# Patient Record
Sex: MALE | Race: OTHER | Employment: OTHER | URBAN - METROPOLITAN AREA
[De-identification: names, ages, dates, MRNs, and addresses within clinical notes are randomized per-mention and may not be internally consistent; named-entity substitution may affect disease eponyms.]

---

## 2023-01-12 ENCOUNTER — HOSPITAL ENCOUNTER (INPATIENT)
Age: 73
LOS: 9 days | Discharge: HOME HEALTH | DRG: 057 | End: 2023-01-21
Attending: PHYSICAL MEDICINE & REHABILITATION | Admitting: PHYSICAL MEDICINE & REHABILITATION
Payer: COMMERCIAL

## 2023-01-12 PROBLEM — I63.531 ACUTE ISCHEMIC RIGHT PCA STROKE (HCC): Status: ACTIVE | Noted: 2023-01-12

## 2023-01-12 LAB
GLUCOSE BLD-MCNC: 200 MG/DL (ref 70–99)
PERFORMED ON: ABNORMAL

## 2023-01-12 PROCEDURE — 1280000000 HC REHAB R&B

## 2023-01-12 PROCEDURE — 94150 VITAL CAPACITY TEST: CPT

## 2023-01-12 PROCEDURE — 6360000002 HC RX W HCPCS: Performed by: PHYSICAL MEDICINE & REHABILITATION

## 2023-01-12 PROCEDURE — 6370000000 HC RX 637 (ALT 250 FOR IP): Performed by: PHYSICAL MEDICINE & REHABILITATION

## 2023-01-12 RX ORDER — LANOLIN ALCOHOL/MO/W.PET/CERES
3 CREAM (GRAM) TOPICAL NIGHTLY PRN
Status: DISCONTINUED | OUTPATIENT
Start: 2023-01-12 | End: 2023-01-21 | Stop reason: HOSPADM

## 2023-01-12 RX ORDER — HEPARIN SODIUM 5000 [USP'U]/ML
5000 INJECTION, SOLUTION INTRAVENOUS; SUBCUTANEOUS EVERY 12 HOURS
Status: DISCONTINUED | OUTPATIENT
Start: 2023-01-12 | End: 2023-01-21 | Stop reason: HOSPADM

## 2023-01-12 RX ORDER — INSULIN LISPRO 100 [IU]/ML
0-4 INJECTION, SOLUTION INTRAVENOUS; SUBCUTANEOUS
Status: DISCONTINUED | OUTPATIENT
Start: 2023-01-12 | End: 2023-01-21 | Stop reason: HOSPADM

## 2023-01-12 RX ORDER — ASPIRIN 81 MG/1
81 TABLET ORAL DAILY
Status: DISCONTINUED | OUTPATIENT
Start: 2023-01-13 | End: 2023-01-21 | Stop reason: HOSPADM

## 2023-01-12 RX ORDER — QUETIAPINE FUMARATE 25 MG/1
50 TABLET, FILM COATED ORAL NIGHTLY PRN
Status: DISCONTINUED | OUTPATIENT
Start: 2023-01-12 | End: 2023-01-21 | Stop reason: HOSPADM

## 2023-01-12 RX ORDER — SENNA AND DOCUSATE SODIUM 50; 8.6 MG/1; MG/1
1 TABLET, FILM COATED ORAL 2 TIMES DAILY
Status: DISCONTINUED | OUTPATIENT
Start: 2023-01-12 | End: 2023-01-21 | Stop reason: HOSPADM

## 2023-01-12 RX ORDER — HYDRALAZINE HYDROCHLORIDE 10 MG/1
10 TABLET, FILM COATED ORAL EVERY 6 HOURS PRN
Status: DISCONTINUED | OUTPATIENT
Start: 2023-01-12 | End: 2023-01-21 | Stop reason: HOSPADM

## 2023-01-12 RX ORDER — DEXTROSE MONOHYDRATE 100 MG/ML
INJECTION, SOLUTION INTRAVENOUS CONTINUOUS PRN
Status: DISCONTINUED | OUTPATIENT
Start: 2023-01-12 | End: 2023-01-21 | Stop reason: HOSPADM

## 2023-01-12 RX ORDER — ERGOCALCIFEROL 1.25 MG/1
50000 CAPSULE ORAL WEEKLY
Status: DISCONTINUED | OUTPATIENT
Start: 2023-01-13 | End: 2023-01-21 | Stop reason: HOSPADM

## 2023-01-12 RX ORDER — ATORVASTATIN CALCIUM 80 MG/1
80 TABLET, FILM COATED ORAL NIGHTLY
Status: DISCONTINUED | OUTPATIENT
Start: 2023-01-12 | End: 2023-01-21 | Stop reason: HOSPADM

## 2023-01-12 RX ORDER — INSULIN LISPRO 100 [IU]/ML
0-4 INJECTION, SOLUTION INTRAVENOUS; SUBCUTANEOUS NIGHTLY
Status: DISCONTINUED | OUTPATIENT
Start: 2023-01-12 | End: 2023-01-21 | Stop reason: HOSPADM

## 2023-01-12 RX ORDER — ACETAMINOPHEN 325 MG/1
650 TABLET ORAL EVERY 6 HOURS PRN
Status: DISCONTINUED | OUTPATIENT
Start: 2023-01-12 | End: 2023-01-21 | Stop reason: HOSPADM

## 2023-01-12 RX ORDER — POLYETHYLENE GLYCOL 3350 17 G/17G
17 POWDER, FOR SOLUTION ORAL DAILY PRN
Status: DISCONTINUED | OUTPATIENT
Start: 2023-01-12 | End: 2023-01-21 | Stop reason: HOSPADM

## 2023-01-12 RX ORDER — ONDANSETRON 4 MG/1
4 TABLET, FILM COATED ORAL EVERY 8 HOURS PRN
Status: DISCONTINUED | OUTPATIENT
Start: 2023-01-12 | End: 2023-01-21 | Stop reason: HOSPADM

## 2023-01-12 RX ORDER — BISACODYL 10 MG
10 SUPPOSITORY, RECTAL RECTAL DAILY PRN
Status: DISCONTINUED | OUTPATIENT
Start: 2023-01-12 | End: 2023-01-21 | Stop reason: HOSPADM

## 2023-01-12 RX ADMIN — ACETAMINOPHEN 325MG 650 MG: 325 TABLET ORAL at 22:26

## 2023-01-12 RX ADMIN — ATORVASTATIN CALCIUM 80 MG: 80 TABLET, FILM COATED ORAL at 20:26

## 2023-01-12 RX ADMIN — SENNOSIDES AND DOCUSATE SODIUM 1 TABLET: 50; 8.6 TABLET ORAL at 20:26

## 2023-01-12 RX ADMIN — HEPARIN SODIUM 5000 UNITS: 5000 INJECTION INTRAVENOUS; SUBCUTANEOUS at 22:28

## 2023-01-12 RX ADMIN — QUETIAPINE FUMARATE 50 MG: 25 TABLET ORAL at 22:31

## 2023-01-12 ASSESSMENT — PAIN SCALES - WONG BAKER: WONGBAKER_NUMERICALRESPONSE: 0

## 2023-01-12 ASSESSMENT — PAIN - FUNCTIONAL ASSESSMENT: PAIN_FUNCTIONAL_ASSESSMENT: ACTIVITIES ARE NOT PREVENTED

## 2023-01-12 ASSESSMENT — PAIN DESCRIPTION - LOCATION: LOCATION: HEAD

## 2023-01-12 ASSESSMENT — PAIN SCALES - GENERAL: PAINLEVEL_OUTOF10: 7

## 2023-01-12 NOTE — H&P
Department of Physical Medicine & Rehabilitation  History & Physical      Patient Identification:  Gabriela Buenrostro  : 1950  Admit date: 2023   Attending provider: Jossy Coffey MD        Primary care provider: No primary care provider on file. Chief Complaint: left vision impairment, memory difficulty    History of Present Illness/Hospital Course:  Patient is a 80 yo M with pmh DM2 who initially presented to the [facility] on 2023 with headache, blurred vision, and difficulty ambulating. CTA head/neck revealed right PCA P2 segment occlusion. MRI brain showed acute ischemic infarcts in the right PCA distribution (right temporal and occipital lobes) with hemorrhagic transformation. There is concern for cardioembolic etiology. HUGO with PFO. Neurology recommending ASA, statin, and cardiac event monitor. Course complicated by hyperglycemia, SHELLIE, and incidental finding of meningioma. Now presents to ARU with impaired mobility, self-care, and cognition below his baseline. Currently, patient reports abnormal left peripheral vision. Also noting difficulty with memory. He has had intermittent headaches localized to right posterior head with radiation forward. Overall headaches gradually improving. He denies focal weakness, tingling/numbness, difficulty with speech or swallow. He is motivated to start inpatient rehab program.     Prior Level of Function:  Independent for mobility, ADLs, and IADLs  Working full time as  at school    Current Level of Function:  Min-max assist for mobility and ADLs    Pertinent Social History:  Support: lives in group home with 4 roommates  Home set-up: multi level with 6 steps to enter    Past Medical History:   Diagnosis Date    A-fib ([facility] [address]     CHF (congestive heart failure) ([facility] [address]     DM2 (diabetes mellitus, type 2) ([facility] [address]     Meningioma ([facility] [address]        No past surgical history on file. No family history on file.     Social History     Socioeconomic History Marital status: Single     Spouse name: None    Number of children: None    Years of education: None    Highest education level: None   Tobacco Use    Smoking status: Former     Types: Cigarettes     Passive exposure: Past    Smokeless tobacco: Never   Vaping Use    Vaping Use: Never used   Substance and Sexual Activity    Alcohol use: Not Currently     Comment: \"I used to. But not anymore. \"    Drug use: Not Currently     Comment: \"I used to. \"    Sexual activity: Not Currently       No Known Allergies      Current Facility-Administered Medications   Medication Dose Route Frequency Provider Last Rate Last Admin    aspirin EC tablet 81 mg  81 mg Oral Daily Messi Alegria MD   81 mg at 01/13/23 0739    atorvastatin (LIPITOR) tablet 80 mg  80 mg Oral Nightly Messi Alegria MD   80 mg at 01/12/23 2026    heparin (porcine) injection 5,000 Units  5,000 Units SubCUTAneous Q12H Messi Alegria MD   5,000 Units at 01/12/23 2228    vitamin D (ERGOCALCIFEROL) capsule 50,000 Units  50,000 Units Oral Weekly Messi Alegria MD   50,000 Units at 01/13/23 0739    QUEtiapine (SEROQUEL) tablet 50 mg  50 mg Oral Nightly PRN Messi Alegria MD   50 mg at 01/12/23 2231    melatonin tablet 3 mg  3 mg Oral Nightly PRN Messi Alegria MD        [facility]ansetron [facility]) tablet 4 mg  4 mg Oral Q8H PRN Messi Alegria MD        insulin lispro (HUMALOG) injection vial 0-4 Units  0-4 Units SubCUTAneous TID [facility] Messi Alegria MD   1 Units at 01/13/23 0740    insulin lispro (HUMALOG) injection vial 0-4 Units  0-4 Units SubCUTAneous Nightly Messi Alegria MD        metFORMIN (GLUCOPHAGE) tablet 1,000 mg  1,000 mg Oral Daily with breakfast Messi Alegria MD   1,000 mg at 01/13/23 0740    hydrALAZINE (APRESOLINE) tablet 10 mg  10 mg Oral Q6H PRN Messi Alegria MD        bisacodyl (DULCOLAX) suppository 10 mg  10 mg Rectal Daily PRN Messi Alegria MD        acetaminophen (TYLENOL) tablet 650 mg  650 mg Oral Q6H PRN Preeti Parrish MD   650 mg at 01/13/23 0739    polyethylene glycol (GLYCOLAX) packet 17 g  17 g Oral Daily PRN Preeti Parrish MD   17 g at 01/13/23 6754    magnesium hydroxide (MILK OF MAGNESIA) 400 MG/5ML suspension 30 mL  30 mL Oral Daily PRN Preeti Parrish MD        sennosides-docusate sodium (SENOKOT-S) 8.6-50 MG tablet 1 tablet  1 tablet Oral BID Preeti Parrish MD   1 tablet at 01/13/23 0741    glucose chewable tablet 16 g  4 tablet Oral PRN Preeti Parrish MD        dextrose bolus 10% 125 mL  125 mL IntraVENous PRN Preeti Parrish MD        Or    dextrose bolus 10% 250 mL  250 mL IntraVENous PRN Preeti Parrish MD        glucagon (rDNA) injection 1 mg  1 mg SubCUTAneous PRPAULO Parrish MD        dextrose 10 % infusion   IntraVENous Continuous JENNIFER Parrish MD             REVIEW OF SYSTEMS:   CONSTITUTIONAL: negative for fevers, chills, diaphoresis, appetite change, night sweats, unexpected weight change, +fatigue. EYES: negative for blurred vision, eye discharge, icterus. +visual disturbance  HEENT: negative for hearing loss, tinnitus, ear drainage, sinus pressure, nasal congestion, epistaxis and snoring. RESPIRATORY: Negative for hemoptysis, cough, sputum production. CARDIOVASCULAR: negative for chest pain, palpitations, exertional chest pressure/discomfort, syncope, edema   GASTROINTESTINAL: negative for nausea, vomiting, diarrhea, blood in stool, abdominal pain, constipation. GENITOURINARY: negative for frequency, dysuria, urinary incontinence, decreased urine volume, and hematuria. HEMATOLOGIC/LYMPHATIC: negative for easy bruising, bleeding and lymphadenopathy. ALLERGIC/IMMUNOLOGIC: negative for recurrent infections, angioedema, anaphylaxis and drug reactions. ENDOCRINE: negative for weight changes and diabetic symptoms including polyuria, polydipsia and polyphagia.    MUSCULOSKELETAL: negative for pain, joint swelling, decreased range of motion. NEUROLOGICAL: refer to HPI  PSYCHIATRIC/BEHAVIORAL: negative for hallucinations, behavioral problems, agitation, confusion. All pertinent positives are noted in the HPI. Physical Examination:  Vitals: Patient Vitals for the past 24 hrs:   BP Temp Temp src Pulse Resp SpO2 Height Weight   01/13/23 0841 -- -- -- -- -- 97 % -- --   01/13/23 0735 120/71 98 °F (36.7 °C) Oral 55 18 99 % -- --   01/13/23 0539 101/61 98.2 °F (36.8 °C) Oral (!) 47 16 96 % -- 167 lb 15.9 oz (76.2 kg)   01/12/23 1845 122/76 97.9 °F (36.6 °C) Oral 57 16 95 % 6' 8\" (2.032 m) 167 lb 15.9 oz (76.2 kg)       Const: Alert. WDWN. No distress  Eyes: Conjunctiva noninjected, no icterus noted; pupils equal, round, and reactive to light. HENT: Atraumatic, normocephalic; Oral mucosa moist  Neck: Trachea midline, neck supple. No thyromegaly noted. CV: Regular rate and rhythm, no murmur rub or gallop noted  Resp: Lungs clear to auscultation bilaterally, no rales wheezes or rhonchi, no retractions. Respirations unlabored. GI: Soft, nontender, nondistended. Normal bowel sounds. No palpable masses. Skin: Normal temperature and turgor. No rashes or breakdown noted on exposed areas. Ext: No significant edema appreciated. No varicosities. MSK: No joint tenderness, erythema, warmth noted. AROM intact. Neuro:   -Mental status: Alert. Oriented to person, place, time, situation. Impaired recall, mildly delayed processing.   -Language: Speech fluent, repetition and naming intact  -Cranial nerves: VFF, PERRL, EOMI, Facial sensation intact, Face symmetric, Hearing intact, Palate elevation symmetric, Shoulder shrug intact. Tongue midline.   -Sensation intact to light touch. -Motor examination reveals strength 5/5 RUE/RLE, subtle weakness 5-/5 LUE/LLE compared to right.   -No abnormalities with finger/nose noted. -Reflexes diminished, symmetric.  Negative Shaunna  Psych: Stable mood, normal judgement, normal affect     Lab Results Component Value Date    WBC 7.3 01/13/2023    HGB 12.8 (L) 01/13/2023    HCT 39.8 (L) 01/13/2023    MCV 88.7 01/13/2023     01/13/2023     No results found for: INR, PROTIME  Lab Results   Component Value Date    CREATININE 1.3 01/13/2023    BUN 14 01/13/2023     01/13/2023    K 4.2 01/13/2023     01/13/2023    CO2 23 01/13/2023     No results found for: ALT, AST, GGT, ALKPHOS, BILITOT    Available laboratory, medical testing, and imaging data from [facility] has been reviewed. Sodium 01/12/2023  140  Potassium 01/12/2023  3.9  Chloride 01/12/2023  103  BUN 01/12/2023  14  Creatinine 01/12/2023  1.44  WBC 01/12/2023  7.06  Hemoglobin 01/12/2023  12.3  Hematocrit 01/12/2023  37.5  Platelet Count 83/21/0550  256    HUGO 1/9/23 Study Conclusions  - Left ventricle: The cavity size is normal. Systolic function was  normal. The calculated ejection fraction was in the range of 52%  to 58%. - Mitral valve: There is no evidence of a vegetation.  - Left atrium: There is no evidence of a thrombus in the atrial  cavity or appendage.  - Right atrium: There is no evidence of a thrombus in the atrial  cavity or appendage.  - Atrial septum: There was a patent foramen ovale. Agitated saline  contrast study shows a moderate bidirectional, but predominantly  left-to-right, atrial level shunt. CTA Neck   1. Arterial stenosis     CTA Head:   1. Occluded right posterior cerebral artery involving the distal P2 segment   2. Acute infarction involving the right occipitotemporal lobe involving right PCA territory    MRI brain   1. Large acute infarction in the right PCA territory involving right occipital lobe, inferior medial right temporal lobe and left thalamus with evidence of hemorrhagic transformation. 2. Probable 1.4 x 0.6 cm right frontal convexity meningioma. Body mass index is 18.45 kg/m².     POST ADMISSION PHYSICIAN EVALUATION  The patient has agreed to being admitted to our comprehensive inpatient rehabilitation facility and can tolerate the intensity of service consisting of at least:  --180 minutes of therapy a day, 5 out of 7 days a week. OR  --15 hours of intensive therapy within a 7 consecutive day period. The patient/family has a good understanding of our discharge process and will benefit from an interdisciplinary inpatient rehabilitation program. The patient has potential to make improvement and is in need of at least two of the following multidisciplinary therapies including but not limited to physical, occupational, respiratory, and speech, nutritional services, wound care, and prosthetics and orthotics. Given the patients complex condition and risk of further medical complications, rehabilitation services cannot be safely provided at a lower level of care such as a skilled nursing facility. All of the goals listed below were reviewed with the patient and he/she is in agreement. I have compared the patients medical and functional status at the time of the preadmission screening and the same on this date, and there are no significant changes. By signing this document, I acknowledge that I have personally performed a full physical examination on this patient within 24 hours of admission to this inpatient rehabilitation facility and have determined the patient to be able to tolerate the above course of treatment at an intensive level for a reasonable period of time. I will be completing a detailed individualized  Plan of Care for this patient by day four of the patients stay based upon the Preadmission Screen, this Post-Admission Evaluation, and the therapy evaluations.      Barriers: left hemiparesis, left VF deficit, decreased balance, cognition, medical comorbidities  Services Required: PT, OT, SLP  Goals: mod for mobility and ADLs  Prognosis: Good  Anticipated Dispo: home to group home  ELOS: 7-10 days    Rehabilitation Diagnosis:   Stroke, 1.1, Left Body (R Brain)      Assessment and Plan:    Impairments:left hemiparesis, left VF deficit, decreased balance, cognition    Acute ischemic right PCA stroke with hemorrhagic transformation  -Localization: P2 occlusion affecting R temporal and occipital lobes, thalamus  -Etiology: concern for embolic source  -Secondary ppx: ASA, statin  -Telemetry while inpatient, then cardiac event monitor  -PT/OT/SLP    DM2 with hyperglycemia  -Hgb A1C 10  -metformin, ISS    dCHF  -Daily wt    Sinus bradycardia  -Baseline per patient  -Telemetry    SHELLIE vs CKD  -Cr 1.3-1.5 at [facility]  -Avoid nephrotoxins, renally dose meds  -Monitor renal function    Right frontal convexity Meningioma  -Incidental finding  -Plan for f/u with Dr. Jose Epps as outpatient    Bladder   -High risk retention   -Monitor PVRs, SC prn >300cc    Bowel   -High risk constipation   -senna+colace BID, PRN miralax, MoM, and bisacodyl supp. Safety   -fall precautions    Pain control  -acetaminophen prn    DVT ppx  -heparin    FULL CODE      Tracy Gil MD 1/13/2023, 10:26 AM

## 2023-01-13 PROBLEM — E44.0 MODERATE MALNUTRITION (HCC): Status: ACTIVE | Noted: 2023-01-13

## 2023-01-13 LAB
ANION GAP SERPL CALCULATED.3IONS-SCNC: 13 MMOL/L (ref 3–16)
BASOPHILS ABSOLUTE: 0 K/UL (ref 0–0.2)
BASOPHILS RELATIVE PERCENT: 0.4 %
BUN BLDV-MCNC: 14 MG/DL (ref 7–20)
CALCIUM SERPL-MCNC: 9.1 MG/DL (ref 8.3–10.6)
CHLORIDE BLD-SCNC: 100 MMOL/L (ref 99–110)
CO2: 23 MMOL/L (ref 21–32)
CREAT SERPL-MCNC: 1.3 MG/DL (ref 0.8–1.3)
EOSINOPHILS ABSOLUTE: 0.2 K/UL (ref 0–0.6)
EOSINOPHILS RELATIVE PERCENT: 2.5 %
GFR SERPL CREATININE-BSD FRML MDRD: 59 ML/MIN/{1.73_M2}
GLUCOSE BLD-MCNC: 182 MG/DL (ref 70–99)
GLUCOSE BLD-MCNC: 183 MG/DL (ref 70–99)
GLUCOSE BLD-MCNC: 222 MG/DL (ref 70–99)
GLUCOSE BLD-MCNC: 238 MG/DL (ref 70–99)
GLUCOSE BLD-MCNC: 263 MG/DL (ref 70–99)
HCT VFR BLD CALC: 39.8 % (ref 40.5–52.5)
HEMOGLOBIN: 12.8 G/DL (ref 13.5–17.5)
LYMPHOCYTES ABSOLUTE: 2.1 K/UL (ref 1–5.1)
LYMPHOCYTES RELATIVE PERCENT: 28.5 %
MCH RBC QN AUTO: 28.6 PG (ref 26–34)
MCHC RBC AUTO-ENTMCNC: 32.3 G/DL (ref 31–36)
MCV RBC AUTO: 88.7 FL (ref 80–100)
MONOCYTES ABSOLUTE: 0.6 K/UL (ref 0–1.3)
MONOCYTES RELATIVE PERCENT: 7.5 %
NEUTROPHILS ABSOLUTE: 4.5 K/UL (ref 1.7–7.7)
NEUTROPHILS RELATIVE PERCENT: 61.1 %
PDW BLD-RTO: 14 % (ref 12.4–15.4)
PERFORMED ON: ABNORMAL
PLATELET # BLD: 265 K/UL (ref 135–450)
PMV BLD AUTO: 7.4 FL (ref 5–10.5)
POTASSIUM REFLEX MAGNESIUM: 4.2 MMOL/L (ref 3.5–5.1)
PREALBUMIN: 25.1 MG/DL (ref 20–40)
RBC # BLD: 4.48 M/UL (ref 4.2–5.9)
SODIUM BLD-SCNC: 136 MMOL/L (ref 136–145)
WBC # BLD: 7.3 K/UL (ref 4–11)

## 2023-01-13 PROCEDURE — 1280000000 HC REHAB R&B

## 2023-01-13 PROCEDURE — 36415 COLL VENOUS BLD VENIPUNCTURE: CPT

## 2023-01-13 PROCEDURE — 97112 NEUROMUSCULAR REEDUCATION: CPT | Performed by: PHYSICAL THERAPIST

## 2023-01-13 PROCEDURE — 6370000000 HC RX 637 (ALT 250 FOR IP): Performed by: PHYSICAL MEDICINE & REHABILITATION

## 2023-01-13 PROCEDURE — 97116 GAIT TRAINING THERAPY: CPT | Performed by: PHYSICAL THERAPIST

## 2023-01-13 PROCEDURE — 6360000002 HC RX W HCPCS: Performed by: PHYSICAL MEDICINE & REHABILITATION

## 2023-01-13 PROCEDURE — 92523 SPEECH SOUND LANG COMPREHEN: CPT

## 2023-01-13 PROCEDURE — 97530 THERAPEUTIC ACTIVITIES: CPT

## 2023-01-13 PROCEDURE — 94760 N-INVAS EAR/PLS OXIMETRY 1: CPT

## 2023-01-13 PROCEDURE — 84134 ASSAY OF PREALBUMIN: CPT

## 2023-01-13 PROCEDURE — 97162 PT EVAL MOD COMPLEX 30 MIN: CPT | Performed by: PHYSICAL THERAPIST

## 2023-01-13 PROCEDURE — 97530 THERAPEUTIC ACTIVITIES: CPT | Performed by: PHYSICAL THERAPIST

## 2023-01-13 PROCEDURE — 97535 SELF CARE MNGMENT TRAINING: CPT

## 2023-01-13 PROCEDURE — 97166 OT EVAL MOD COMPLEX 45 MIN: CPT

## 2023-01-13 PROCEDURE — 80048 BASIC METABOLIC PNL TOTAL CA: CPT

## 2023-01-13 PROCEDURE — 85025 COMPLETE CBC W/AUTO DIFF WBC: CPT

## 2023-01-13 RX ORDER — ASPIRIN 81 MG/1
81 TABLET ORAL DAILY
Status: ON HOLD | COMMUNITY
Start: 2023-01-11 | End: 2023-01-20 | Stop reason: SDUPTHER

## 2023-01-13 RX ORDER — ATORVASTATIN CALCIUM 80 MG/1
80 TABLET, FILM COATED ORAL NIGHTLY
Status: ON HOLD | COMMUNITY
Start: 2023-01-10 | End: 2023-01-20 | Stop reason: SDUPTHER

## 2023-01-13 RX ORDER — TERBINAFINE HYDROCHLORIDE 250 MG/1
250 TABLET ORAL 2 TIMES DAILY
COMMUNITY

## 2023-01-13 RX ORDER — QUETIAPINE FUMARATE 50 MG/1
50 TABLET, FILM COATED ORAL PRN
Status: ON HOLD | COMMUNITY
Start: 2022-12-13 | End: 2023-01-20 | Stop reason: SDUPTHER

## 2023-01-13 RX ORDER — ERGOCALCIFEROL 1.25 MG/1
50000 CAPSULE ORAL WEEKLY
COMMUNITY

## 2023-01-13 RX ADMIN — ATORVASTATIN CALCIUM 80 MG: 80 TABLET, FILM COATED ORAL at 19:56

## 2023-01-13 RX ADMIN — ASPIRIN 81 MG: 81 TABLET, COATED ORAL at 07:39

## 2023-01-13 RX ADMIN — HEPARIN SODIUM 5000 UNITS: 5000 INJECTION INTRAVENOUS; SUBCUTANEOUS at 22:30

## 2023-01-13 RX ADMIN — POLYETHYLENE GLYCOL 3350 17 G: 17 POWDER, FOR SOLUTION ORAL at 06:08

## 2023-01-13 RX ADMIN — ACETAMINOPHEN 325MG 650 MG: 325 TABLET ORAL at 07:39

## 2023-01-13 RX ADMIN — ERGOCALCIFEROL 50000 UNITS: 1.25 CAPSULE ORAL at 07:39

## 2023-01-13 RX ADMIN — METFORMIN HYDROCHLORIDE 1000 MG: 500 TABLET ORAL at 07:40

## 2023-01-13 RX ADMIN — INSULIN LISPRO 1 UNITS: 100 INJECTION, SOLUTION INTRAVENOUS; SUBCUTANEOUS at 07:40

## 2023-01-13 RX ADMIN — QUETIAPINE FUMARATE 50 MG: 25 TABLET ORAL at 22:28

## 2023-01-13 RX ADMIN — HEPARIN SODIUM 5000 UNITS: 5000 INJECTION INTRAVENOUS; SUBCUTANEOUS at 11:30

## 2023-01-13 RX ADMIN — SENNOSIDES AND DOCUSATE SODIUM 1 TABLET: 50; 8.6 TABLET ORAL at 07:41

## 2023-01-13 ASSESSMENT — 9 HOLE PEG TEST
TEST_RESULT: IMPAIRED
TESTTIME_SECONDS: 30
TEST_RESULT: IMPAIRED
TESTTIME_SECONDS: 37

## 2023-01-13 ASSESSMENT — PAIN - FUNCTIONAL ASSESSMENT: PAIN_FUNCTIONAL_ASSESSMENT: ACTIVITIES ARE NOT PREVENTED

## 2023-01-13 ASSESSMENT — PAIN DESCRIPTION - ORIENTATION: ORIENTATION: LEFT;RIGHT

## 2023-01-13 ASSESSMENT — PAIN SCALES - GENERAL: PAINLEVEL_OUTOF10: 3

## 2023-01-13 ASSESSMENT — PAIN DESCRIPTION - LOCATION: LOCATION: HEAD

## 2023-01-13 ASSESSMENT — PAIN DESCRIPTION - DESCRIPTORS: DESCRIPTORS: ACHING

## 2023-01-13 NOTE — PLAN OF CARE
Problem: Discharge Planning  Goal: Discharge to home or other facility with appropriate resources  1/13/2023 0926 by Jazmine Harris RN  Outcome: Progressing    Problem: Safety - Adult  Goal: Free from fall injury  1/13/2023 0926 by Jazmine Harris RN  Outcome: Progressing    Problem: Pain  Goal: Verbalizes/displays adequate comfort level or baseline comfort level  1/13/2023 0926 by Jazmine Harris RN  Outcome: Progressing    Problem: Respiratory - Adult  Goal: Achieves optimal ventilation and oxygenation  Outcome: Progressing     Problem: Cardiovascular - Adult  Goal: Maintains optimal cardiac output and hemodynamic stability  Recent Flowsheet Documentation    Problem: Skin/Tissue Integrity - Adult  Goal: Skin integrity remains intact  Outcome: Progressing

## 2023-01-13 NOTE — PROGRESS NOTES
Physical Therapy  Facility/Department: [facility]HAB  Rehabilitation Physical Therapy Initial Assessment    NAME: Chaitanya Costa  : 1950 (81 y.o.)  MRN: [id number]  CODE STATUS: Full Code    Date of Service: 23      Past Medical History:   Diagnosis Date    A-fib [facility])     CHF (congestive heart failure) ([facility]     DM2 (diabetes mellitus, type 2) ([facility]     Meningioma ([facility]      History reviewed. No pertinent surgical history. Chart Reviewed: Yes  Patient assessed for rehabilitation services?: Yes  Additional Pertinent Hx: Patient is a 80 yo M with pmh DM2 who initially presented to the [facility] on 2023 with headache, blurred vision, and difficulty ambulating. CTA head/neck revealed right PCA P2 segment occlusion. MRI brain showed acute ischemic infarcts in the right PCA distribution (right temporal and occipital lobes) with hemorrhagic transformation, incidental finding of meningioma. Family / Caregiver Present: No  Referring Practitioner: Yonas Lee MD  Referral Date : 23  Diagnosis: R PCA stroke    Restrictions:  Restrictions/Precautions: Fall Risk  Position Activity Restriction  Other position/activity restrictions: reg diet     SUBJECTIVE  Subjective: Pt reports some dizziness during session.     Prior Level of Function:  Social/Functional History  Lives With: Friend(s) (shares apt w/ 3 other men)  Type of Home: [facility], bedrm/bathrm on 2nd fl)  Home Access: Stairs to enter with rails  Entrance Stairs - Number of Steps: 4 steps to enter building, has 10 steps to 2nd fl where bathrm is located, another 10 steps to bedrm on 3rd fl  Entrance Stairs - Rails: Both  Bathroom Shower/Tub: Walk-in shower  Bathroom Toilet: Standard  Bathroom Accessibility: Not accessible  Has the patient had two or more falls in the past year or any fall with injury in the past year?: No  ADL Assistance: [address] [place] [address] Avenue: [address] [place] [address] Responsibilities: Yes  Meal Prep Responsibility: Primary  Laundry Responsibility: Primary  Cleaning Responsibility: Primary  Shopping Responsibility: Primary  Ambulation Assistance: Independent  Transfer Assistance: Independent  Active : No  Mode of Transportation: Friends, Bus  Occupation: Full time employment  Type of Occupation:  at [employer], his boss will pick him up otherwise used [address]: watches movies  Additional Comments: sleeps in regular bed, has 1 brother & 2 sisters in town but 2 of the siblings have dementia    OBJECTIVE  Vision  Vision: Impaired  Vision Exceptions: Wears glasses for reading (glasses not here)  Tracking: Able to track stimulus in all quads w/o difficulty  Convergence: Breaks at 7 from nose    Hearing  Hearing: Within functional limits    Cognition  Overall Cognitive Status: Exceptions  Memory: Decreased short term memory  Insights: Decreased awareness of deficits  Cognition Comment: pt is cooperative, mild word finding & mild slow processing    ROM   WFL BLEs    Strength  Strength RLE  Strength RLE: WFL  Strength LLE  Strength LLE: WFL    Quality of Movement  Tone RLE  RLE Tone: Normotonic  Tone LLE  LLE Tone: Normotonic  Motor Control  Gross Motor?: WNL  Coordination  Rapid Alternating Movements: Normal  Finger to Nose: Normal  Heel to Shin: Normal    Sensation  Overall Sensation Status: WNL    Functional Mobility  Bed mobility  Bridging: Supervision  Rolling to Left: Supervision  Rolling to Right: Supervision  Supine to Sit: Supervision  Sit to Supine: Supervision  Scooting: Supervision  Bed Mobility Comments: Flat bed  Transfers  Sit to Stand: Contact guard assistance  Stand to Sit: Contact guard assistance  Bed to Chair: Contact guard assistance  Stand Pivot Transfers: Contact guard assistance  Car Transfer: Contact guard assistance  Comment: Cues for hand placement and to turn all the way around to sit.     Environmental Mobility  Ambulation  Surface: Level tile; Carpet  Device: Sheridan Patricio; No Device  Assistance: Contact guard assistance  Quality of Gait: R LE turned out L straight  Gait Deviations: Decreased head and trunk rotation;Staggers  Distance: 50', 150', 200'. 200' x2 w/o AD  Stairs/Curb  Stairs?: Yes  Stairs  # Steps : 4  Stairs Height: 6\"  Rails: Bilateral  Curbs: 6\"  Device: No Device  Assistance: Contact guard assistance  Comment: Reciprocal up, 1 at a time initially then reciprocally    PM session:  Balance  Posture: Good  Sitting - Static: Good  Sitting - Dynamic: Good  Standing - Static: Good;-  Standing - Dynamic: Fair;+  Comments: Tinetti Balance Score = 23/28 Interpretation: Scores between 19 and 23 are consistent with moderate fall risk. Standing balance on AirEx foam with feet hips width apart then in sharp Rhomberg with eyes open then closed. He had more difficulty with eyes closed. PT Exercises  Standing Open/Closed Kinetic Chain Exercises: Mini Squats, marches, heel/toe raises, ABD/ADD, hip ext and knee flexion x15 reps with cues for form  Ambulation  Surface: Level tile; Carpet  Device: No Device  Assistance: Contact guard assistance  Quality of Gait: R LE turned out L straight  Gait Deviations: Decreased head and trunk rotation;Staggers  Distance: 200' x2 w/o AD  ASSESSMENT     Activity Tolerance  Activity Tolerance: Patient tolerated evaluation without incident    Assessment  Assessment: Patient is a 82 yo M with pmh DM2 who initially presented to the [facility] on 1/5/2023 with headache, blurred vision, and difficulty ambulating. CTA head/neck revealed right PCA P2 segment occlusion. MRI brain showed acute ischemic infarcts in the right PCA distribution (right temporal and occipital lobes) with hemorrhagic transformation, incidental finding of meningioma. The pt lives at [address] [place] [address],[address] Floor with 3 roommates in 2nd floor Apt with 6 MONICA with rails.  He works full time as a  at [employer] and he doesn't drive so his boss picks him up. He otherwise uses the [employer] for transportation. He has good strength and ROM but his balance is impaired, he scored 23/28 on the Tinetti gait and balance score. He needs CGA for safety with transfers, bed mobility, gait and stairs. He did report some dizziness during sessions today. Pt would benefit from skilled PT on our ARU to maximize functional mobility, gait and independence so he can return home and go back to work. Treatment Diagnosis: Decreased functional mobility  Therapy Prognosis: Good  Decision Making: Medium Complexity  History: Patient is a 82 yo M with pmh DM2 who initially presented to the [facility] on 1/5/2023 with headache, blurred vision, and difficulty ambulating. CTA head/neck revealed right PCA P2 segment occlusion. MRI brain showed acute ischemic infarcts in the right PCA distribution (right temporal and occipital lobes) with hemorrhagic transformation, incidental finding of meningioma. The pt lives at [address] [place] [address],[address] with 3 roommates in 2nd floor Apt with 6 MONICA with rails. He works full time as a  at [employer] and he doesn't drive so his boss picks him up. He otherwise uses the [employer] for transportation. Exam: He has good strength and ROM but his balance is impaired, he scored 23/28 on the Tinetti gait and balance score. He needs CGA for safety with transfers, bed mobility, gait and stairs. He did report some dizziness during sessions today. Pt would benefit from skilled PT on our ARU to maximize functional mobility, gait and independence so he can return home and go back to work.   Clinical Presentation: Evolving  Barriers to Learning: L neglect, visual field cut beyond 30*  Treatment Initiated : 1/13/23  Discharge Recommendations: Continue to assess pending progress  PT D/C Equipment  Other: Will continue to assess  PT Equipment Recommendations  Other: Will continue to assess    CLINICAL IMPRESSION   Patient is a 81 yo M with pmh DM2 who initially presented to the [facility] on 1/5/2023 with headache, blurred vision, and difficulty ambulating. CTA head/neck revealed right PCA P2 segment occlusion. MRI brain showed acute ischemic infarcts in the right PCA distribution (right temporal and occipital lobes) with hemorrhagic transformation, incidental finding of meningioma. The pt lives at [address] [place] [address],[address] Floor with 3 roommates in 2nd floor Apt with 6 MONICA with rails. He works full time as a  at [employer] and he doesn't drive so his boss picks him up. He otherwise uses the [employer] for transportation. He has good strength and ROM but his balance is impaired, he scored 23/28 on the Tinetti gait and balance score. He needs CGA for safety with transfers, bed mobility, gait and stairs. He did report some dizziness during sessions today. Pt would benefit from skilled PT on our ARU to maximize functional mobility, gait and independence so he can return home and go back to work. GOALS  Patient Goals   Patient Goals : I want to go back to work  Short Term Goals  Time Frame for Short Term Goals: 1 week  Short Term Goal 1: I bed mobility  Short Term Goal 2: Mod I for all functional transfers  Short Term Goal 3: Amb 200' with Mod I and LRAD  Short Term Goal 4: Up/down curb with mod I  Short Term Goal 5: Up/down 12 steps with mod I    PLAN OF CARE  Frequency: 1-2 treatment sessions per day, 5-7 days per week  Physcial Therapy Plan  General Plan:  minutes of therapy at least 5 out of 7 days a week  Days Per Week: 5 Days  Hours Per Day: 1.5 hours  Therapy Duration: 4-7 Days  Current Treatment Recommendations: Strengthening;Balance training;Functional mobility training;Transfer training; Endurance training;Gait training;Stair training;Neuromuscular re-education; Return to work related activity;Home exercise program;Safety education & training;Patient/Caregiver education & training;Equipment evaluation, education, & procurement; Therapeutic activities  Safety Devices  Type of Devices: All fall risk precautions in place;Gait belt;Patient at risk for falls; Left in chair (Pt back to room with transport)  Restraints  Restraints Initially in Place: No    EDUCATION  Education  Education Given To: Patient  Education Provided: Role of Therapy;Plan of Care;Transfer Training;Mobility Training;Equipment; Safety  Education Method: Verbal;Demonstration  Barriers to Learning: None  Education Outcome: Verbalized understanding    ELOS: 5-7 days         Therapy Time   Individual Concurrent Group Co-treatment   Time In 0945         Time Out 1703         Minutes 45           Timed Code Treatment Minutes: 39 An Leonides Waggoner 54 Time     Individual Co-treatment   Time In 7773     Time Out 4101 Edilberto Caldera, PT, 01/13/23 at 5:10 PM

## 2023-01-13 NOTE — FLOWSHEET NOTE
Patient admitted to room  3273 last night 1/12/23 with Dx of R CVA   Arrived via stretcher with transporters. Patient was oriented to the Call Light, Phone, TV, Thermostat, Bed Controls, Bathroom and Emergency Cord. Patient verbalized and demonstrated understanding of all. Patient was also given an over view of Unit Routines for Acute Rehab, including what to wear for therapy. The patient's role in goal setting was reviewed along with an explanation of the Interdisciplinary Team meeting, the 's role in coordinating services and the Discharge Planning/Continuum of Care process. Patient Rights and Responsibilities were reviewed. Meal times were explained, including how to order food. The white board (used for communication) was pointed out emphasizing  the 3 hours/day Therapy Schedule (posted most evenings), the number (and process) for reporting grievances, and the Doctor's, Nurse's, and PCA's names. It was recommended that any family that will be care givers or any care givers the patient has, take part in therapy. There are no set visiting hours, and it was suggested that non-caregiver friends and family visitors come after therapy (at 4 PM or later) to allow patient to rest in between sessions.

## 2023-01-13 NOTE — PROGRESS NOTES
Occupational Therapy  Facility/Department: [facility]  Rehabilitation Occupational Therapy Evaluation       Date: 23  Patient Name: Douglas Parmar       Room: Y3C-9149/4391-21  MRN: [id number]  Account: [de-identified]   : 1950  (81 y.o.) Gender: male     Referring Practitioner: Dr Bill Finn  Diagnosis: CVA  Additional Pertinent Hx: pt is a 80 y/o male admitted from [facility] due to acute embolic stroke. Now presents w/ dense L homonymous hemianopia and weakness in L hemibody w/ mild ataxia L UE. MRI: large infarct in R PCA territory involving R occipital lobe, inferior medial R temporal lobe & L thalamus w/ evidence of hemorrhagic transformation. PMhx: DM, HTN, smoker, EF 53%    Restrictions  Restrictions/Precautions: Fall Risk  Other position/activity restrictions: reg diet  Equipment Used: Bed    Subjective             Vitals  Temp: 98 °F (36.7 °C)  Heart Rate: 55  Resp: 18  BP: 120/71  Weight: 167 lb 15.9 oz (76.2 kg)  BMI (Calculated): 18.5  Oxygen Therapy  SpO2: 97 %  Pulse Oximetry Type: Intermittent  Pulse Oximeter Device Mode: Intermittent  Pulse Oximeter Device Location: Finger  O2 Device: None (Room air)  Level of Consciousness: Alert (0)    Objective--completed during PM session  Vision - Basic Assessment  Prior Vision: Wears glasses only for reading (glasses are not here)  Visual Field Cut: Left  Oculo Motor Control:  WNL  Vision Comments: eye chart: L eye: 20/30, R eye: 20/50, vision disk: R--85*, L--30* (visual field loss L side)  Cognition  Overall Cognitive Status: Exceptions  Memory: Decreased short term memory  Insights: Decreased awareness of deficits  Cognition Comment: pt is cooperative, mild word finding & mild slow processing  Orientation  Overall Orientation Status: Within Normal Limits  Orientation Level: Oriented X4   Perception  Overall Perceptual Status: WFL (finger to nose test WNLs, will cont to address L visual field)  Sensation  Overall Sensation Status: WNL   ROM  LUE AROM (degrees)  LUE AROM : WNL  Left Hand AROM (degrees)  Left Hand AROM: WNL  RUE AROM (degrees)  RUE AROM : WNL  Right Hand AROM (degrees)  Right Hand AROM: WNL  LUE Strength  L Hand General: 4+/5  RUE Strength  R Hand General: 5/5  Fine Motor Skills  Coordination  Movements Are Fluid And Coordinated: No  Coordination and Movement Description: Fine motor impairments;Decreased speed;Decreased accuracy; Left UE   Comment: mild ataxia L UE, he is R hand dominant   Hand Assessment--completed during PM session  Hand Dominance  Hand Dominance: Right  LUE Edema - Circumference (cm)  LUE Edema Present?: No  Right Hand Strength -  (lbs)  Handle Setting 2: 101,100,101--average: 101lb average (45 lbs above average for age group)  RUE Edema - Circumference (cm)  RUE Edema Present?: No  Functional Mobility  Balance  Sitting Balance: Independent  Standing Balance: Contact guard assistance  Standing Balance  Time: 3-4 minutes  Activity: during t/f, shower  Comment: used GB for support, mild posterior L side sway but no LOB; overall close SB/CGA  Transfers  Sit to stand: Contact guard assistance  Stand to sit: Contact guard assistance  Transfer Comments: close SBA/CGA; cues not to pull up on RW but to push up from bed  Toilet Transfers  Equipment Used: Grab bars  Toilet Transfer: Contact guard assistance  Toilet Transfers Comments: close SB/CBA using GB, mild posterior lean & to L  Shower Transfers  Shower - Transfer From: Walker  Shower - Transfer Type: To  Shower - Transfer To:  Standing  Shower - Technique: Ambulating  Shower Transfers: Contact Guard  Shower Transfers Comments: close SB/CGA to use GBs to enter/exit shower stall, did not use shower chair; cues to keep 1 hand on GB AATs during shower  Wheelchair Bed Transfers  Equipment Used: Bed  Level of Asssistance: Contact guard assistance  Wheelchair Transfers Comments: SB/CGA for safe hand placement & use of RW (unsteady w/o AD due to posterior lean & to L)  Social/Functional History  Lives With: Friend(s) (shares apt w/ 3 other men)  Type of Home: Apartment [employer], bedrm/bathrm on 2nd fl)  Home Access: Stairs to enter with rails  Entrance Stairs - Number of Steps: 4 steps to enter building, has 10 steps to 2nd fl where bathrm is located, another 10 steps to bedrm on 3rd fl  Entrance Stairs - Rails: Both  Bathroom Shower/Tub: Walk-in shower  Bathroom Toilet: Standard  Bathroom Accessibility: Not accessible  Has the patient had two or more falls in the past year or any fall with injury in the past year?: No  ADL Assistance: [address]: Independent  Homemaking Responsibilities: Yes  Meal Prep Responsibility: Primary  Laundry Responsibility: Primary  Cleaning Responsibility: Primary  Shopping Responsibility: Primary  Ambulation Assistance: Independent  Transfer Assistance: Independent  Active : No  Mode of Transportation: Friends;Bus  Occupation: Full time employment  Type of Occupation:  at [employer], his boss will pick him up otherwise used [address]: watches movies  Additional Comments: sleeps in regular bed, has 1 brother & 2 sisters in town but 2 of the siblings have dementia  ADL  Feeding: Setup  Grooming: Stand by assistance  Grooming Skilled Clinical Factors: stood at sink to shave  UE Bathing: Stand by assistance;Setup;Verbal cueing  UE Bathing Skilled Clinical Factors: stood during shower, cues to keep 1 hand on GBs AATs  LE Bathing: Contact guard assistance  LE Bathing Skilled Clinical Factors: while in shower, he leans against wall, bumped head on L side as he tried to bend over to dry off feet; OT recommended he sit to dry off feet to reduce fall risk  UE Dressing: Setup  LE Dressing: Contact guard assistance  LE Dressing Skilled Clinical Factors: close SB/CGA during LB dressing, bumps against wall on L side; recommended he sit to place feet into underwear & pants, tries to lean against wall to tie drawstring on pants  Toileting: Contact guard assistance;Stand by assistance  Toileting Skilled Clinical Factors: close SB/CGA due to mild posterior leaning, cues to use Gbs  Additional Comments: shower chair available however he chose to stand for duration of shower, safety concerns when trying to bend over to dry off shins & feet; highly recommended he sit to reduce fall risk. Pt is very tall 6'8''    Goals  Patient Goals   Patient goals : \"get back to normal & go back to work\"  Short Term Goals  Time Frame for Short Term Goals: by 5 days pt will complete  Short Term Goal 1: Grooming IND'ly while standing at sink  Short Term Goal 2: UB & LB dressing IND'ly  Short Term Goal 3: toileting IND'ly  Short Term Goal 4: household transfers IND'ly  Short Term Goal 5: standing balance x 20 minutes during IADLs, pt is --will need to cook, clean & do laundry  Long Term Goals  Time Frame for Long Term Goals : by 5 days pt will complete  Long Term Goal 1: increase L  strength x 2 lbs to be IND w/ opening containers, lids  Long Term Goal 2: address vision further using vision disk, eye chart, scaning and tracking w/ 100% accuracy    Assessment  Performance deficits / Impairments: Decreased functional mobility ; Decreased safe awareness;Decreased balance;Decreased coordination;Decreased ADL status; Decreased vision/visual deficit; Decreased high-level IADLs;Decreased strength;Decreased fine motor control  Assessment: pt is a 82 y/o male who arrived from [facility] after a stroke; he presented w/ HA, visual disturbance and L mingo body weakness & ataxia. PTA< he was completely IND w/ all aspects of ADLs, IADLs, was living at [employer] working F/T as . Pt takes bus or has boss pick him up. Currently he is needing up to CGA to use RW for household distances & during ADLs due to mild L side weakness, posterior sway and ataxia.  He is functioning below previous baseline and would benefit from OT services on rehab x 5 days to increase IND w/ balance, safety ADLs/IADLs and return back to work activities  Treatment Diagnosis: impaired ADLs  Prognosis: Good  Decision Making: Medium Complexity  Treatment Initiated : 1/13/23 Fri  Discharge Recommendations: Continue to assess pending progress  Occupational Therapy Plan  Times Per Week: 5-6  Specific Instructions for Next Treatment: will be a short 5 day stay  Current Treatment Recommendations: Strengthening;Balance training;Functional mobility training;Gait training;Equipment evaluation, education, & procurement;Self-Care / ADL; Patient/Caregiver education & training;Return to work related activity;Home management training       Therapy Time   Individual Concurrent Group PM-treatment   Time In 0800      1305   Time Out 0915      1345   Minutes 75      40   Timed Code Treatment Minutes: [address], [place] #[id number]

## 2023-01-13 NOTE — FLOWSHEET NOTE
Ethnicity  \"Are you of , /a, or Faroese origin? \"  Check all that apply:  [x] A. No, not of , /a, or [place] Origin  [] B.  Yes, [place], [place] American, Chicano/a  [] C.  Yes, [address] [place] [address]  [] D.  Yes, [place]  [] E.  Yes, another , , or Faroese origin  [] X. Patient unable to respond    Race  \"What is your race? \"  Check all that apply:  [] A. White  [x] B. Black or   [] C. American [facility] or [place] Native  [] D.  [facility]  [] E. [place]  [] F. Tanzanian  [] G. [place]  [] Aldo Starling  [] I. [place]  [] J.  Other   [] K.   [] L. Mongolian or [place]ani  [] M. Macanese  [] N. Other [place]  [] X. Patient unable to respond    High Risk Drug Classes:  Use and Indication    Is taking: Check if the pt is taking any medications by pharmacological classification, not how it is used, in the following classes  Indication noted: If column 1 is checked, check if there is an indication noted for all meds in the drug class Is taking  (check all that apply) Indication noted (check all that apply)   Antipsychotic [] []   Anticoagulant [x] []   Antibiotic [] []   Opioid [] []   Antiplatelet [x] []   Hypoglycemic (including insulin) [] []   None of the above []     Special Treatments, Procedures, and Programs    Check all of the following treatments, procedures, and programs that apply on admission. On admission (check all that apply)   Cancer Treatments   A1. Chemotherapy []           A2. IV []           A3. Oral []           A10. Other []   B1. Radiation []   Respiratory Therapies   C1. Oxygen Therapy []           C2. Continuous []           C3. Intermittent []           C4. High-concentration []   D1. Suctioning []           D2. Scheduled []           D3. As needed []   E1. Tracheostomy Care []   F1.  Invasive Mechanical Ventilator (ventilator or respirator) []   G1. Non-invasive Mechanical Ventilator []           G2. BiPAP [] G3. CPAP []   Other   H1. IV Medications []           H2. Vasoactive medications []           H3. Antibiotics []           H4. Anticoagulation [x]           H10. Other []   I1. Transfusions []   J1. Dialysis []           J2. Hemodialysis []           J3. Peritoneal dialysis []   O1. IV access []           O2. Peripheral []           O3. Midline []           O4.  Central (PICC, tunneled, port) []      None of the above (select if no Cancer, Respiratory, or Other boxes are checked) []

## 2023-01-13 NOTE — PROGRESS NOTES
Patient was admitted to [address] [place] [address] on 1/12/2023 with CVA. Patient is alert and oriented x4. Respirations easy,Lungs clear ,on room air. Denies having chest pain/discomfort,denies having SOB. On Regular,carb control diet. Medications taken whole with thin liquids,tolerating well. Receives Heparin for DVT prophylaxis . Patient has been Continent of bowel and bladder. Skin: abrasions. Transfers using Gait belt and walker  x 1assist. Tolerates well. Safety precautions remains in place,call light in reach,bed alarm activated,will continue to monitor.

## 2023-01-13 NOTE — FLOWSHEET NOTE
Patient has voided twice since he was admitted to this unit. He walks to the bathroom x 1 assist with a walker. He stated he's been going to the  to urinate at [facility] before d/c. Refused bladder scan as he said; \"I'm not retaining urine. I've gone three times here. \"

## 2023-01-13 NOTE — PLAN OF CARE
Problem: Discharge Planning  Goal: Discharge to home or other facility with appropriate resources  1/12/2023 2221 by Meri Goodell, RN  Outcome: Progressing  1/12/2023 1945 by Meri Goodell, RN  Outcome: Progressing     Problem: Safety - Adult  Goal: Free from fall injury  Outcome: Progressing     Problem: Pain  Goal: Verbalizes/displays adequate comfort level or baseline comfort level  Outcome: Progressing

## 2023-01-13 NOTE — FLOWSHEET NOTE
Patient was admitted to ARU for PT/OT on 1/12/23; d/c'd from [facility]  He is alert and oriented. Stated he lives in [place] [address] and takes elevator to his apt on second floor. He said he has roommates and just lives there temporarily coz he left his old apt when landlord raised the rent cost.    He originally came into [facility] ED with headache, blurred vision and gait instability. 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 He was diagnosed with acute ischemic stroke (R CVA) with the usual deficit of left side weakness    He had a Neuro consult for his meningioma. To F/u with neuro outpatient. He denies he needs glasses unless he needs to read. Stated he is hungry. He is diabetic. And takes metformin bid. Blood sugar check result is 200. He is on regular diet. Given 2 turkey sandwiches, 3 cups of juice, pudding and Jello. He is a min. Asst. Says he walks with a walker since he was in [facility] has a telemonitor on showing sinus lamont at 50. He had an ECG  and VASCI-venous dupler LE complete bilateral at [place].  He has a cardiology appt on 2.7.23. He takes asa and heparin for DVT prophylaxis. Denies pain at this time. He is on prn melatonin for insomnia    He said his cousin Rob Paul will visit him and bring him fresh clothes. Oriented to room including call lights. Reminded to call if he needs to use the BR to have a BM. LBM 1/10/23.

## 2023-01-13 NOTE — CARE COORDINATION
Chart Reviewed. Met with patient to introduce  role, initiate discussion regarding DC planning, Complete ACP and to inform of weekly Team Conferences to review his progress. Referral made to spiritual care team for creation of adv directives. SOCIAL WORK ASSESSMENT      GOAL:   to return home and to work. HOME SITUATION:  Pt lives within [facility] with three other gentlemen in an apartment with 4 steps entry and 10 steps up to a bathroom and 10 more steps up to his bedroom. He works full time as a  in a school. He is totally independent with all personal care needs. He does not drive; has used the bus or boss transports him to/from work. Pcp:   [facility] for MD.  They pick him up for the appt. PHarmacy:  [facility] [address] [address]        PRIOR LEVEL OF FUNCTIONING:       PERSONAL CARE:    totally independent                                                                       DRIVES: no                                                                     FINANCES:                                                                   MEALS:   independent                             GROCERY SHOPS:      DME CURRENTLY AT HOME: none      CURRENT HOME CARE/SERVICES:none  Informed him of possible post acute services such as home care or out patient to continue his progress. PREFERRED HOME CARE:  TBD      ADVANCED DIRECTIVES:  Referral sent to spiritual care team for creation      TEAM CONFERENCE DAY:  Tuesdays. Informed him of weekly Team Conferences where Team will review progress, barriers, dme needs and dc date. This worker will update him weekly for DC planning needs. LSW informed patient of preferred  time on date of discharge which is between 10 - 12 noon. LSW informed patient of recommendation for PCP visit within 7 days post discharge.     TRANSPORTAITON:     he denies having missed any appts or needs of daily living due to lack of transportation.   [place], [place]     Case Management   [phone]    1/13/2023  1:15 PM

## 2023-01-13 NOTE — PROGRESS NOTES
Speech Language Pathology  Facility/Department: [facility]  Initial Speech/Language/Cognitive Assessment    NAME: Chava Robbins  : 1950   MRN: [id number]  ADMISSION DATE: 2023  ADMITTING DIAGNOSIS: has Acute ischemic right PCA stroke (HCC) and Moderate malnutrition ([facility] on their problem list.   has a past medical history of A-fib ([facility] CHF (congestive heart failure) ([facility] DM2 (diabetes mellitus, type 2) ([facility] and [facility] ([facility] Resides at [address]: 2023    Date of Eval: 2023   Evaluating Therapist: BRIAN Naik    RECENT RESULTS  MRI: 2023  IMPRESSION:   1. Large acute infarction in the right PCA territory involving right occipital lobe, inferior medial right temporal lobe and left thalamus with evidence of hemorrhagic transformation. 2. Probable 1.4 x 0.6 cm right frontal convexity meningioma. Signed By: Juliano Mora MD, [address] [place] [address]  Primary Complaint:   MD order due to recent stroke        SLP Assessment Impressions:  1. Cognitive Diagnosis: Pt demonstrated functional temporal orientation; attention; working memory and STM ;and concrete VPS/PS and reasoning on testing. Pt with deficits on testing performance for complex reasoning . Further assessment of executive function unless otherwise notified  2. Speech Diagnosis: Audible and intelligible connected speech. 3. Communication Diagnosis: [place] language skills appear intact. Pt with breakdown with complex processing (ie extra time or assist). This may be baseline. Pt was functional in verbal expression of needs/wants; CFN; concept explanations/event explanations.         Recommendations:  Recommendations  Requires SLP Intervention: Yes (f/u ongoing testing)  Patient Education Response: Verbalizes understanding;Needs reinforcement  Duration of Treatment: 1-2 times unless otherwise notified      Plan:   Speech Therapy Prognosis  Prognosis: Good  Individuals consulted  Consulted and agree with results and recommendations: Patient  Safety Devices  Safety Devices in place: Yes    Goals:  Pt goal is to get back home  Goal 1: Pt will bart ongoing assessment of cognitive-linguistic skills for executive funciton unless otherwise notified   Patient/family involved in developing goals and treatment plan: yes    Subjective:   Previous level of function and limitations: History per chart review and pt self report  Social/Functional History  Lives With: Friend(s) (shares apt w/ 3 other men)  Type of Home: Apartment [employer], bedrm/bathrm on 2nd fl)  Home Access: Stairs to enter with rails  Entrance Stairs - Number of Steps: 4 steps to enter building, has 10 steps to 2nd fl where bathrm is located, another 10 steps to bedrm on 3rd fl  Entrance Stairs - Rails: Both  Bathroom Shower/Tub: Walk-in shower  Bathroom Toilet: Standard  Bathroom Accessibility: Not accessible  Has the patient had two or more falls in the past year or any fall with injury in the past year?: No  ADL Assistance: [address]: Independent  Homemaking Responsibilities: Yes  Meal Prep Responsibility: Primary  Laundry Responsibility: Primary  Cleaning Responsibility: Primary  Shopping Responsibility: Primary  Ambulation Assistance: Independent  Transfer Assistance: Independent  Active : No  Mode of Transportation: Friends;Bus  Education: HS; some college for Recreation Education  Occupation: Full time employment  Type of Occupation:  at [employer], his boss will pick him up otherwise used [address]: watches movies  Additional Comments: sleeps in regular bed, has 1 brother & 2 sisters in town but 2 of the siblings have dementia             Objective:  Assessment included Oral Motor Speech/Voice Mechanism Exam; Cognitive -Linguistic Exam   Pain: denied  Vision  Vision: Impaired  Vision Exceptions: Wears glasses for reading (glasses not here)  Hearing  Hearing: Within functional limits    Oral Motor   Labial:  (fairly symmetrical volitional rom for rounding/retraction/protrusion)  Lingual:  (very slight deviation on protrusion/elevation)  Velum:  (symmetrical during phonation of /a/)    Motor Speech  Intelligibility: No impairment (audible and intelligible connected speech)    Auditory Comprehension  Comprehension: Within Functional Limits  Basic Questions: WFL  Complex Questions: WFL  One Step Commands: WFL  Two Step Commands: WFL  Multistep Commands: WFL  Complex/Abstract Commands: WFL (2 step sequential and spatial commands)  L/R Discrimination: WFL  Conversation: WFL    Reading Comprehension  Reading Status: Exceptions to [facility]  Words Impairment Severity: WFL  Phrases Impairment Severity: WFL  Sentence Impairment Severity: WFL  Paragraph Impairment Severity: Minimal (extra time and intermittent assist with mild complex 4-7 line paragraph information. However if topic is not familiar increase assist. Unclear baseline)  Effective Techniques: Large print    Expression  Primary Mode of Expression: Verbal  Verbal Expression  Verbal Expression: Within functional limits (WFL for CFN; responsive naming; concrete and mild complex concept explanation)    Written Expression  Dominant Hand: Right  Written Expression:  (DNT)    Pragmatics/Social Functioning  Pragmatics: Within functional limits (WFL during testing.  However overall reduced eye contact)    Cognition:   Orientation  Overall Orientation Status: Within Functional Limits (temporal orientation WFL on testing)  Attention  Attention: Exceptions to [facility]  Alternating Attention: WFL (WFL on testing; needed extra time)  Divided Attention:  (TBA)  Selective Attention: [facility]  Sustained Attention: [facility]  Memory  Memory: Within Functional Limits  Short-term Memory: WFL (grossly WFL on testing.)  Working Memory: WFL (grossly WFL on testing)  Problem Solving  Problem Solving: Exceptions to [facility]  Simple Functional Tasks: [facility]  Verbal Reasoning Skills: Mild (breakdown with abstraction)  Executive Function Skills:  (TBA;unless otherwise notified)  Numeric Reasoning  Numeric Reasoning: Within Functional Limits (WFL on testing for concrete calc and time measurements)  Abstract Reasoning  Abstract Reasoning: Exceptions to [facility]  Convergent Thinking: Mild (breakdown with complex/abstraction)  Divergent Thinking: Mild (breakdown with complex/abstraction)  Safety/Judgment  Safety/Judgment: Within Functional Limits (WFL on testing; ongoing assess with executive function unless otherwise notified)      Prognosis:  Speech Therapy Prognosis  Prognosis: Good  Individuals consulted  Consulted and agree with results and recommendations: Patient    Education:  Patient Education Response: Verbalizes understanding;Needs reinforcement  Safety Devices in place: Yes       Therapy Time:   Individual   Time In 1515   Time Out 1550   Minutes 35           Electronically signed by   Med Gao. FarhanMS,CCC,SLP [id number]  Speech and Language Pathologist    on 1/13/2023 at 4:01 PM

## 2023-01-13 NOTE — PLAN OF CARE
ARU PATIENT TREATMENT PLAN  [address] Giovani Rogel   [phone]    Rosalie Munoz    : 1950  Acct #: [de-identified]  MRN: [id number]   PHYSICIAN:  Tyrone Leal MD  Primary Problem    Patient Active Problem List   Diagnosis    Acute ischemic right PCA stroke ([address]    Moderate malnutrition [facility])       Rehabilitation Diagnosis:     Acute ischemic right PCA stroke [facility]) [I63.531]       ADMIT DATE:2023    Patient Goals: \"get back to normal & go back to work\"    Admitting Impairments: left hemiparesis, left VF deficit, decreased balance, cognition     Acute ischemic right PCA stroke with hemorrhagic transformation  -Localization: P2 occlusion affecting R temporal and occipital lobes, thalamus  -Etiology: concern for embolic source  -Secondary ppx: ASA, statin  -Telemetry while inpatient, then cardiac event monitor  -PT/OT/SLP     DM2 with hyperglycemia  -Hgb A1C 10  -metformin, ISS     dCHF  -Daily wt     SHELLIE vs CKD  -Cr 1.3-1.5 at [facility]  -Avoid nephrotoxins, renally dose meds  -Monitor renal function     Right frontal convexity Meningioma  -Incidental finding  -Plan for f/u with Dr. Herbert [facility] as outpatient     Bladder   -High risk retention   -Monitor PVRs, SC prn >300cc     Bowel   -High risk constipation   -senna+colace BID, PRN miralax, MoM, and bisacodyl supp.      Barriers: left hemiparesis, left VF deficit, decreased balance, cognition, medical comorbidities  Participation: good     CARE PLAN     NURSING:  Rosalie Munoz while on this unit will:     [] Be continent of bowel and bladder     [x] Have an adequate number of bowel movements  [x] Urinate with no urinary retention >300ml in bladder  [] Complete bladder protocol with gramajo removal  [x] Maintain O2 SATs at 92%  [x] Have pain managed while on ARU       [] Be pain free by discharge   [x] Have no skin breakdown while on ARU  [] Have improved skin integrity via wound measurements  [] Have no signs/symptoms of infection at the wound site  [x] Be free from injury during hospitalization   [x] Complete education with patient/family with understanding demonstrated for:Stroke  [x] Adjustment   [x] Other:   Nursing interventions may include bowel/bladder training, education for medical assistive devices, medication education, O2 saturation management, energy conservation, stress management techniques, fall prevention, alarms protocol, seating and positioning, skin/wound care, pressure relief instruction,dressing changes,  infection protection, DVT prophylaxis, and/or assistance with in room safety with transfers to bed, toilet, wheelchair, shower as well as bathroom activities and hygiene. Patient/caregiver education for:   [x] Disease/sustained injury/management      [x] Medication Use   [] Surgical intervention   [x] Safety   [x] Body mechanics and or joint protection   [x] Health maintenance         PHYSICAL THERAPY:  Goals:                  Short Term Goals  Time Frame for Short Term Goals: 1 week  Short Term Goal 1: I bed mobility  Short Term Goal 2: Mod I for all functional transfers  Short Term Goal 3: Amb 200' with Mod I and LRAD  Short Term Goal 4: Up/down curb with mod I  Short Term Goal 5: Up/down 12 steps with mod I               These goals were reviewed with this patient at the time of assessment and Otoniel Albrecht is in agreement. Plan of Care: Pt to be seen 90   mins per day for 5-6 day/week 5 days.                    Current Treatment Recommendations: Strengthening, Balance training, Functional mobility training, Transfer training, Endurance training, Gait training, Stair training, Neuromuscular re-education, Return to work related activity, Home exercise program, Safety education & training, Patient/Caregiver education & training, Equipment evaluation, education, & procurement, Therapeutic activities      OCCUPATIONAL THERAPY:  Goals:             Short Term Goals  Time Frame for Short Term Goals: by 5 days pt will complete  Short Term Goal 1: Grooming IND'ly while standing at sink  Short Term Goal 2: UB & LB dressing IND'ly  Short Term Goal 3: toileting IND'ly  Short Term Goal 4: household transfers IND'ly  Short Term Goal 5: standing balance x 20 minutes during IADLs, pt is --will need to cook, clean & do laundry :  Long Term Goals  Time Frame for Long Term Goals : by 5 days pt will complete  Long Term Goal 1: increase L  strength x 2 lbs to be IND w/ opening containers, lids  Long Term Goal 2: address vision further using vision disk, eye chart, scaning and tracking w/ 100% accuracy : These goals were reviewed with this patient at the time of assessment and Leslie Eisenberg is in agreement    Plan of Care:  Pt to be seen 90   mins per day for 5-6 day/week x 5 days           SPEECH THERAPY: Goals will be left blank if speech is not following this patient. Goals:                                                         Short Term Goals   Goals: Pt goal is to get back home  Goal 1: [facility] assessment of cognitive-linguistic skills for executive funciton unless otherwise notified              Time Frame for Short Term Goals: Pt goal is to get back home  These goals were reviewed with this patient at the time of assessment and Leslie Eisenberg is in agreement    Plan of Care: Pt to be seen 30   mins per day for 1-2 times unless otherwise notified. CASE MANAGEMENT:  Goals:   Assist patient/family with discharge planning, patient/family counseling,   and coordination with insurance during ARU stay.       Admission Period/Goal QIM CODES   QIM  Admit/Goal Score    Eating     Oral Hygiene     Toileting Hygiene     Shower/Bathe Self     Upper Body Dressing     Lower Body Dressing     Putting on/Taking off Footwear     Roll Left and Right     Sit to Lying     Lying to Sitting on Side of Bed     Sit to Stand     Chair/Bed to Chair Transfer     Toilet Transfer     Car Transfer     Walk 10 feet     Walk 50 feet with 2 Turns     Walk 150 feet with 2 turns     Walk 10 feet on Uneven Surfaces     1 Step     4 Steps     12 Steps     Picking up Object     Wheel 50 feet with 2 Turns   Type? Wheel 150 feet with 2 Turns   Type? Queenie Mariscal will be seen a minimum of 3 hours of therapy per day, a minimum of 5 out of 7 days per week. [] In this rare instance due to the nature of this patient's medical involvement, this patient will be seen 15 hours per week (900 minutes within a 7 day period). Treatments may include therapeutic exercises, gait training, neuromuscular re-ed, transfer training, community reintegration, bed mobility, w/c mobility and training, self care, home mgmt, cognitive training, energy conservation,dysphagia tx, speech/language/communication therapy, group therapy, and patient/family education. In addition, dietician/nutritionist may monitor calorie count as well as intake and collaboratively work with SLP on dietary upgrades. Neuropsychology/Psychology may evaluate and provide necessary support.     Medical issues being managed closely and that require 24 hour availability of a physician:   [x] Swallowing Precautions  [x] Bowel/Bladder Fx  [] Weight bearing precautions   [] Wound Care    [x] Pain Mgmt   [x] Infection Protection   [x] DVT Prophylaxis   [x] Fall Precautions  [x] Fluid/Electrolyte/Nutrition Balance   [] Voice Protection   [] Respiratory  [] Other:    Medical Prognosis: [x] Good  [] Fair    [] Guarded   Total expected IRF days 7-10 days  Anticipated discharge destination:    [] Home Independently   [x] Home Modified Independent  [] Home with supervision    []SNF     [] Other                                           Physician anticipated functional outcomes:  mod for mobility and ADLs   IPOC brief synthesis: Patient is a 82 yo M with pmh DM2 who initially [facility] the [facility] with headache, blurred vision, and difficulty ambulating. CTA head/neck revealed right PCA P2 segment occlusion. MRI brain showed acute ischemic infarcts in the right PCA distribution (right temporal and occipital lobes) with hemorrhagic transformation. There is concern for cardioembolic etiology. HUGO with PFO. Neurology recommending ASA, statin, and cardiac event monitor. Course complicated by hyperglycemia, SHELLIE, and incidental finding of meningioma. Now presents to ARU with impaired mobility, self-care, and cognition below his baseline. He requires comprehensive inpatient rehab program in order to return to community setting. I have reviewed this initial plan of care and agree with its contents:    Title   Name    Date    Time    Physician: Abran Mcintyre. Tsering Hernández MD 1/13/2023, 5:09 PM      Case Mgmt:  Patricio Hwang, [place]     Case Management   [phone]    1/13/2023  4:17 PM      OT: Minus Form, OTR/L #[id number]     PT:  Brianne Jackson, PT #[id number]    ST:  Estelle Lala. Flum,MS,CCC,SLP [id number] Speech and Language Pathologist signed 1/13/2023 at 1618 pm      ARU Lyndsay Beltran [address]    Other:

## 2023-01-13 NOTE — FLOWSHEET NOTE
4 Eyes Skin Assessment     NAME:  Jeremy Burnette  YOB: 1950  MEDICAL RECORD NUMBER:  [id number]    The patient is being assessed for  Admission    I agree that One RN have performed a thorough Head to Toe Skin Assessment on the patient. ALL assessment sites listed below have been assessed. Areas assessed by both nurses:    Head, Face, Ears, Shoulders, Back, Chest, Arms, Elbows, Hands, Sacrum. Buttock, Coccyx, Ischium, and Legs. Feet and Heels        Does the Patient have a Wound?  No noted wound(s)       Reginaldo Prevention initiated by RN: NA   Wound Care Orders initiated by RN: NA    Pressure Injury (Stage 3,4, Unstageable, DTI, NWPT, and Complex wounds) if present place referral order by RN under : NA    New and Established Ostomies, if present place, referral order under : NA      Nurse 1 eSignature: Electronically signed by Don Ferreira RN on 1/12/23 at 11:25 PM EST    **SHARE this note so that the co-signing nurse is able to place an eSignature**    Nurse 2 eSignature: Electronically signed by Jose Angel Saldaña RN on 1/13/23 at 3:01 AM EST

## 2023-01-13 NOTE — ACP (ADVANCE CARE PLANNING)
Advance Care Planning     Advance Care Planning Activator (Inpatient)  Conversation Note      Date of ACP Conversation: 1/13/2023     Conversation Conducted with: Patient with Decision Making Capacity    ACP Activator: [id number] Decision Maker:     Dyana Vee  cousin   [phone]  Darrel Otto, Sister  [phone]    Current Designated Health Care Decision Maker:     Click here to complete Healthcare Decision Makers including section of the Healthcare Decision Maker Relationship (ie \"Primary\")      Care Preferences    Ventilation: \"If you were in your present state of health and suddenly became very ill and were unable to breathe on your own, what would your preference be about the use of a ventilator (breathing machine) if it were available to you? \"      Would the patient desire the use of ventilator (breathing machine)?: yes    \"If your health worsens and it becomes clear that your chance of recovery is unlikely, what would your preference be about the use of a ventilator (breathing machine) if it were available to you? \"     Would the patient desire the use of ventilator (breathing machine)?: No      Resuscitation  \"CPR works best to restart the heart when there is a sudden event, like a heart attack, in someone who is otherwise healthy. Unfortunately, CPR does not typically restart the heart for people who have serious health conditions or who are very sick. \"    \"In the event your heart stopped as a result of an underlying serious health condition, would you want attempts to be made to restart your heart (answer \"yes\" for attempt to resuscitate) or would you prefer a natural death (answer \"no\" for do not attempt to resuscitate)? \" yes       [] Yes   [] No   Educated Patient / Rodriguez Winston regarding differences between Advance Directives and portable DNR orders.     Length of ACP Conversation in minutes:  2 minutes    Conversation Outcomes:  [x] ACP discussion completed  [] Existing advance directive reviewed with patient; no changes to patient's previously recorded wishes  [] New Advance Directive completed  [] Portable Do Not Rescitate prepared for Provider review and signature  [] POLST/POST/MOLST/MOST prepared for Provider review and signature      Follow-up plan:    [x] Schedule follow-up conversation to continue planning  [] Referred individual to Provider for additional questions/concerns   [] Advised patient/agent/surrogate to review completed ACP document and update if needed with changes in condition, patient preferences or care setting    [] This note routed to one or more involved healthcare providers    Referral made to spiritual Care Team for creation of adv directives.     [place], [place]     Case Management   [phone]    1/13/2023  1:08 PM

## 2023-01-13 NOTE — CONSULTS
Comprehensive Nutrition Assessment    Type and Reason for Visit:  Initial, Consult    Nutrition Recommendations/Plan:   Add carbohydrate and sodium modification to diet given hx CHF and DM  Add Glucerna daily to start     Malnutrition Assessment:  Malnutrition Status: Moderate malnutrition (01/13/23 1307)    Context:  Acute Illness     Findings of the 6 clinical characteristics of malnutrition:  Energy Intake:  No significant decrease in energy intake  Weight Loss:  No significant weight loss     Body Fat Loss:  Mild body fat loss Orbital, Buccal region   Muscle Mass Loss:  Mild muscle mass loss Temples (temporalis)  Fluid Accumulation:  No significant fluid accumulation     Strength:  Not Performed    Nutrition Assessment:    Consult per ARU protocol. PMH includes CHF, DM, Meningioma. Pt adm for debility r/t right pca stroke. Diet adv to regular. Pt reported and records confirm good po intake at %. With current BS running in the 200s and hx of HF, pt agreed to adding mild sodium and carb modifications. Pt does have some mild muscle and fat wasting. Will offer Glucerna daily to start. Nutrition Related Findings:    Labs reviewed. Noted BM on 1/10. Noted no edema. Wound Type: None       Current Nutrition Intake & Therapies:    Average Meal Intake: %     ADULT DIET; Regular; 5 carb choices (75 gm/meal)    Anthropometric Measures:  Height: 6' 8\" (203.2 cm)  Ideal Body Weight (IBW): 226 lbs (103 kg)    Admission Body Weight: 168 lb (76.2 kg)  Current Body Weight: 168 lb (76.2 kg),   IBW.  Weight Source: Bed Scale  Current BMI (kg/m2): 18.5                          BMI Categories: Underweight (BMI less than 22) age over 72    Estimated Daily Nutrient Needs:        Energy (kcal/day): 0507-7235 (30-35 x ABW 76 kg)     Protein (g/day):  (1.2-1.5 x ABW 76 kg)     Fluid (ml/day): < 64 oz per day per CHF protocol    Nutrition Diagnosis:   Moderate malnutrition related to inadequate protein-energy intake as evidenced by Criteria as identified in malnutrition assessment    Nutrition Interventions:   Food and/or Nutrient Delivery: Start Oral Nutrition Supplement, Modify Current Diet  Nutrition Education/Counseling: No recommendation at this time  Coordination of Nutrition Care: Continue to monitor while inpatient       Goals:     Goals: PO intake 50% or greater       Nutrition Monitoring and Evaluation:   Behavioral-Environmental Outcomes: None Identified  Food/Nutrient Intake Outcomes: Food and Nutrient Intake, Supplement Intake  Physical Signs/Symptoms Outcomes: Biochemical Data, Constipation, Diarrhea, Fluid Status or Edema, Weight, Skin    Discharge Planning:     Too soon to determine     Aneta Goncalves, [address] [place] [address],   Contact: [phone]

## 2023-01-14 LAB
GLUCOSE BLD-MCNC: 163 MG/DL (ref 70–99)
GLUCOSE BLD-MCNC: 173 MG/DL (ref 70–99)
GLUCOSE BLD-MCNC: 183 MG/DL (ref 70–99)
GLUCOSE BLD-MCNC: 226 MG/DL (ref 70–99)
PERFORMED ON: ABNORMAL

## 2023-01-14 PROCEDURE — 6360000002 HC RX W HCPCS: Performed by: PHYSICAL MEDICINE & REHABILITATION

## 2023-01-14 PROCEDURE — 94760 N-INVAS EAR/PLS OXIMETRY 1: CPT

## 2023-01-14 PROCEDURE — 97112 NEUROMUSCULAR REEDUCATION: CPT

## 2023-01-14 PROCEDURE — 1280000000 HC REHAB R&B

## 2023-01-14 PROCEDURE — 97116 GAIT TRAINING THERAPY: CPT

## 2023-01-14 PROCEDURE — 97535 SELF CARE MNGMENT TRAINING: CPT

## 2023-01-14 PROCEDURE — 97530 THERAPEUTIC ACTIVITIES: CPT

## 2023-01-14 PROCEDURE — 97110 THERAPEUTIC EXERCISES: CPT

## 2023-01-14 PROCEDURE — 6370000000 HC RX 637 (ALT 250 FOR IP): Performed by: PHYSICAL MEDICINE & REHABILITATION

## 2023-01-14 RX ADMIN — ATORVASTATIN CALCIUM 80 MG: 80 TABLET, FILM COATED ORAL at 20:17

## 2023-01-14 RX ADMIN — HEPARIN SODIUM 5000 UNITS: 5000 INJECTION INTRAVENOUS; SUBCUTANEOUS at 12:35

## 2023-01-14 RX ADMIN — ACETAMINOPHEN 325MG 650 MG: 325 TABLET ORAL at 20:17

## 2023-01-14 RX ADMIN — INSULIN LISPRO 1 UNITS: 100 INJECTION, SOLUTION INTRAVENOUS; SUBCUTANEOUS at 07:50

## 2023-01-14 RX ADMIN — METFORMIN HYDROCHLORIDE 1000 MG: 500 TABLET ORAL at 07:49

## 2023-01-14 RX ADMIN — HEPARIN SODIUM 5000 UNITS: 5000 INJECTION INTRAVENOUS; SUBCUTANEOUS at 23:48

## 2023-01-14 RX ADMIN — ASPIRIN 81 MG: 81 TABLET, COATED ORAL at 07:49

## 2023-01-14 ASSESSMENT — PAIN DESCRIPTION - ORIENTATION: ORIENTATION: POSTERIOR;OTHER (COMMENT)

## 2023-01-14 ASSESSMENT — PAIN - FUNCTIONAL ASSESSMENT: PAIN_FUNCTIONAL_ASSESSMENT: PREVENTS OR INTERFERES SOME ACTIVE ACTIVITIES AND ADLS

## 2023-01-14 ASSESSMENT — PAIN DESCRIPTION - DESCRIPTORS: DESCRIPTORS: ACHING

## 2023-01-14 ASSESSMENT — PAIN DESCRIPTION - FREQUENCY: FREQUENCY: INTERMITTENT

## 2023-01-14 ASSESSMENT — PAIN DESCRIPTION - LOCATION: LOCATION: HEAD

## 2023-01-14 ASSESSMENT — PAIN DESCRIPTION - ONSET: ONSET: GRADUAL

## 2023-01-14 ASSESSMENT — PAIN SCALES - GENERAL
PAINLEVEL_OUTOF10: 5
PAINLEVEL_OUTOF10: 0

## 2023-01-14 ASSESSMENT — PAIN DESCRIPTION - PAIN TYPE: TYPE: ACUTE PAIN

## 2023-01-14 NOTE — PROGRESS NOTES
Occupational Therapy  Pt seen for additional courtesy tx. He is able to get out of bed IND'ly, and donned non skid socks & shoes by crossing legs & bending over. He was given cues to pause upon standing since he is very tall; mild dizziness reported. He was able to ambulate up & down hallway using RW, mostly w/ SB/CGA--he is forgetful of safe hand placement and tends to plop down into recliner. He ambulated on carpeted area and sat on couch--close SB/CGA to get up from lower surfaces. Pt is able to get back into bed IND'ly. He required assistance to locate certain channel on TV, has L visual field loss and required assistance to change channel on remote. Recommend continued OT services to address balance during IADLs and visual scanning.  Tx time: 15 minutes, cont w/ POC   Konrad Benedict, OTR/L #[id number]   Therapy Time     Individual Co-treatment   Time In 5944     Time Out 1500     Minutes 15

## 2023-01-14 NOTE — PROGRESS NOTES
81 y.o. patient admitted to rehab with R CVA. A/Ox4. Transfers with walker 1. Mobility restrictions: none. Pt sometimes impulsive. On select 5 carb diet. Poor appetite. Medications taken whole with thins. Pt on tele. On ASA, Heparin for DVT prophylaxis. Skin: WNL with old scars. Dryness and thick, flaky toenails. Oxygen: RA. LDA: none. Has been continent of bowel and continent of bladder. LBM 1/13. Chair/bed alarms in use and call light in reach. Will monitor for safety.

## 2023-01-14 NOTE — PROGRESS NOTES
Occupational Therapy  Facility/Department: [facility]  Rehabilitation Occupational Therapy Daily Treatment Note    Date: 23  Patient Name: Esmer Kowalski       Room: P5D-0594/0730-45  MRN: [id number]  Account: [de-identified]   : 1950  (81 y.o.) Gender: male                    Past Medical History:  has a past medical history of A-fib ([facility] [address] CHF (congestive heart failure) ([facility] DM2 (diabetes mellitus, type 2) ([facility] and Meningioma ([facility] Past Surgical History:   has no past surgical history on file. Restrictions  Restrictions/Precautions: Fall Risk  Other position/activity restrictions: reg diet    Subjective  Subjective: Patient supine in bed upon arrival to room. Agreeable to therapy  Restrictions/Precautions: Fall Risk             Objective     Cognition  Overall Cognitive Status: Exceptions  Memory: Decreased short term memory  Insights: Decreased awareness of deficits  Cognition Comment: pt is cooperative, mild word finding & mild slow processing  Orientation  Overall Orientation Status: Within Normal Limits         ADL  Feeding  Assistance Level: Independent  Grooming/Oral Hygiene  Assistance Level: Stand by assist  Skilled Clinical Factors: stood to wash hands with SBA  Putting On/Taking Off Footwear  Assistance Level: Independent  Skilled Clinical Factors: crossed LE's to don shoes  Toileting  Assistance Level: Stand by assist  Toilet Transfers  Equipment: Grab bars  Assistance Level: Stand by assist;Contact guard assist    Instrumental ADL's  Instrumental ADLs: Yes  Meal Prep  Meal Prep Level:  Other (No device)  Meal Prep Level of Assistance: Stand by assistance  Meal Preparation: Able to gather all needed items to make toast. Stood for ~6:00 to complete task without device with SBA, no LOB noted     Functional Mobility  Device: Rolling walker  Activity: To/From therapy gym  Assistance Level: Stand by assist  Skilled Clinical Factors: Functional mobility with RW with CGA/SBA, no overt LOB noted. Functional mobility without RW with CGA/SBA, no overt LOB noted. Sit to Supine  Assistance Level: Stand by assist  Skilled Clinical Factors: hospital bed, no rail, HOB flat  Supine to Sit  Assistance Level: Stand by assist  Skilled Clinical Factors: hospital bed, no rail, HOB flat  Sit to Stand  Assistance Level: Stand by assist;Contact guard assist  Stand to Sit  Assistance Level: Stand by assist;Contact guard assist  Bed To/From Chair  Assistance Level: Stand by assist;Contact guard assist   OT Exercises  Exercise Treatment: 3# free weights with B UE's 15 reps  Motor Control/Coordination: black digi flex with B hands 25 reps x2     Assessment  Assessment  Assessment: Patient tolerated session well. Completed supine<>sit in hospital bed wih HOB flat. SBA/CGA for sit<>stand to/from bed, chair with arms and recliner chair. Functional mobility with and without RW with SBA/CGA, no overt LOB noted. Stood for ~6 minutes to make toast with SBA. Patient is making good progress towards goals. Cont with POC. Activity Tolerance: Patient tolerated treatment well  Safety Devices  Type of devices: Bed alarm in place;Call light within reach;Nurse notified;Gait belt    Patient Education  Education  Education Given To: Patient  Education Provided: Role of Therapy;Plan of Care;Safety; Mobility Training;Transfer Training;Visual Perceptual Function    Plan  Occupational Therapy Plan  Times Per Week: 5-6  Times Per Day: Twice a day    Goals  Patient Goals   Patient goals : \"get back to normal & go back to work\"  Short Term Goals  Time Frame for Short Term Goals: by 5 days pt will complete  Short Term Goal 1: Grooming IND'ly while standing at sink  Short Term Goal 2: UB & LB dressing IND'ly  Short Term Goal 3: toileting IND'ly  Short Term Goal 4: household transfers IND'ly  Short Term Goal 5: standing balance x 20 minutes during IADLs, pt is --will need to cook, clean & do laundry  Long Term Goals  Time Frame for Long Term Goals : by 5 days pt will complete  Long Term Goal 1: increase L  strength x 2 lbs to be IND w/ opening containers, lids  Long Term Goal 2: address vision further using vision disk, eye chart, scaning and tracking w/ 100% accuracy          Therapy Time   Individual Concurrent Group Co-treatment   Time In 1110         Time Out 1225         Minutes 75                 Electronically signed by Clary Sanchez, [id number] on 1/14/2023 at 12:28 PM

## 2023-01-14 NOTE — PROGRESS NOTES
Pt exited bed on own to use bathroom this morning around 1100. Educated the pt on using his call light, the risk of falls and the fall contract. Pt stated \"nobody went over any of this. \" Pt stated that he would use his call light in the future. Will monitor pt and consider ALFONZO cam if pt continues to get up without calling.

## 2023-01-14 NOTE — PLAN OF CARE
Problem: Nutrition Deficit:  Goal: Optimize nutritional status  Outcome: Progressing     Problem: Hematologic - Adult  Goal: Maintains hematologic stability  Outcome: Progressing     Problem: Metabolic/Fluid and Electrolytes - Adult  Goal: Electrolytes maintained within normal limits  Outcome: Progressing  Goal: Hemodynamic stability and optimal renal function maintained  Outcome: Progressing  Goal: Glucose maintained within prescribed range  Outcome: Progressing  Flowsheets (Taken 1/13/2023 2142)  Glucose maintained within prescribed range: Monitor blood glucose as ordered     Problem: Infection - Adult  Goal: Absence of infection at discharge  Outcome: Progressing  Goal: Absence of infection during hospitalization  Outcome: Progressing  Goal: Absence of fever/infection during anticipated neutropenic period  Outcome: Progressing     Problem: Genitourinary - Adult  Goal: Absence of urinary retention  Outcome: Progressing  Flowsheets (Taken 1/13/2023 2142)  Absence of urinary retention: Assess patients ability to void and empty bladder     Problem: Gastrointestinal - Adult  Goal: Minimal or absence of nausea and vomiting  Outcome: Progressing  Flowsheets (Taken 1/13/2023 2142)  Minimal or absence of nausea and vomiting: Provide nonpharmacologic comfort measures as appropriate  Goal: Maintains or returns to baseline bowel function  Outcome: Progressing  Flowsheets (Taken 1/13/2023 2142)  Maintains or returns to baseline bowel function: Assess bowel function  Goal: Maintains adequate nutritional intake  Outcome: Progressing  Flowsheets (Taken 1/13/2023 2142)  Maintains adequate nutritional intake: Monitor percentage of each meal consumed     Problem: Musculoskeletal - Adult  Goal: Return mobility to safest level of function  Outcome: Progressing  Flowsheets (Taken 1/13/2023 2142)  Return Mobility to Safest Level of Function: Assess patient stability and activity tolerance for standing, transferring and ambulating with or without assistive devices  Goal: Maintain proper alignment of affected body part  Outcome: Progressing  Goal: Return ADL status to a safe level of function  Outcome: Progressing  Flowsheets (Taken 1/13/2023 2142)  Return ADL Status to a Safe Level of Function: Assist and instruct patient to increase activity and self care as tolerated     Problem: Skin/Tissue Integrity - Adult  Goal: Skin integrity remains intact  Outcome: Progressing  Flowsheets (Taken 1/13/2023 2142)  Skin Integrity Remains Intact: Monitor for areas of redness and/or skin breakdown  Goal: Incisions, wounds, or drain sites healing without S/S of infection  Outcome: Progressing  Goal: Oral mucous membranes remain intact  Outcome: Progressing  Flowsheets (Taken 1/13/2023 2142)  Oral Mucous Membranes Remain Intact: Assess oral mucosa and hygiene practices     Problem: Cardiovascular - Adult  Goal: Maintains optimal cardiac output and hemodynamic stability  Outcome: Progressing  Flowsheets (Taken 1/13/2023 2142)  Maintains optimal cardiac output and hemodynamic stability: Monitor blood pressure and heart rate  Goal: Absence of cardiac dysrhythmias or at baseline  Outcome: Progressing  Flowsheets (Taken 1/13/2023 2142)  Absence of cardiac dysrhythmias or at baseline: Monitor cardiac rate and rhythm     Problem: Respiratory - Adult  Goal: Achieves optimal ventilation and oxygenation  Outcome: Progressing  Flowsheets (Taken 1/13/2023 2142)  Achieves optimal ventilation and oxygenation: Assess for changes in respiratory status     Problem: Neurosensory - Adult  Goal: Achieves stable or improved neurological status  Outcome: Progressing  Flowsheets (Taken 1/13/2023 2142)  Achieves stable or improved neurological status: Assess for and report changes in neurological status  Goal: Absence of seizures  Outcome: Progressing  Flowsheets (Taken 1/13/2023 2142)  Absence of seizures: If seizure occurs, turn head to side and suction secretions as needed  Goal: Remains free of injury related to seizures activity  Outcome: Progressing  Flowsheets (Taken 1/13/2023 2142)  Remains free of injury related to seizure activity: If seizure occurs, turn patient to side and suction secretions as needed  Goal: Achieves maximal functionality and self care  Outcome: Progressing  Flowsheets (Taken 1/13/2023 2142)  Achieves maximal functionality and self care: Encourage and assist patient to increase activity and self care with guidance from physical therapy/occupational therapy     Problem: Pain  Goal: Verbalizes/displays adequate comfort level or baseline comfort level  Outcome: Progressing     Problem: Safety - Adult  Goal: Free from fall injury  Outcome: Progressing     Problem: Discharge Planning  Goal: Discharge to home or other facility with appropriate resources  Outcome: Progressing  Flowsheets (Taken 1/13/2023 2142)  Discharge to home or other facility with appropriate resources: Identify barriers to discharge with patient and caregiver

## 2023-01-14 NOTE — PROGRESS NOTES
Physical Therapy  Facility/Department: [facility]  Rehabilitation Physical Therapy Treatment Note    NAME: Jesse Whitaker  : 1950 (81 y.o.)  MRN: [id number]  CODE STATUS: Full Code    Date of Service: 23       Restrictions:  Restrictions/Precautions: Fall Risk  Position Activity Restriction  Other position/activity restrictions: reg diet     Pertinent medical information:  Additional Pertinent Hx: Patient is a 82 yo M with pmh DM2 who initially presented to the [address] [place] [address] Road on 2023 with headache, blurred vision, and difficulty ambulating. CTA head/neck revealed right PCA P2 segment occlusion. MRI brain showed acute ischemic infarcts in the right PCA distribution (right temporal and occipital lobes) with hemorrhagic transformation, incidental finding of meningioma. SUBJECTIVE  Subjective  Subjective: PT started patient bedside and patient was supine in bed. Lunch was besides the bed but he reports he was not hungry and did not want to eat it. Pain: no complaints of pain at this time.     Social/Functional History  Lives With: Friend(s) (shares apt w/ 3 other men)  Type of Home: Apartment [employer], bedrm/bathrm on 2nd fl)  Home Access: Stairs to enter with rails  Entrance Stairs - Number of Steps: 4 steps to enter building, has 10 steps to 2nd fl where bathrm is located, another 10 steps to bedrm on 3rd fl  Entrance Stairs - Rails: Both  Bathroom Shower/Tub: Walk-in shower  Bathroom Toilet: Standard  Bathroom Accessibility: Not accessible  Has the patient had two or more falls in the past year or any fall with injury in the past year?: No  ADL Assistance: Independent  Homemaking Assistance: Independent  Homemaking Responsibilities: Yes  Meal Prep Responsibility: Primary  Laundry Responsibility: Primary  Cleaning Responsibility: Primary  Shopping Responsibility: Primary  Ambulation Assistance: Independent  Transfer Assistance: Independent  Active : No  Mode of Transportation: Friends, Bus  Education: [employer]; some [employer]  Occupation: Full time employment  Type of Occupation:  at [employer], his boss will pick him up otherwise used [address]: watches movies  Additional Comments: sleeps in regular bed, has 1 brother & 2 sisters in town but 2 of the siblings have dementia      OBJECTIVE    Functional Mobility  Bed Mobility  Overall Assistance Level: Independent  Sit to Supine  Assistance Level: Independent  Supine to Sit  Assistance Level: Independent  Scooting  Assistance Level: Independent    Balance  Sitting Balance: Independent  Standing Balance: Stand by assistance (with and without UE support)    Transfers  Surface: From bed;From chair with arms; To chair with arms  Device: Walker  Sit to Stand  Assistance Level: Stand by assist  Stand to Sit  Assistance Level: Stand by assist  Bed To/From Chair  Technique: Stand step  Assistance Level: Stand by assist  Skilled Clinical Factors: with and without wheeled walker. Environmental Mobility  Ambulation  Surface: Level surface; Carpet  Device: Rolling walker  Distance: 350' with multiple turns  Activity: Within Unit  Activity Comments: patient demonstrating difficulty with left visual field and caught the walker on 2 objects on his left. Assistance Level: Stand by assist  Gait Deviations: Slow karol  Skilled Clinical Factors: Patient also ambulated short distances in therapy gym and back to patient room without a device with SBA. PT Exercises  Exercise Treatment:   Patient stood and performed bilateral LE ther ex while without UE support, 10 reps with SBA-CGA: heel raises, toe raises, marching, alternating hamstring curls, seated rest break, alternating hip abduction, alternating hip flexion with knee extension, alternating hip extension with knee extension, seated rest break squats.   Patient had difficulty alternating exercises and keeping knee straight, also difficulty isolating one motion and tends to compensate into muscle groups. Squats x10 reps with CGA. patient with several LOB, but was able to recover with CGA. Patient performed 30' side stepping in each direction with SBA. Increased difficulty with maintaining balance when traveling to the right. Resistive Exercises:   Patient stood and performed resistive band ther ex with medium resistance band and knot blocked in door. He completed 10 reps shoulder extension with elbow extension, bicep curls, triceps presses, horizontal abduction, lat pull downs, standing row. PT adjusted height of anchor in door when needed. patient able to complete all before taking a seated rest break. He reported fatigue in bilateral UE with these exercises. Dynamic Standing Balance Exercises:   Patient stood and tossed bean bags at target 2 x8 without LOB with supervision. He had tendency to land bags on left side of board. Stood and tossed ball back and forth between hands, with instruction to ensure the ball goes above his head. He was supervision for balance, but had difficulty maintaining control of ball. He stooped to the ground to retrieve ball once and had dizziness, but this resolved with standing. No LOB occurred but patient did need a short seated rest break. patient stood and bounced ball back and forth between hands with supervision. Improved ball control when bouncing. He could recover balance well when he lost control and had to reach out for the ball. Patient stood at wall and brought arms above had and bounced and caught ball off the wall for 2 minutes. Bilateral UE fatigued and needed a rest break, but no LOB occurred and only lost control of the ball 3 times. Seated rest break given. Patient ambulated on line with CGA 4 x20'. He had great difficulty placing one foot in front of the other and had multiple LOB that he recovered from with CGA.   He was able to maintain balance the last 10' when given single HHA and fluidity greatly improved. Exercise Equipment:   patient performed 10 minutes on NuStep with resistance at 4, seat at 16, bilateral UE at 16, and average SPM at 49      ASSESSMENT/PROGRESS TOWARDS GOALS       Assessment  Assessment: Mr. \"Murphy\" Charlette English is tolerating high level balance activities well and ranges from CGA-SBA. He does have some minor LOB, but he can recover with CGA. He does have difficulty attending to left side of environment at times and struck two objects on his left when ambulating with wheeled walker. He demonstrated good endurance but does fatigue with high level balance activities. Patient is below baseline and will benefit from continued skilled therapy for strengthening, balance training, and neuro re-education. Activity Tolerance: Patient tolerated treatment well  Discharge Recommendations: Continue to assess pending progress; Patient would benefit from continued therapy after discharge    Goals  Patient Goals   Patient Goals : I want to go back to work  Short Term Goals  Time Frame for Short Term Goals: 1 week  Short Term Goal 1: I bed mobility  Short Term Goal 2: Mod I for all functional transfers  Short Term Goal 3: Amb 200' with Mod I and LRAD  Short Term Goal 4: Up/down curb with mod I  Short Term Goal 5: Up/down 12 steps with mod I    PLAN OF CARE/SAFETY  Physcial Therapy Plan  General Plan:  minutes of therapy at least 5 out of 7 days a week  Days Per Week: 5 Days  Hours Per Day: 1.5 hours  Therapy Duration: 4-7 Days  Current Treatment Recommendations: Strengthening;Balance training;Functional mobility training;Transfer training; Endurance training;Gait training;Stair training;Neuromuscular re-education; Return to work related activity;Home exercise program;Safety education & training;Patient/Caregiver education & training;Equipment evaluation, education, & procurement; Therapeutic activities  Safety Devices  Type of Devices:  All fall risk precautions in place;Gait belt;Left in bed;Bed alarm in place;Call light within reach    EDUCATION  Education  Education Given To: Patient  Education Provided: Role of Therapy;Plan of Care;Transfer Training;Mobility Training;Equipment; Safety  Education Provided Comments: importance of attending to left side of environment when moving  Education Method: Verbal;Demonstration  Barriers to Learning: None  Education Outcome: Verbalized understanding        Therapy Time   Individual Concurrent Group Co-treatment   Time In 1315         Time Out 1445         Minutes [address], [id number], 01/14/23 at 2:52 PM

## 2023-01-14 NOTE — FLOWSHEET NOTE
Patient was admitted to ARU on 1/12/2023 with acute ischemic right PCA stroke. Patient is A/O x4. No complaint of any pain during this shift. Requested for his prn seroquel to help him sleep. Also refused his routine senna plus at hs as he had two large episodes of BM during dayshift. Lungs clear  On room air. Denies having chest pain/discomfort,denies having SOB. On Regular,carb control diet. Medications taken whole with thin liquids,tolerating well. Receives Heparin for DVT prophylaxis . Patient has been Continent of bowel and bladder. Skin: abrasions. Transfers using Gait belt and walker  x 1assist. Tolerates well. He is on telemetry while inpatient then cardiac event monitor. This morning EKG shows 1st AV block. His baseline is sinus lamont. Left sticky note with Ayaka Sieving. Safety precautions remains in place,call light in reach,bed alarm activated,will continue to monitor as patient does have decreased safety awareness and would just get up to use the BR without waiting for assistance.

## 2023-01-14 NOTE — PLAN OF CARE
Problem: Discharge Planning  Goal: Discharge to home or other facility with appropriate resources  1/14/2023 1022 by Rozina Jackson RN  Outcome: Progressing  Flowsheets (Taken 1/14/2023 0750)  Discharge to home or other facility with appropriate resources: Identify barriers to discharge with patient and caregiver  1/13/2023 2329 by Cathryne Curling, RN  Outcome: Progressing  [address] (Taken 1/13/2023 2142)  Discharge to home or other facility with appropriate resources: Identify barriers to discharge with patient and caregiver     Problem: Safety - Adult  Goal: Free from fall injury  1/14/2023 1022 by Rozina Jackson RN  Outcome: Progressing  Flowsheets  Taken 1/14/2023 1020 by Rozina Jackson RN  Free From Fall Injury: Instruct family/caregiver on patient safety  Taken 1/13/2023 2355 by Cathryne Curling, RN  Free From Fall Injury: Instruct family/caregiver on patient safety  1/13/2023 2329 by Cathryne Curling, RN  Outcome: Progressing     Problem: Pain  Goal: Verbalizes/displays adequate comfort level or baseline comfort level  1/14/2023 1022 by Rozina Jackson RN  Outcome: Progressing  Flowsheets  Taken 1/14/2023 0503 by Cathryne Curling, RN  Verbalizes/displays adequate comfort level or baseline comfort level: Encourage patient to monitor pain and request assistance  Taken 1/14/2023 0021 by Cathryne Curling, RN  Verbalizes/displays adequate comfort level or baseline comfort level: Encourage patient to monitor pain and request assistance  1/13/2023 2329 by Cathryne Curling, RN  Outcome: Progressing     Problem: Neurosensory - Adult  Goal: Achieves stable or improved neurological status  1/14/2023 1022 by Rozina Jackson RN  Outcome: Progressing  Flowsheets (Taken 1/14/2023 0750)  Achieves stable or improved neurological status: Assess for and report changes in neurological status  1/13/2023 2329 by Cathryne Curling, RN  Outcome: Progressing  Flowsheets (Taken 1/13/2023 2142)  Achieves stable or improved neurological status: Assess for and report changes in neurological status  Goal: Absence of seizures  1/14/2023 1022 by Zeina Blanco RN  Outcome: Progressing  Flowsheets (Taken 1/14/2023 0750)  Absence of seizures: Monitor for seizure activity.   If seizure occurs, document type and location of movements and any associated apnea  1/13/2023 2329 by Courtney Paredes RN  Outcome: Progressing  [address] (Taken 1/13/2023 2142)  Absence of seizures: If seizure occurs, turn head to side and suction secretions as needed  Goal: Remains free of injury related to seizures activity  1/14/2023 1022 by Zeina Blanco RN  Outcome: Progressing  1/13/2023 2329 by Courtney Paredes RN  Outcome: Progressing  Flowsheets (Taken 1/13/2023 2142)  Remains free of injury related to seizure activity: If seizure occurs, turn patient to side and suction secretions as needed  Goal: Achieves maximal functionality and self care  1/14/2023 1022 by Zeina Blanco RN  Outcome: Progressing  1/13/2023 2329 by Courtney Paredes RN  Outcome: Progressing  [address] (Taken 1/13/2023 2142)  Achieves maximal functionality and self care: Encourage and assist patient to increase activity and self care with guidance from physical therapy/occupational therapy     Problem: Respiratory - Adult  Goal: Achieves optimal ventilation and oxygenation  1/14/2023 1022 by Zeina Blanco RN  Outcome: Progressing  Flowsheets (Taken 1/14/2023 0750)  Achieves optimal ventilation and oxygenation: Assess for changes in respiratory status  1/13/2023 2329 by Courtney Paredes RN  Outcome: Progressing  Flowsheets (Taken 1/13/2023 2142)  Achieves optimal ventilation and oxygenation: Assess for changes in respiratory status     Problem: Cardiovascular - Adult  Goal: Maintains optimal cardiac output and hemodynamic stability  1/14/2023 1022 by Zeina Blanco RN  Outcome: Progressing  Flowsheets (Taken 1/14/2023 0750)  Maintains optimal cardiac output and hemodynamic stability: Monitor blood pressure and heart rate  1/13/2023 2329 by Karis Hale RN  Outcome: Progressing  Flowsheets (Taken 1/13/2023 2142)  Maintains optimal cardiac output and hemodynamic stability: Monitor blood pressure and heart rate  Goal: Absence of cardiac dysrhythmias or at baseline  1/14/2023 1022 by Demarcus Pereira RN  Outcome: Progressing  Flowsheets (Taken 1/14/2023 0750)  Absence of cardiac dysrhythmias or at baseline: Monitor cardiac rate and rhythm  1/13/2023 2329 by Karis Hale RN  Outcome: Progressing  Flowsheets (Taken 1/13/2023 2142)  Absence of cardiac dysrhythmias or at baseline: Monitor cardiac rate and rhythm     Problem: Skin/Tissue Integrity - Adult  Goal: Skin integrity remains intact  1/14/2023 1022 by Demarcus Pereira RN  Outcome: Progressing  Flowsheets  Taken 1/14/2023 1020  Skin Integrity Remains Intact: Monitor for areas of redness and/or skin breakdown  Taken 1/14/2023 0750  Skin Integrity Remains Intact: Monitor for areas of redness and/or skin breakdown  1/13/2023 2329 by Karis Hale RN  Outcome: Progressing  Flowsheets (Taken 1/13/2023 2142)  Skin Integrity Remains Intact: Monitor for areas of redness and/or skin breakdown  Goal: Incisions, wounds, or drain sites healing without S/S of infection  1/14/2023 1022 by Demarcus Pereira RN  Outcome: Progressing  Flowsheets (Taken 1/14/2023 1020)  Incisions, Wounds, or Drain Sites Healing Without Sign and Symptoms of Infection: ADMISSION and DAILY: Assess and document risk factors for pressure ulcer development  1/13/2023 2329 by Karis Hale RN  Outcome: Progressing  Goal: Oral mucous membranes remain intact  1/14/2023 1022 by Demarcus Pereira RN  Outcome: Progressing  Flowsheets  Taken 1/14/2023 1020  Oral Mucous Membranes Remain Intact: Assess oral mucosa and hygiene practices  Taken 1/14/2023 0750  Oral Mucous Membranes Remain Intact: Assess oral mucosa and hygiene practices  1/13/2023 2329 by Karis Hale RN  Outcome: Progressing  Flowsheets (Taken 1/13/2023 2142)  Oral Mucous Membranes Remain Intact: Assess oral mucosa and hygiene practices     Problem: Musculoskeletal - Adult  Goal: Return mobility to safest level of function  1/14/2023 1022 by Srinivasa Henley RN  Outcome: Progressing  Flowsheets (Taken 1/14/2023 0750)  Return Mobility to Safest Level of Function: Assess patient stability and activity tolerance for standing, transferring and ambulating with or without assistive devices  1/13/2023 2329 by Annmarie Garcia RN  Outcome: Progressing  Flowsheets (Taken 1/13/2023 2142)  Return Mobility to Safest Level of Function: Assess patient stability and activity tolerance for standing, transferring and ambulating with or without assistive devices  Goal: Maintain proper alignment of affected body part  1/14/2023 1022 by Srinivasa Henley RN  Outcome: Progressing  1/13/2023 2329 by Annmarie Garcia RN  Outcome: Progressing  Goal: Return ADL status to a safe level of function  1/14/2023 1022 by Srinivasa Henley RN  Outcome: Progressing  Flowsheets (Taken 1/14/2023 0750)  Return ADL Status to a Safe Level of Function:   Administer medication as ordered   Assess activities of daily living deficits and provide assistive devices as needed  1/13/2023 2329 by Annmarie Garcia RN  Outcome: Progressing  Flowsheets (Taken 1/13/2023 2142)  Return ADL Status to a Safe Level of Function: Assist and instruct patient to increase activity and self care as tolerated     Problem: Gastrointestinal - Adult  Goal: Minimal or absence of nausea and vomiting  1/14/2023 1022 by Srinivasa Henley RN  Outcome: Progressing  Flowsheets (Taken 1/14/2023 0750)  Minimal or absence of nausea and vomiting: Provide nonpharmacologic comfort measures as appropriate  1/13/2023 2329 by Annmarie Garcia RN  Outcome: Progressing  Flowsheets (Taken 1/13/2023 2142)  Minimal or absence of nausea and vomiting: Provide nonpharmacologic comfort measures as appropriate  Goal: Maintains or returns to baseline bowel function  1/14/2023 1022 by Lesli Owusu RN  Outcome: Progressing  [address] (Taken 1/14/2023 0750)  Maintains or returns to baseline bowel function: Assess bowel function  1/13/2023 2329 by Pati Dangelo RN  Outcome: Progressing  Flowsheets (Taken 1/13/2023 2142)  Maintains or returns to baseline bowel function: Assess bowel function  Goal: Maintains adequate nutritional intake  1/14/2023 1022 by Lesli Owusu RN  Outcome: Progressing  Flowsheets (Taken 1/14/2023 0750)  Maintains adequate nutritional intake: Monitor percentage of each meal consumed  1/13/2023 2329 by Pati Dangelo RN  Outcome: Progressing  Flowsheets (Taken 1/13/2023 2142)  Maintains adequate nutritional intake: Monitor percentage of each meal consumed  Goal: Establish and maintain optimal ostomy function  Outcome: Progressing     Problem: Genitourinary - Adult  Goal: Absence of urinary retention  1/14/2023 1022 by Lesli Owusu RN  Outcome: Progressing  Flowsheets (Taken 1/14/2023 0750)  Absence of urinary retention: Assess patients ability to void and empty bladder  1/13/2023 2329 by Pati Dangelo RN  Outcome: Progressing  Flowsheets (Taken 1/13/2023 2142)  Absence of urinary retention: Assess patients ability to void and empty bladder  Goal: Urinary catheter remains patent  Outcome: Progressing     Problem: Infection - Adult  Goal: Absence of infection at discharge  1/14/2023 1022 by Lesli Owusu RN  Outcome: Progressing  Flowsheets  Taken 1/14/2023 0750 by Lesli Owusu RN  Absence of infection at discharge: Assess and monitor for signs and symptoms of infection  Taken 1/13/2023 2355 by Pati Dangelo RN  Absence of infection at discharge: Assess and monitor for signs and symptoms of infection  1/13/2023 2329 by Pati Dangelo RN  Outcome: Progressing  Goal: Absence of infection during hospitalization  1/14/2023 1022 by Lesli Owusu RN  Outcome: Progressing  Flowsheets (Taken 1/14/2023 0750)  Absence of infection during hospitalization: Assess and monitor for signs and symptoms of infection  1/13/2023 2329 by Li Erickson RN  Outcome: Progressing  Goal: Absence of fever/infection during anticipated neutropenic period  1/14/2023 1022 by Rochelle Johnson RN  Outcome: Progressing  1/13/2023 2329 by Li Erickson RN  Outcome: Progressing     Problem: Metabolic/Fluid and Electrolytes - Adult  Goal: Electrolytes maintained within normal limits  1/14/2023 1022 by Rochelle Johnson RN  Outcome: Progressing  Flowsheets (Taken 1/14/2023 0750)  Electrolytes maintained within normal limits: Monitor labs and assess patient for signs and symptoms of electrolyte imbalances  1/13/2023 2329 by Li Erickson RN  Outcome: Progressing  Goal: Hemodynamic stability and optimal renal function maintained  1/14/2023 1022 by Rochelle Johnson RN  Outcome: Progressing  Flowsheets (Taken 1/14/2023 0750)  Hemodynamic stability and optimal renal function maintained: Monitor intake, output and patient weight  1/13/2023 2329 by Li Erickson RN  Outcome: Progressing  Goal: Glucose maintained within prescribed range  1/14/2023 1022 by Rochelle Johnson RN  Outcome: Progressing  Flowsheets (Taken 1/14/2023 0750)  Glucose maintained within prescribed range: Monitor blood glucose as ordered  1/13/2023 2329 by Li Erickson RN  Outcome: Progressing  Flowsheets (Taken 1/13/2023 2142)  Glucose maintained within prescribed range: Monitor blood glucose as ordered     Problem: Hematologic - Adult  Goal: Maintains hematologic stability  1/14/2023 1022 by Rochelle Johnson RN  Outcome: Progressing  Flowsheets (Taken 1/14/2023 0750)  Maintains hematologic stability: Assess for signs and symptoms of bleeding or hemorrhage  1/13/2023 2329 by Li Erickson RN  Outcome: Progressing     Problem: Nutrition Deficit:  Goal: Optimize nutritional status  1/14/2023 1022 by Rochelle Johnson RN  Outcome: Progressing  1/13/2023 2329 by Li Erickson RN  Outcome: Progressing Problem: ABCDS Injury Assessment  Goal: Absence of physical injury  Outcome: Progressing

## 2023-01-15 LAB
GLUCOSE BLD-MCNC: 139 MG/DL (ref 70–99)
GLUCOSE BLD-MCNC: 152 MG/DL (ref 70–99)
GLUCOSE BLD-MCNC: 161 MG/DL (ref 70–99)
GLUCOSE BLD-MCNC: 168 MG/DL (ref 70–99)
GLUCOSE BLD-MCNC: 198 MG/DL (ref 70–99)
PERFORMED ON: ABNORMAL

## 2023-01-15 PROCEDURE — 6360000002 HC RX W HCPCS: Performed by: PHYSICAL MEDICINE & REHABILITATION

## 2023-01-15 PROCEDURE — 6370000000 HC RX 637 (ALT 250 FOR IP): Performed by: PHYSICAL MEDICINE & REHABILITATION

## 2023-01-15 PROCEDURE — 94760 N-INVAS EAR/PLS OXIMETRY 1: CPT

## 2023-01-15 PROCEDURE — 1280000000 HC REHAB R&B

## 2023-01-15 RX ADMIN — ATORVASTATIN CALCIUM 80 MG: 80 TABLET, FILM COATED ORAL at 20:59

## 2023-01-15 RX ADMIN — ASPIRIN 81 MG: 81 TABLET, COATED ORAL at 08:39

## 2023-01-15 RX ADMIN — HEPARIN SODIUM 5000 UNITS: 5000 INJECTION INTRAVENOUS; SUBCUTANEOUS at 22:45

## 2023-01-15 RX ADMIN — METFORMIN HYDROCHLORIDE 1000 MG: 500 TABLET ORAL at 08:39

## 2023-01-15 RX ADMIN — ACETAMINOPHEN 325MG 650 MG: 325 TABLET ORAL at 17:20

## 2023-01-15 RX ADMIN — SENNOSIDES AND DOCUSATE SODIUM 1 TABLET: 50; 8.6 TABLET ORAL at 20:59

## 2023-01-15 RX ADMIN — HEPARIN SODIUM 5000 UNITS: 5000 INJECTION INTRAVENOUS; SUBCUTANEOUS at 11:37

## 2023-01-15 ASSESSMENT — PAIN DESCRIPTION - ORIENTATION: ORIENTATION: OTHER (COMMENT)

## 2023-01-15 ASSESSMENT — PAIN DESCRIPTION - LOCATION: LOCATION: HEAD

## 2023-01-15 ASSESSMENT — PAIN SCALES - GENERAL
PAINLEVEL_OUTOF10: 3
PAINLEVEL_OUTOF10: 0

## 2023-01-15 ASSESSMENT — PAIN - FUNCTIONAL ASSESSMENT: PAIN_FUNCTIONAL_ASSESSMENT: ACTIVITIES ARE NOT PREVENTED

## 2023-01-15 ASSESSMENT — PAIN DESCRIPTION - ONSET: ONSET: GRADUAL

## 2023-01-15 ASSESSMENT — PAIN DESCRIPTION - PAIN TYPE: TYPE: ACUTE PAIN

## 2023-01-15 ASSESSMENT — PAIN DESCRIPTION - DESCRIPTORS: DESCRIPTORS: ACHING

## 2023-01-15 ASSESSMENT — PAIN DESCRIPTION - FREQUENCY: FREQUENCY: INTERMITTENT

## 2023-01-15 NOTE — PLAN OF CARE
Problem: Discharge Planning  Goal: Discharge to home or other facility with appropriate resources  1/15/2023 1154 by Roger Gonzalez RN  Outcome: Progressing  Flowsheets (Taken 1/15/2023 0845)  Discharge to home or other facility with appropriate resources: Identify barriers to discharge with patient and caregiver  1/15/2023 0525 by Lele Grace RN  Outcome: Progressing  1/15/2023 0523 by Lele Grace RN  Outcome: Progressing  Flowsheets (Taken 1/14/2023 2020)  Discharge to home or other facility with appropriate resources: Identify barriers to discharge with patient and caregiver     Problem: Safety - Adult  Goal: Free from fall injury  1/15/2023 1154 by Roger Gonzalez RN  Outcome: Progressing  Flowsheets (Taken 1/15/2023 1152)  Free From Fall Injury: Instruct family/caregiver on patient safety  1/15/2023 0525 by Lele Grace RN  Outcome: Progressing  1/15/2023 0523 by Lele Grace RN  Outcome: Progressing  Note: Patient free from falls this shift. Fall precautions in place at all times. Bed in lowest position with two side rails up and wheels locked. Call light within reach. Patient able and agreeable to contact for safety appropriately.       Problem: Pain  Goal: Verbalizes/displays adequate comfort level or baseline comfort level  1/15/2023 1154 by Roger Gonzalez RN  Outcome: Progressing  1/15/2023 0525 by Lele Grace RN  Outcome: Progressing  1/15/2023 0523 by Lele Grace RN  Outcome: Progressing     Problem: Neurosensory - Adult  Goal: Achieves stable or improved neurological status  1/15/2023 1154 by Roger Gonzalez RN  Outcome: Progressing  Flowsheets (Taken 1/15/2023 0845)  Achieves stable or improved neurological status: Assess for and report changes in neurological status  1/15/2023 0525 by Lele Grace RN  Outcome: Progressing  1/15/2023 0523 by Lele Grace RN  Outcome: Progressing  Flowsheets (Taken 1/14/2023 2020)  Achieves stable or improved neurological status: Assess for and report changes in neurological status  Goal: Absence of seizures  1/15/2023 1154 by Gloria Rios RN  Outcome: Progressing  Flowsheets (Taken 1/15/2023 0845)  Absence of seizures: Monitor for seizure activity.   If seizure occurs, document type and location of movements and any associated apnea  1/15/2023 0525 by Brian Begum RN  Outcome: Progressing  1/15/2023 0523 by Brian Begum RN  Outcome: Progressing  Goal: Remains free of injury related to seizures activity  1/15/2023 1154 by Gloria Rios RN  Outcome: Progressing  Flowsheets (Taken 1/15/2023 0845)  Remains free of injury related to seizure activity: If seizure occurs, turn patient to side and suction secretions as needed  1/15/2023 0525 by Brian Begum RN  Outcome: Progressing  1/15/2023 0523 by Brian Begum RN  Outcome: Progressing  Goal: Achieves maximal functionality and self care  1/15/2023 1154 by Gloria Rios RN  Outcome: Progressing  Flowsheets (Taken 1/15/2023 0845)  Achieves maximal functionality and self care: Encourage and assist patient to increase activity and self care with guidance from physical therapy/occupational therapy  1/15/2023 0525 by Brian Begum RN  Outcome: Progressing  1/15/2023 0523 by Brian Begum RN  Outcome: Progressing  Flowsheets (Taken 1/14/2023 2020)  Achieves maximal functionality and self care: Encourage and assist patient to increase activity and self care with guidance from physical therapy/occupational therapy     Problem: Respiratory - Adult  Goal: Achieves optimal ventilation and oxygenation  1/15/2023 1154 by Gloria Rios RN  Outcome: Progressing  Flowsheets (Taken 1/15/2023 0845)  Achieves optimal ventilation and oxygenation: Assess for changes in respiratory status  1/15/2023 0525 by Brian Begum RN  Outcome: Progressing  1/15/2023 0523 by Brian Begum RN  Outcome: Progressing  Flowsheets (Taken 1/14/2023 2020)  Achieves optimal ventilation and oxygenation: Assess for changes in respiratory status     Problem: Cardiovascular - Adult  Goal: Maintains optimal cardiac output and hemodynamic stability  1/15/2023 1154 by Arden Torres RN  Outcome: Progressing  Flowsheets (Taken 1/15/2023 0845)  Maintains optimal cardiac output and hemodynamic stability: Monitor blood pressure and heart rate  1/15/2023 0525 by Reji Marcelo RN  Outcome: Progressing  1/15/2023 0523 by Reji Marcelo RN  Outcome: Progressing  Flowsheets (Taken 1/14/2023 2020)  Maintains optimal cardiac output and hemodynamic stability: Monitor blood pressure and heart rate  Goal: Absence of cardiac dysrhythmias or at baseline  1/15/2023 1154 by Arden Torres RN  Outcome: Progressing  Flowsheets (Taken 1/15/2023 0845)  Absence of cardiac dysrhythmias or at baseline: Monitor cardiac rate and rhythm  1/15/2023 0525 by Reji Marcelo RN  Outcome: Progressing  1/15/2023 0523 by Reji Marcelo RN  Outcome: Progressing  Flowsheets (Taken 1/14/2023 2020)  Absence of cardiac dysrhythmias or at baseline: Monitor cardiac rate and rhythm     Problem: Skin/Tissue Integrity - Adult  Goal: Skin integrity remains intact  1/15/2023 1154 by Arden Torres RN  Outcome: Progressing  Flowsheets  Taken 1/15/2023 1152  Skin Integrity Remains Intact: Monitor for areas of redness and/or skin breakdown  Taken 1/15/2023 0845  Skin Integrity Remains Intact: Monitor for areas of redness and/or skin breakdown  1/15/2023 0525 by Reji Marcelo RN  Outcome: Progressing  1/15/2023 0523 by Reji Marcelo RN  Outcome: Progressing  Flowsheets (Taken 1/14/2023 2020)  Skin Integrity Remains Intact: Monitor for areas of redness and/or skin breakdown  Goal: Incisions, wounds, or drain sites healing without S/S of infection  1/15/2023 1154 by Arden Torres RN  Outcome: Progressing  Flowsheets  Taken 1/15/2023 1152  Incisions, Wounds, or Drain Sites Healing Without Sign and Symptoms of Infection: ADMISSION and DAILY: Assess and document risk factors for pressure ulcer development  Taken 1/15/2023 0845  Incisions, Wounds, or Drain Sites Healing Without Sign and Symptoms of Infection: ADMISSION and DAILY: Assess and document risk factors for pressure ulcer development  1/15/2023 0525 by Brian Begum RN  Outcome: Progressing  1/15/2023 0523 by Brian Begum RN  Outcome: Progressing  Flowsheets (Taken 1/14/2023 2020)  Incisions, Wounds, or Drain Sites Healing Without Sign and Symptoms of Infection: TWICE DAILY: Assess and document skin integrity  Goal: Oral mucous membranes remain intact  1/15/2023 1154 by Gloria Rios RN  Outcome: Progressing  Flowsheets  Taken 1/15/2023 1152  Oral Mucous Membranes Remain Intact: Assess oral mucosa and hygiene practices  Taken 1/15/2023 0845  Oral Mucous Membranes Remain Intact: Assess oral mucosa and hygiene practices  1/15/2023 0525 by Brian Begum RN  Outcome: Progressing  1/15/2023 0523 by Brian Begum RN  Outcome: Progressing  Flowsheets (Taken 1/14/2023 2020)  Oral Mucous Membranes Remain Intact: Assess oral mucosa and hygiene practices     Problem: Musculoskeletal - Adult  Goal: Return mobility to safest level of function  1/15/2023 1154 by Gloria Rios RN  Outcome: Progressing  Flowsheets (Taken 1/15/2023 0845)  Return Mobility to Safest Level of Function:   Assess patient stability and activity tolerance for standing, transferring and ambulating with or without assistive devices   Assist with transfers and ambulation using safe patient handling equipment as needed  1/15/2023 0525 by Brian Begum RN  Outcome: Progressing  1/15/2023 0523 by Brian Begum RN  Outcome: Progressing  Flowsheets (Taken 1/14/2023 2020)  Return Mobility to Safest Level of Function: Assess patient stability and activity tolerance for standing, transferring and ambulating with or without assistive devices  Goal: Maintain proper alignment of affected body part  1/15/2023 1154 by Keith De Anda RN  Outcome: Progressing  1/15/2023 0525 by Kings Rodriguez RN  Outcome: Progressing  1/15/2023 0523 by Kings Rodriguez RN  Outcome: Progressing  Goal: Return ADL status to a safe level of function  1/15/2023 1154 by Keith De Anda RN  Outcome: Progressing  Flowsheets (Taken 1/15/2023 0845)  Return ADL Status to a Safe Level of Function: Administer medication as ordered  1/15/2023 0525 by Kings Rodriguez RN  Outcome: Progressing  1/15/2023 0523 by Kings Rodriguez RN  Outcome: Progressing  [address] (Taken 1/14/2023 2020)  Return ADL Status to a Safe Level of Function: Administer medication as ordered     Problem: Gastrointestinal - Adult  Goal: Minimal or absence of nausea and vomiting  1/15/2023 1154 by Keith De Anda RN  Outcome: Progressing  Flowsheets (Taken 1/15/2023 0845)  Minimal or absence of nausea and vomiting: Provide nonpharmacologic comfort measures as appropriate  1/15/2023 0525 by Kings Rodriguez RN  Outcome: Progressing  1/15/2023 0523 by Kings Rodriguez RN  Outcome: Progressing  Flowsheets (Taken 1/14/2023 2020)  Minimal or absence of nausea and vomiting: Administer ordered antiemetic medications as needed  Goal: Maintains or returns to baseline bowel function  1/15/2023 1154 by Keith De Anda RN  Outcome: Progressing  Flowsheets (Taken 1/15/2023 0845)  Maintains or returns to baseline bowel function:   Assess bowel function   Encourage oral fluids to ensure adequate hydration   Administer ordered medications as needed  1/15/2023 0525 by Kings Rodriguez RN  Outcome: Progressing  1/15/2023 0523 by Kings Rodriguez RN  Outcome: Progressing  Flowsheets (Taken 1/14/2023 2020)  Maintains or returns to baseline bowel function: Assess bowel function  Goal: Maintains adequate nutritional intake  1/15/2023 1154 by Pierre Smith RN  Outcome: Progressing  Flowsheets (Taken 1/15/2023 0845)  Maintains adequate nutritional intake:   Monitor percentage of each meal consumed   Obtain nutritional consult as needed  1/15/2023 0525 by Marge Brock RN  Outcome: Progressing  1/15/2023 0523 by Marge Brock RN  Outcome: Progressing  Flowsheets (Taken 1/14/2023 2020)  Maintains adequate nutritional intake: Monitor percentage of each meal consumed  Goal: Establish and maintain optimal ostomy function  1/15/2023 1154 by Pierre Smith RN  Outcome: Progressing  1/15/2023 0525 by Marge Brock RN  Outcome: Progressing  1/15/2023 0523 by Marge Brock RN  Outcome: Progressing     Problem: Genitourinary - Adult  Goal: Absence of urinary retention  1/15/2023 1154 by Pierre Smith RN  Outcome: Progressing  Flowsheets (Taken 1/15/2023 0845)  Absence of urinary retention: Assess patients ability to void and empty bladder  1/15/2023 0525 by Marge Brock RN  Outcome: Progressing  1/15/2023 0523 by Marge Brock RN  Outcome: Progressing  Goal: Urinary catheter remains patent  1/15/2023 1154 by Pierre Smith RN  Outcome: Progressing  1/15/2023 0525 by Marge Brock RN  Outcome: Progressing  1/15/2023 0523 by Marge Brock RN  Outcome: Progressing     Problem: Infection - Adult  Goal: Absence of infection at discharge  1/15/2023 1154 by Pierre Smith RN  Outcome: Progressing  Flowsheets (Taken 1/15/2023 0845)  Absence of infection at discharge: Assess and monitor for signs and symptoms of infection  1/15/2023 0525 by Marge Brock RN  Outcome: Progressing  1/15/2023 0523 by Marge Brock RN  Outcome: Progressing  Flowsheets (Taken 1/14/2023 2020)  Absence of infection at discharge: Assess and monitor for signs and symptoms of infection  Goal: Absence of infection during hospitalization  1/15/2023 1154 by Leesa Thompson MAKSIM Avila  Outcome: Progressing  Flowsheets (Taken 1/15/2023 0845)  Absence of infection during hospitalization: Assess and monitor for signs and symptoms of infection  1/15/2023 0525 by Brian Begum RN  Outcome: Progressing  1/15/2023 0523 by Brian Begum RN  Outcome: Progressing  Flowsheets (Taken 1/14/2023 2020)  Absence of infection during hospitalization: Assess and monitor for signs and symptoms of infection  Goal: Absence of fever/infection during anticipated neutropenic period  1/15/2023 1154 by Gloria Rios RN  Outcome: Progressing  1/15/2023 0525 by Brian Begum RN  Outcome: Progressing  1/15/2023 0523 by Brian Begum RN  Outcome: Progressing     Problem: Metabolic/Fluid and Electrolytes - Adult  Goal: Electrolytes maintained within normal limits  1/15/2023 1154 by Gloria Rios RN  Outcome: Progressing  Flowsheets (Taken 1/15/2023 0845)  Electrolytes maintained within normal limits: Monitor labs and assess patient for signs and symptoms of electrolyte imbalances  1/15/2023 0525 by Brian Begum RN  Outcome: Progressing  1/15/2023 0523 by Brian Begum RN  Outcome: Progressing  Flowsheets (Taken 1/14/2023 2020)  Electrolytes maintained within normal limits: Monitor labs and assess patient for signs and symptoms of electrolyte imbalances  Goal: Hemodynamic stability and optimal renal function maintained  1/15/2023 1154 by Gloria Rios RN  Outcome: Progressing  Flowsheets (Taken 1/15/2023 0845)  Hemodynamic stability and optimal renal function maintained: Monitor labs and assess for signs and symptoms of volume excess or deficit  1/15/2023 0525 by Brian Begum RN  Outcome: Progressing  1/15/2023 0523 by Brian Begum RN  Outcome: Progressing  Flowsheets (Taken 1/14/2023 2020)  Hemodynamic stability and optimal renal function maintained: Monitor labs and assess for signs and symptoms of volume excess or deficit  Goal: Glucose maintained within prescribed range  1/15/2023 1154 by Zahra Solis RN  Outcome: Progressing  Flowsheets (Taken 1/15/2023 0845)  Glucose maintained within prescribed range: Monitor blood glucose as ordered  1/15/2023 0525 by Jarred Portillo RN  Outcome: Progressing  1/15/2023 0523 by Jarred Portillo RN  Outcome: Progressing  [address] (Taken 1/14/2023 2020)  Glucose maintained within prescribed range: Monitor blood glucose as ordered     Problem: Hematologic - Adult  Goal: Maintains hematologic stability  1/15/2023 1154 by Zahra Solis RN  Outcome: Progressing  Flowsheets (Taken 1/15/2023 0845)  Maintains hematologic stability: Assess for signs and symptoms of bleeding or hemorrhage  1/15/2023 0525 by Jarred Portillo RN  Outcome: Progressing  1/15/2023 0523 by Jarred Portillo RN  Outcome: Progressing  Flowsheets (Taken 1/14/2023 2020)  Maintains hematologic stability: Assess for signs and symptoms of bleeding or hemorrhage     Problem: Nutrition Deficit:  Goal: Optimize nutritional status  1/15/2023 1154 by Zahra Solis RN  Outcome: Progressing  1/15/2023 0525 by Jarred Portillo RN  Outcome: Progressing  1/15/2023 0523 by Jarred Portillo RN  Outcome: Progressing     Problem: ABCDS Injury Assessment  Goal: Absence of physical injury  1/15/2023 1154 by Zahra Solis RN  Outcome: Progressing  Flowsheets (Taken 1/15/2023 1152)  Absence of Physical Injury: Implement safety measures based on patient assessment  1/15/2023 0525 by Jarred Portillo RN  Outcome: Progressing  1/15/2023 0523 by Jarred Portillo RN  Outcome: Progressing     Problem: Skin/Tissue Integrity  Goal: Absence of new skin breakdown  Description: 1. Monitor for areas of redness and/or skin breakdown  2. Assess vascular access sites hourly  3. Every 4-6 hours minimum:  Change oxygen saturation probe site  4.   Every 4-6 hours:  If on nasal continuous positive airway pressure, respiratory therapy assess nares and determine need for appliance change or resting period.   Outcome: Progressing

## 2023-01-15 NOTE — PLAN OF CARE
Problem: Safety - Adult  Goal: Free from fall injury  Outcome: Progressing  Note: Patient free from falls this shift. Fall precautions in place at all times. Bed in lowest position with two side rails up and wheels locked. Call light within reach. Patient able and agreeable to contact for safety appropriately.

## 2023-01-15 NOTE — PROGRESS NOTES
81 y.o. patient admitted to rehab with R CVA. A/Ox4. Transfers with walker 1. Mobility restrictions: none. Pt sometimes impulsive. On select 5 carb diet. Poor appetite. Medications taken whole with thins. Pt on tele. On ASA, Heparin for DVT prophylaxis. Skin: WNL with old scars. Dryness and thick, flaky toenails. Oxygen: RA. LDA: none. Has been continent of bowel and continent of bladder. LBM 1/14 per pt. Chair/bed alarms in use and call light in reach. Will monitor for safety.

## 2023-01-16 LAB
ANION GAP SERPL CALCULATED.3IONS-SCNC: 14 MMOL/L (ref 3–16)
BASOPHILS ABSOLUTE: 0 K/UL (ref 0–0.2)
BASOPHILS RELATIVE PERCENT: 0.2 %
BUN BLDV-MCNC: 17 MG/DL (ref 7–20)
CALCIUM SERPL-MCNC: 9.4 MG/DL (ref 8.3–10.6)
CHLORIDE BLD-SCNC: 98 MMOL/L (ref 99–110)
CO2: 25 MMOL/L (ref 21–32)
CREAT SERPL-MCNC: 1.3 MG/DL (ref 0.8–1.3)
EOSINOPHILS ABSOLUTE: 0.2 K/UL (ref 0–0.6)
EOSINOPHILS RELATIVE PERCENT: 1.8 %
GFR SERPL CREATININE-BSD FRML MDRD: 59 ML/MIN/{1.73_M2}
GLUCOSE BLD-MCNC: 142 MG/DL (ref 70–99)
GLUCOSE BLD-MCNC: 143 MG/DL (ref 70–99)
GLUCOSE BLD-MCNC: 155 MG/DL (ref 70–99)
GLUCOSE BLD-MCNC: 179 MG/DL (ref 70–99)
GLUCOSE BLD-MCNC: 181 MG/DL (ref 70–99)
HCT VFR BLD CALC: 41.1 % (ref 40.5–52.5)
HEMOGLOBIN: 13.6 G/DL (ref 13.5–17.5)
LYMPHOCYTES ABSOLUTE: 2 K/UL (ref 1–5.1)
LYMPHOCYTES RELATIVE PERCENT: 24.6 %
MCH RBC QN AUTO: 29 PG (ref 26–34)
MCHC RBC AUTO-ENTMCNC: 33 G/DL (ref 31–36)
MCV RBC AUTO: 87.8 FL (ref 80–100)
MONOCYTES ABSOLUTE: 0.6 K/UL (ref 0–1.3)
MONOCYTES RELATIVE PERCENT: 7.3 %
NEUTROPHILS ABSOLUTE: 5.5 K/UL (ref 1.7–7.7)
NEUTROPHILS RELATIVE PERCENT: 66.1 %
PDW BLD-RTO: 13.6 % (ref 12.4–15.4)
PERFORMED ON: ABNORMAL
PLATELET # BLD: 272 K/UL (ref 135–450)
PMV BLD AUTO: 7.1 FL (ref 5–10.5)
POTASSIUM REFLEX MAGNESIUM: 4.3 MMOL/L (ref 3.5–5.1)
RBC # BLD: 4.68 M/UL (ref 4.2–5.9)
SODIUM BLD-SCNC: 137 MMOL/L (ref 136–145)
WBC # BLD: 8.3 K/UL (ref 4–11)

## 2023-01-16 PROCEDURE — 6370000000 HC RX 637 (ALT 250 FOR IP): Performed by: PHYSICAL MEDICINE & REHABILITATION

## 2023-01-16 PROCEDURE — 1280000000 HC REHAB R&B

## 2023-01-16 PROCEDURE — 97116 GAIT TRAINING THERAPY: CPT | Performed by: PHYSICAL THERAPIST

## 2023-01-16 PROCEDURE — 97112 NEUROMUSCULAR REEDUCATION: CPT

## 2023-01-16 PROCEDURE — 80048 BASIC METABOLIC PNL TOTAL CA: CPT

## 2023-01-16 PROCEDURE — 36415 COLL VENOUS BLD VENIPUNCTURE: CPT

## 2023-01-16 PROCEDURE — 97129 THER IVNTJ 1ST 15 MIN: CPT

## 2023-01-16 PROCEDURE — 97530 THERAPEUTIC ACTIVITIES: CPT

## 2023-01-16 PROCEDURE — 97530 THERAPEUTIC ACTIVITIES: CPT | Performed by: PHYSICAL THERAPIST

## 2023-01-16 PROCEDURE — 85025 COMPLETE CBC W/AUTO DIFF WBC: CPT

## 2023-01-16 PROCEDURE — 6360000002 HC RX W HCPCS: Performed by: PHYSICAL MEDICINE & REHABILITATION

## 2023-01-16 PROCEDURE — 97535 SELF CARE MNGMENT TRAINING: CPT

## 2023-01-16 PROCEDURE — 97110 THERAPEUTIC EXERCISES: CPT | Performed by: PHYSICAL THERAPIST

## 2023-01-16 PROCEDURE — 97130 THER IVNTJ EA ADDL 15 MIN: CPT

## 2023-01-16 PROCEDURE — 97110 THERAPEUTIC EXERCISES: CPT

## 2023-01-16 RX ADMIN — METFORMIN HYDROCHLORIDE 1000 MG: 500 TABLET ORAL at 08:34

## 2023-01-16 RX ADMIN — QUETIAPINE FUMARATE 50 MG: 25 TABLET ORAL at 21:11

## 2023-01-16 RX ADMIN — HEPARIN SODIUM 5000 UNITS: 5000 INJECTION INTRAVENOUS; SUBCUTANEOUS at 12:03

## 2023-01-16 RX ADMIN — ATORVASTATIN CALCIUM 80 MG: 80 TABLET, FILM COATED ORAL at 21:11

## 2023-01-16 RX ADMIN — HEPARIN SODIUM 5000 UNITS: 5000 INJECTION INTRAVENOUS; SUBCUTANEOUS at 21:11

## 2023-01-16 RX ADMIN — ASPIRIN 81 MG: 81 TABLET, COATED ORAL at 08:34

## 2023-01-16 ASSESSMENT — PAIN SCALES - GENERAL
PAINLEVEL_OUTOF10: 0
PAINLEVEL_OUTOF10: 0

## 2023-01-16 NOTE — PROGRESS NOTES
Physical Therapy  Facility/Department: [facility]  Rehabilitation Physical Therapy Treatment Note    NAME: Kalyan Florence  : 1950 (81 y.o.)  MRN: [id number]  CODE STATUS: Full Code    Date of Service: 23       Restrictions:  Restrictions/Precautions: Fall Risk  Position Activity Restriction  Other position/activity restrictions: reg diet     SUBJECTIVE  Subjective  Subjective: Pt reports not much dizziness this AM.  Pain: no complaints of pain at this time. OBJECTIVE  Cognition  Overall Cognitive Status: Exceptions  Memory: Decreased short term memory  Insights: Decreased awareness of deficits  Cognition Comment: pt is cooperative, mild word finding & mild slow processing  Orientation  Overall Orientation Status: Within Normal Limits    Functional Mobility  Bed Mobility  Overall Assistance Level: Independent  Bridging  Assistance Level: Independent  Roll Left  Assistance Level: Independent  Roll Right  Assistance Level: Independent  Sit to Supine  Assistance Level: Independent  Supine to Sit  Assistance Level: Independent  Scooting  Assistance Level: Independent  Balance  Sitting Balance: Independent  Standing Balance: Stand by assistance (with and without UE support)  Transfers  Surface: From bed;From chair with arms; To chair with arms  Device: Walker  Sit to Stand  Assistance Level: Stand by assist  Stand to Sit  Assistance Level: Stand by assist  Bed To/From Chair  Technique: Stand pivot  Assistance Level: Stand by assist  Skilled Clinical Factors: with and without wheeled walker. Car Transfer  Assistance Level: Stand by assist      Environmental Mobility  Ambulation  Surface: Level surface; Carpet  Device: Rolling walker  Distance: 500+' with multiple turns  Activity: Within Unit  Activity Comments: patient demonstrating difficulty with left visual field  Additional Factors: Verbal cues; Increased time to complete  Assistance Level: Stand by assist  Gait Deviations: Slow karol; Path deviations  Skilled Clinical Factors: Patient also ambulated short distances in therapy gym   Stairs  Stair Height: 6''  Device: Bilateral handrails  Number of Stairs: 12  Additional Factors: Verbal cues; Increased time to complete  Assistance Level: Stand by assist  Curb  Curb Height: 6''  Device: One handrail  Number of Curbs: 1  Additional Factors: Verbal cues; Increased time to complete             PT Exercises  Dynamic Standing Balance Exercises: Placed 10 post it notes on the walls in an 150' section of the Rehab murry. They were place in random order and at various heights. The pt was cued to scan R/L and up/down to find each one in order and the key is to remember where the numbers are located. The pt located all 10 in 20 minutes without a break. He had difficulty finding #5 since it was low and 6. He needed cues for 8, 9 and 10 as well. He was easily distracted but had no LOB but did have slight post movement upon stopping. Worked on dynamic standing balance activities with dbouncing ball back and forth between hands, bouncing ball on the ground and catching it and tossing ball back and forth. Less LOB when nudged today. PM session:  Environmental Mobility  Ambulation  Surface: Level surface; Carpet  Device: crutch used as a cane since none of our canes were tall enough  Distance: 400' x2 with 2 turns  Activity: Within Unit  Activity Comments: patient demonstrating difficulty with left visual field  Additional Factors: Verbal cues; Increased time to complete  Assistance Level: Stand by assist  Gait Deviations: Slow karol; Path deviations    Exercises:  The pt was able to tolerate 10 minutes of BLE strengthening, conditioning and reciprocal movement on the NuStep with Seat and UEs at 15, workload 5    ASSESSMENT/PROGRESS TOWARDS GOALS    Assessment  Assessment: MrAundrea \"Murphy\" Suzy Chisholm is tolerating high level balance activities well and ranges from CGA-SBA.  He does have some minor LOB, but he can recover with CGA. He does have diffiuclty attending to left side of environment at times and took 20 minutes to find 10 random post it notes on the wall in order ambulating with wheeled walker. He demonstrated good endurance but does fatigue with high level balance activities. Patient is below baseline and will benefit from continued skilled therapy for strengthening, balance training, and neuro re-education. Activity Tolerance: Patient tolerated treatment well  Discharge Recommendations: Continue to assess pending progress; Patient would benefit from continued therapy after discharge  PT Equipment Recommendations  Other: Will continue to assess    Goals  Patient Goals   Patient Goals : I want to go back to work  Short Term Goals  Time Frame for Short Term Goals: 1 week  Short Term Goal 1: I bed mobility-met  Short Term Goal 2: Mod I for all functional transfers  Short Term Goal 3: Amb 200' with Mod I and LRAD  Short Term Goal 4: Up/down curb with mod I  Short Term Goal 5: Up/down 12 steps with mod I    PLAN OF CARE/SAFETY  Physcial Therapy Plan  General Plan:  minutes of therapy at least 5 out of 7 days a week  Days Per Week: 5 Days  Hours Per Day: 1.5 hours  Therapy Duration: 4-7 Days  Current Treatment Recommendations: Strengthening;Balance training;Functional mobility training;Transfer training; Endurance training;Gait training;Stair training;Neuromuscular re-education; Return to work related activity;Home exercise program;Safety education & training;Patient/Caregiver education & training;Equipment evaluation, education, & procurement; Therapeutic activities  Safety Devices  Type of Devices: All fall risk precautions in place;Gait belt;Left in chair;Patient at risk for falls    EDUCATION  Education  Education Given To: Patient  Education Provided: Plan of Care;Transfer Training;Mobility Training;Equipment; Safety  Education Provided Comments: importance of attending to left side of environment when moving, LOS, pt asking if glasses would help his visual field cut- educated that is was a brain problem not an eye problem and that glasses wouldn't help, neither would meds. His brain needs time to heal if it will improve.   Education Method: Verbal;Demonstration  Barriers to Learning: None  Education Outcome: Verbalized understanding    Therapy Time   Individual Concurrent Group Co-treatment   Time In 0900         Time Out 0945         Minutes 45           Timed Code Treatment Minutes: 45 Minutes     Second Session Therapy Time     Individual Co-treatment   Time In 2817     Time Out [facility], PT, 01/16/23 at 10:23 AM

## 2023-01-16 NOTE — PROGRESS NOTES
Patient admitted to rehab with acute ischemic stroke. A/Ox4. Transfers with no device. Mobility restrictions: WBAT. On regular 5 carb  diet, tolerating well. Medications taken whole. On heparin for DVT prophylaxis. Skin: intact. Oxygen: room air. LDA: none. Has been continent of bowel and of bladder. LBM 1/14. Chair/bed alarms in use and call light in reach. Will monitor for safety.  Electronically signed by Yessenia Ellis RN, [address] [place] [address] on 1/16/23 at 10:31 AM EST

## 2023-01-16 NOTE — PROGRESS NOTES
Department of Physical Medicine & Rehabilitation  Progress Note    Patient Identification:  Andrew Felty  [id number]  : 1950  Admit date: 2023    Chief Complaint: Acute ischemic right PCA stroke ([address]    Subjective:   No acute events overnight. Patient seen this am resting in bed. Reports fatigue from poor sleep overnight. Seroquel somewhat helpful. Left peripheral vision deficit overall stable. Labs reviewed. ROS: No f/c, n/v, cp     Objective:  Patient Vitals for the past 24 hrs:   BP Temp Temp src Pulse Resp SpO2 Weight   23 0742 108/68 98.3 °F (36.8 °C) Oral 65 16 99 % --   23 0422 124/78 98.3 °F (36.8 °C) Oral 61 16 97 % 164 lb 0.4 oz (74.4 kg)   23 0100 112/69 98.3 °F (36.8 °C) Oral 55 16 95 % --   01/15/23 1913 -- -- -- 56 -- -- --   01/15/23 1540 102/61 98.5 °F (36.9 °C) Oral 59 16 98 % --     Const: Alert. No distress, pleasant. HEENT: Normocephalic, atraumatic. Normal sclera/conjunctiva. MMM. CV: Regular rhythm, rate ~60  Resp: No respiratory distress. Lungs CTAB. Abd: Soft, nontender, nondistended, NABS+   Ext: No edema. Neuro: Alert, oriented, appropriately interactive. Left VF cut. Psych: Cooperative, appropriate mood and affect    Laboratory data: Available via EMR.    Last 24 hour lab  Recent Results (from the past 24 hour(s))   POCT Glucose    Collection Time: 01/15/23  4:31 PM   Result Value Ref Range    POC Glucose 168 (H) 70 - 99 mg/dl    Performed on ACCU-CHEK    POCT Glucose    Collection Time: 01/15/23  4:56 PM   Result Value Ref Range    POC Glucose 152 (H) 70 - 99 mg/dl    Performed on ACCU-CHEK    POCT Glucose    Collection Time: 01/15/23  8:54 PM   Result Value Ref Range    POC Glucose 139 (H) 70 - 99 mg/dl    Performed on ACCU-CHEK    CBC with Auto Differential    Collection Time: 23  7:27 AM   Result Value Ref Range    WBC 8.3 4.0 - 11.0 K/uL    RBC 4.68 4.20 - 5.90 M/uL    Hemoglobin 13.6 13.5 - 17.5 g/dL    Hematocrit 41.1 40.5 - 52.5 %    MCV 87.8 80.0 - 100.0 fL    MCH 29.0 26.0 - 34.0 pg    MCHC 33.0 31.0 - 36.0 g/dL    RDW 13.6 12.4 - 15.4 %    Platelets 443 550 - 278 K/uL    MPV 7.1 5.0 - 10.5 fL    Neutrophils % 66.1 %    Lymphocytes % 24.6 %    Monocytes % 7.3 %    Eosinophils % 1.8 %    Basophils % 0.2 %    Neutrophils Absolute 5.5 1.7 - 7.7 K/uL    Lymphocytes Absolute 2.0 1.0 - 5.1 K/uL    Monocytes Absolute 0.6 0.0 - 1.3 K/uL    Eosinophils Absolute 0.2 0.0 - 0.6 K/uL    Basophils Absolute 0.0 0.0 - 0.2 K/uL   Basic Metabolic Panel w/ Reflex to MG    Collection Time: 01/16/23  7:27 AM   Result Value Ref Range    Sodium 137 136 - 145 mmol/L    Potassium reflex Magnesium 4.3 3.5 - 5.1 mmol/L    Chloride 98 (L) 99 - 110 mmol/L    CO2 25 21 - 32 mmol/L    Anion Gap 14 3 - 16    Glucose 143 (H) 70 - 99 mg/dL    BUN 17 7 - 20 mg/dL    Creatinine 1.3 0.8 - 1.3 mg/dL    Est, Glom Filt Rate 59 (A) >60    Calcium 9.4 8.3 - 10.6 mg/dL   POCT Glucose    Collection Time: 01/16/23  7:52 AM   Result Value Ref Range    POC Glucose 142 (H) 70 - 99 mg/dl    Performed on ACCU-CHEK        Therapy progress:  Physical therapy:  Bed Mobility:  Overall Assistance Level: Independent  Sit>supine:  Assistance Level: Independent  Supine>sit:  Assistance Level: Independent  Transfers:  Surface: From bed, From chair with arms, To chair with arms  Device: Walker  Sit>stand:  Assistance Level: Stand by assist  Stand>sit:  Assistance Level: Stand by assist  Bed<>chair  Technique: Stand pivot  Assistance Level: Stand by assist  Skilled Clinical Factors: with and without wheeled walker.   Stand Pivot:     Lateral transfer:     Car transfer:  Assistance Level: Stand by assist  Ambulation:  Surface: Level surface, Carpet  Device: Rolling walker  Distance: 500+' with multiple turns  Activity: Within Unit  Activity Comments: patient demonstrating difficulty with left visual field  Additional Factors: Verbal cues, Increased time to complete  Assistance Level: Stand by assist  Gait Deviations: Slow karol, Path deviations  Skilled Clinical Factors: Patient also ambulated short distances in therapy gym and back to patient room without a device with SBA. Stairs:  Stair Height: 6''  Device: Bilateral handrails  Number of Stairs: 12  Additional Factors: Verbal cues, Increased time to complete  Assistance Level: Stand by assist  Curb:  Curb Height: 6''  Device: One handrail  Number of Curbs: 1  Additional Factors: Verbal cues, Increased time to complete  Wheelchair:     Assessment:  Assessment: Mr. \"Murphy\" Shantell Phillips is tolerating high level balance activities well and ranges from CGA-SBA. He does have some minor LOB, but he can recover with CGA. He does have diffiuclty attending to left side of environment at times and took 20 minutes to find 10 random post it notes on the wall in order ambulating with wheeled walker. He demonstrated good endurance but does fatigue with high level balance activities. Patient is below baseline and will benefit from continued skilled therapy for strengthening, balance training, and neuro re-education. Activity Tolerance: Patient tolerated treatment well  Discharge Recommendations: Continue to assess pending progress, Patient would benefit from continued therapy after discharge      Occupational therapy:   Feeding  Assistance Level: Independent  Grooming/Oral Hygiene  Assistance Level: Stand by assist  Skilled Clinical Factors: stood to wash hands with SBA  UE Bathing     LE Bathing     UE Dressing     LE Dressing     Putting On/Taking Off Footwear  Assistance Level: Independent  Skilled Clinical Factors: crossed LE's to don shoes  Toileting  Assistance Level: Stand by assist  Assessment:  Assessment: Patient tolerated session well. Completed supine<>sit in hospital bed wih HOB flat. SBA/CGA for sit<>stand to/from bed, chair with arms and recliner chair. Functional mobility with and without RW with SBA/CGA, no overt LOB noted.  Stood for ~6 minutes to make toast with SBA. Patient is making good progress towards goals. Cont with POC. Activity Tolerance: Patient tolerated treatment well  Discharge Recommendations: Continue to assess pending progress    Speech therapy:    Speech Therapy Diagnosis  Cognitive Diagnosis: Pt demonstrated functional temporal orientation; attention; working memory and STM ;and concrete VPS/PS and reasoning on testing. Pt wtih deficits on testing performance for complex reasoning . Further assessment of executive funciton unless otherwise notified  Speech Diagnosis: Audible and intelligible connected speech. Communication Diagnosis: [place] language skills appear intact. Pt wtih breakdonw with complex processing (ie extra time or assist). This may be baseline. Pt was functional in verbal expression of needs/wants; CFN; concept explanations/event explanations. PT  Position Activity Restriction  Other position/activity restrictions: reg diet  Objective     Sit to Stand: Contact guard assistance  Stand to Sit: Contact guard assistance  Bed to Chair: Contact guard assistance  Device: Rolling Walker, No Device  Assistance: Contact guard assistance  Distance: 50', 150', 200'. 200' x2 w/o AD  OT  PT Equipment Recommendations  Other: Will continue to assess  Equipment Used: Grab bars  Toilet Transfers Comments: close SB/CBA using GB, mild posterior lean & to L  Assessment        SLP          Body mass index is 18.02 kg/m².     Rehabilitation Diagnosis:   Stroke, 1.1, Left Body (R Brain)        Assessment and Plan:     Impairments:left hemiparesis, left VF deficit, decreased balance, cognition     Acute ischemic right PCA stroke with hemorrhagic transformation  -Localization: P2 occlusion affecting R temporal and occipital lobes, thalamus  -Etiology: concern for embolic source  -Secondary ppx: ASA, statin  -Telemetry while inpatient, then cardiac event monitor  -PT/OT/SLP     DM2 with hyperglycemia  -Hgb A1C 10  -metformin, ISS -- monitor trend     dCHF  -Daily wt  -BP and HR unable to tolerate bb, acei     Sinus bradycardia  -Asymptomatic? Patient does report intermittent dizziness over the past 2-3 years. Will see if this coincides with bradycardia on monitor.    -Telemetry     SHELLIE vs CKD  -Cr 1.3-1.5 at [facility]  -Avoid nephrotoxins, renally dose meds  -Monitor renal function     Right frontal convexity Meningioma  -Incidental finding  -Plan for f/u with Dr. Jeanne Klein as outpatient     Bladder   -High risk retention   -Monitor PVRs, SC prn >300cc     Bowel   -High risk constipation   -senna+colace BID, PRN miralax, MoM, and bisacodyl supp. Safety   -fall precautions     Pain control  -acetaminophen prn     DVT ppx  -heparin    Rehab Progress: Making progress. Working on balance, functional mobility, compensatory strategies, cognition. Limited mostly by left VF cut. Anticipated Dispo: home to [facility]: TBD  DME: TBD  ELOS: 5-7 days      [address] Sh[address] Heard MD 1/16/2023, 11:51 AM

## 2023-01-16 NOTE — PLAN OF CARE
Problem: Discharge Planning  Goal: Discharge to home or other facility with appropriate resources  1/15/2023 2321 by Wolf Donohue RN  Outcome: Progressing  1/15/2023 2320 by Wolf Donohue RN  Outcome: Progressing  Flowsheets (Taken 1/15/2023 1913)  Discharge to home or other facility with appropriate resources: Identify barriers to discharge with patient and caregiver  1/15/2023 1154 by Connor Cardenas RN  Outcome: Progressing  Flowsheets (Taken 1/15/2023 0845)  Discharge to home or other facility with appropriate resources: Identify barriers to discharge with patient and caregiver     Problem: Safety - Adult  Goal: Free from fall injury  1/15/2023 2321 by Wolf Donohue RN  Outcome: Progressing  1/15/2023 2320 by Wolf Donohue RN  Outcome: Progressing  [address] (Taken 1/15/2023 2143)  Free From Fall Injury: Instruct family/caregiver on patient safety  1/15/2023 1154 by Connor Cardenas RN  Outcome: Progressing  Flowsheets (Taken 1/15/2023 1152)  Free From Fall Injury: Instruct family/caregiver on patient safety     Problem: Pain  Goal: Verbalizes/displays adequate comfort level or baseline comfort level  1/15/2023 2321 by Wolf Donohue RN  Outcome: Progressing  1/15/2023 2320 by Wolf Donohue RN  Outcome: Progressing  Flowsheets (Taken 1/15/2023 1900)  Verbalizes/displays adequate comfort level or baseline comfort level: Encourage patient to monitor pain and request assistance  1/15/2023 1154 by Connor Cardenas RN  Outcome: Progressing     Problem: Neurosensory - Adult  Goal: Achieves stable or improved neurological status  1/15/2023 2321 by Wolf Donohue RN  Outcome: Progressing  1/15/2023 2320 by Wolf Donohue RN  Outcome: Progressing  Flowsheets (Taken 1/15/2023 1913)  Achieves stable or improved neurological status: Assess for and report changes in neurological status  1/15/2023 1154 by Connor Cardenas RN  Outcome: Progressing  Flowsheets (Taken 1/15/2023 0845)  Achieves stable or improved neurological status: Assess for and report changes in neurological status  Goal: Absence of seizures  1/15/2023 2321 by Li Erickson RN  Outcome: Progressing  1/15/2023 2320 by Li Erickson RN  Outcome: Progressing  Flowsheets (Taken 1/15/2023 1913)  Absence of seizures: If seizure occurs, turn head to side and suction secretions as needed  1/15/2023 1154 by Rochelle Johnson RN  Outcome: Progressing  Flowsheets (Taken 1/15/2023 0845)  Absence of seizures: Monitor for seizure activity.   If seizure occurs, document type and location of movements and any associated apnea  Goal: Remains free of injury related to seizures activity  1/15/2023 2321 by Li Erickson RN  Outcome: Progressing  1/15/2023 2320 by Li Erickson RN  Outcome: Progressing  Flowsheets (Taken 1/15/2023 1913)  Remains free of injury related to seizure activity: If seizure occurs, turn patient to side and suction secretions as needed  1/15/2023 1154 by Rochelle Johnson RN  Outcome: Progressing  Flowsheets (Taken 1/15/2023 0845)  Remains free of injury related to seizure activity: If seizure occurs, turn patient to side and suction secretions as needed  Goal: Achieves maximal functionality and self care  1/15/2023 2321 by Li Erickson RN  Outcome: Progressing  1/15/2023 2320 by Li Erickson RN  Outcome: Progressing  Flowsheets (Taken 1/15/2023 1913)  Achieves maximal functionality and self care: Encourage and assist patient to increase activity and self care with guidance from physical therapy/occupational therapy  1/15/2023 1154 by Rochelle Johnson RN  Outcome: Progressing  Flowsheets (Taken 1/15/2023 0845)  Achieves maximal functionality and self care: Encourage and assist patient to increase activity and self care with guidance from physical therapy/occupational therapy     Problem: Respiratory - Adult  Goal: Achieves optimal ventilation and oxygenation  1/15/2023 2321 by Li Erickson RN  Outcome: Progressing  1/15/2023 2320 by Hugo West MAKSIM Julian  Outcome: Progressing  Flowsheets (Taken 1/15/2023 1913)  Achieves optimal ventilation and oxygenation: Assess for changes in respiratory status  1/15/2023 1154 by Gloria Rios RN  Outcome: Progressing  Flowsheets (Taken 1/15/2023 0845)  Achieves optimal ventilation and oxygenation: Assess for changes in respiratory status     Problem: Cardiovascular - Adult  Goal: Maintains optimal cardiac output and hemodynamic stability  1/15/2023 2321 by Jessica Marshall RN  Outcome: Progressing  1/15/2023 2320 by Jessica Marshall RN  Outcome: Progressing  Flowsheets (Taken 1/15/2023 1913)  Maintains optimal cardiac output and hemodynamic stability: Monitor blood pressure and heart rate  1/15/2023 1154 by Gloria Rios RN  Outcome: Progressing  Flowsheets (Taken 1/15/2023 0845)  Maintains optimal cardiac output and hemodynamic stability: Monitor blood pressure and heart rate  Goal: Absence of cardiac dysrhythmias or at baseline  1/15/2023 2321 by Jessica Marshall RN  Outcome: Progressing  1/15/2023 2320 by Jessica Marshall RN  Outcome: Progressing  Flowsheets (Taken 1/15/2023 1913)  Absence of cardiac dysrhythmias or at baseline: Monitor cardiac rate and rhythm  1/15/2023 1154 by Gloria Rios RN  Outcome: Progressing  Flowsheets (Taken 1/15/2023 0845)  Absence of cardiac dysrhythmias or at baseline: Monitor cardiac rate and rhythm     Problem: Skin/Tissue Integrity - Adult  Goal: Skin integrity remains intact  1/15/2023 2321 by Jessica Marshall RN  Outcome: Progressing  1/15/2023 2320 by Jessica Marshall RN  Outcome: Progressing  Flowsheets  Taken 1/15/2023 2159  Skin Integrity Remains Intact: Monitor for areas of redness and/or skin breakdown  Taken 1/15/2023 2143  Skin Integrity Remains Intact: Monitor for areas of redness and/or skin breakdown  Taken 1/15/2023 1913  Skin Integrity Remains Intact: Monitor for areas of redness and/or skin breakdown  1/15/2023 1154 by Gloria Rios RN  Outcome: Progressing  Flowsheets  Taken 1/15/2023 1152  Skin Integrity Remains Intact: Monitor for areas of redness and/or skin breakdown  Taken 1/15/2023 0845  Skin Integrity Remains Intact: Monitor for areas of redness and/or skin breakdown  Goal: Incisions, wounds, or drain sites healing without S/S of infection  1/15/2023 2321 by Annmarie Garcia RN  Outcome: Progressing  1/15/2023 2320 by Annmarie Garcia RN  Outcome: Progressing  Flowsheets  Taken 1/15/2023 2159  Incisions, Wounds, or Drain Sites Healing Without Sign and Symptoms of Infection: TWICE DAILY: Assess and document skin integrity  Taken 1/15/2023 2143  Incisions, Wounds, or Drain Sites Healing Without Sign and Symptoms of Infection: TWICE DAILY: Assess and document skin integrity  Taken 1/15/2023 1913  Incisions, Wounds, or Drain Sites Healing Without Sign and Symptoms of Infection: TWICE DAILY: Assess and document skin integrity  1/15/2023 1154 by Srinivasa Henley RN  Outcome: Progressing  Flowsheets  Taken 1/15/2023 1152  Incisions, Wounds, or Drain Sites Healing Without Sign and Symptoms of Infection: ADMISSION and DAILY: Assess and document risk factors for pressure ulcer development  Taken 1/15/2023 0845  Incisions, Wounds, or Drain Sites Healing Without Sign and Symptoms of Infection: ADMISSION and DAILY: Assess and document risk factors for pressure ulcer development  Goal: Oral mucous membranes remain intact  1/15/2023 2321 by Annmarie Garcia RN  Outcome: Progressing  1/15/2023 2320 by Annmarie aGrcia RN  Outcome: Progressing  Flowsheets  Taken 1/15/2023 2159  Oral Mucous Membranes Remain Intact: Assess oral mucosa and hygiene practices  Taken 1/15/2023 2143  Oral Mucous Membranes Remain Intact: Assess oral mucosa and hygiene practices  Taken 1/15/2023 1913  Oral Mucous Membranes Remain Intact: Assess oral mucosa and hygiene practices  1/15/2023 1154 by Srinivasa Henley RN  Outcome: Progressing  Flowsheets  Taken 1/15/2023 1152  Oral Mucous Membranes Remain Intact: Assess oral mucosa and hygiene practices  Taken 1/15/2023 0845  Oral Mucous Membranes Remain Intact: Assess oral mucosa and hygiene practices     Problem: Musculoskeletal - Adult  Goal: Return mobility to safest level of function  1/15/2023 2321 by Li Erickson RN  Outcome: Progressing  1/15/2023 2320 by Li Erickson RN  Outcome: Progressing  Flowsheets (Taken 1/15/2023 1913)  Return Mobility to Safest Level of Function: Assess patient stability and activity tolerance for standing, transferring and ambulating with or without assistive devices  1/15/2023 1154 by Rochelle Johnson RN  Outcome: Progressing  Flowsheets (Taken 1/15/2023 0845)  Return Mobility to Safest Level of Function:   Assess patient stability and activity tolerance for standing, transferring and ambulating with or without assistive devices   Assist with transfers and ambulation using safe patient handling equipment as needed  Goal: Maintain proper alignment of affected body part  1/15/2023 2321 by Li Erickson RN  Outcome: Progressing  1/15/2023 2320 by Li Erickson RN  Outcome: Progressing  1/15/2023 1154 by Rochelle Johnson RN  Outcome: Progressing  Goal: Return ADL status to a safe level of function  1/15/2023 2321 by Li Erickson RN  Outcome: Progressing  1/15/2023 2320 by Li Erickson RN  Outcome: Progressing  Flowsheets (Taken 1/15/2023 1913)  Return ADL Status to a Safe Level of Function: Assess activities of daily living deficits and provide assistive devices as needed  1/15/2023 1154 by Rochelle Johnson RN  Outcome: Progressing  Flowsheets (Taken 1/15/2023 0845)  Return ADL Status to a Safe Level of Function: Administer medication as ordered     Problem: Gastrointestinal - Adult  Goal: Minimal or absence of nausea and vomiting  1/15/2023 2321 by Li Erickson RN  Outcome: Progressing  1/15/2023 2320 by Li Erickson RN  Outcome: Progressing  Flowsheets (Taken 1/15/2023 1913)  Minimal or absence of nausea and vomiting: Provide nonpharmacologic comfort measures as appropriate  1/15/2023 1154 by Dhruv Farr RN  Outcome: Progressing  Flowsheets (Taken 1/15/2023 0845)  Minimal or absence of nausea and vomiting: Provide nonpharmacologic comfort measures as appropriate  Goal: Maintains or returns to baseline bowel function  1/15/2023 2321 by Mary Beth Maria RN  Outcome: Progressing  1/15/2023 2320 by Mary Beth Maria RN  Outcome: Progressing  Flowsheets (Taken 1/15/2023 1913)  Maintains or returns to baseline bowel function: Assess bowel function  1/15/2023 1154 by Dhruv Farr RN  Outcome: Progressing  [address] (Taken 1/15/2023 0845)  Maintains or returns to baseline bowel function:   Assess bowel function   Encourage oral fluids to ensure adequate hydration   Administer ordered medications as needed  Goal: Maintains adequate nutritional intake  1/15/2023 2321 by Mary Beth Maria RN  Outcome: Progressing  1/15/2023 2320 by Mary Beth Maria RN  Outcome: Progressing  Flowsheets (Taken 1/15/2023 1913)  Maintains adequate nutritional intake: Monitor percentage of each meal consumed  1/15/2023 1154 by Dhruv Farr RN  Outcome: Progressing  Flowsheets (Taken 1/15/2023 0845)  Maintains adequate nutritional intake:   Monitor percentage of each meal consumed   Obtain nutritional consult as needed  Problem: Genitourinary - Adult  Goal: Absence of urinary retention  1/15/2023 2321 by Mary Beth Maria RN  Outcome: Progressing  1/15/2023 2320 by Mary Beth Maria RN  Outcome: Progressing  Flowsheets (Taken 1/15/2023 1913)  Absence of urinary retention: Assess patients ability to void and empty bladder  1/15/2023 1154 by Dhruv Farr RN  Outcome: Progressing  Flowsheets (Taken 1/15/2023 0845)  Absence of urinary retention: Assess patients ability to void and empty bladder  Problem: Infection - Adult  Goal: Absence of infection at discharge  1/15/2023 2321 by Mary Beth Maria RN  Outcome: Progressing  1/15/2023 2320 by Mary Beth Maria RN  Outcome: Progressing  Flowsheets  Taken 1/15/2023 2159  Absence of infection at discharge: Instruct and encourage patient and family to use good hand hygiene technique  Taken 1/15/2023 1913  Absence of infection at discharge: Assess and monitor for signs and symptoms of infection  1/15/2023 1154 by Annmarie Khan RN  Outcome: Progressing  Flowsheets (Taken 1/15/2023 0845)  Absence of infection at discharge: Assess and monitor for signs and symptoms of infection  Goal: Absence of infection during hospitalization  1/15/2023 2321 by Silvestre Reeves RN  Outcome: Progressing  1/15/2023 2320 by Silvestre Reeves RN  Outcome: Progressing  Flowsheets  Taken 1/15/2023 2159  Absence of infection during hospitalization: Instruct and encourage patient and family to use good hand hygiene technique  Taken 1/15/2023 1913  Absence of infection during hospitalization: Assess and monitor for signs and symptoms of infection  1/15/2023 1154 by Annmarie Khan RN  Outcome: Progressing  Flowsheets (Taken 1/15/2023 0845)  Absence of infection during hospitalization: Assess and monitor for signs and symptoms of infection  Goal: Absence of fever/infection during anticipated neutropenic period  1/15/2023 2321 by Silvestre Reeves RN  Outcome: Progressing  1/15/2023 2320 by Silvestre Reeves RN  Outcome: Progressing  1/15/2023 1154 by Annmarie Khan RN  Outcome: Progressing     Problem: Metabolic/Fluid and Electrolytes - Adult  Goal: Electrolytes maintained within normal limits  1/15/2023 2321 by Silvestre Reeves RN  Outcome: Progressing  1/15/2023 2320 by Silvestre Reeves RN  Outcome: Progressing  Flowsheets (Taken 1/15/2023 1913)  Electrolytes maintained within normal limits: Monitor labs and assess patient for signs and symptoms of electrolyte imbalances  1/15/2023 1154 by Annmarie Khan RN  Outcome: Progressing  Flowsheets (Taken 1/15/2023 0845)  Electrolytes maintained within normal limits: Monitor labs and assess patient for signs and symptoms of electrolyte imbalances  Goal: Hemodynamic stability and optimal renal function maintained  1/15/2023 2321 by Ruy Uribe RN  Outcome: Progressing  1/15/2023 2320 by Ryu Uribe RN  Outcome: Progressing  1/15/2023 1154 by Jessi Rodriguez RN  Outcome: Progressing  Flowsheets (Taken 1/15/2023 0845)  Hemodynamic stability and optimal renal function maintained: Monitor labs and assess for signs and symptoms of volume excess or deficit  Goal: Glucose maintained within prescribed range  1/15/2023 2321 by Ruy Uribe RN  Outcome: Progressing  1/15/2023 2320 by Ruy Uribe RN  Outcome: Progressing  Flowsheets (Taken 1/15/2023 1913)  Glucose maintained within prescribed range: Monitor blood glucose as ordered  1/15/2023 1154 by Jessi Rodriguez RN  Outcome: Progressing  Flowsheets (Taken 1/15/2023 0845)  Glucose maintained within prescribed range: Monitor blood glucose as ordered     Problem: Hematologic - Adult  Goal: Maintains hematologic stability  1/15/2023 2321 by Ruy Uribe RN  Outcome: Progressing  1/15/2023 2320 by Ruy Uribe RN  Outcome: Progressing  Flowsheets (Taken 1/15/2023 1913)  Maintains hematologic stability: Assess for signs and symptoms of bleeding or hemorrhage  1/15/2023 1154 by Jessi Rodriguez RN  Outcome: Progressing  Flowsheets (Taken 1/15/2023 0845)  Maintains hematologic stability: Assess for signs and symptoms of bleeding or hemorrhage     Problem: Nutrition Deficit:  Goal: Optimize nutritional status  1/15/2023 2321 by Ruy Uribe RN  Outcome: Progressing  1/15/2023 2320 by Ruy Uribe RN  Outcome: Progressing  1/15/2023 1154 by Jessi Rodriguez RN  Outcome: Progressing     Problem: ABCDS Injury Assessment  Goal: Absence of physical injury  1/15/2023 2321 by Ruy Uribe RN  Outcome: Progressing  1/15/2023 2320 by Ruy Uribe RN  Outcome: Progressing  Flowsheets  Taken 1/15/2023 2159  Absence of Physical Injury: Implement safety measures based on patient assessment  Taken 1/15/2023 2143  Absence of Physical Injury: Implement safety measures based on patient assessment  1/15/2023 1154 by Rebecca Diaz RN  Outcome: Progressing  Flowsheets (Taken 1/15/2023 1152)  Absence of Physical Injury: Implement safety measures based on patient assessment     Problem: Skin/Tissue Integrity  Goal: Absence of new skin breakdown  Description: 1. Monitor for areas of redness and/or skin breakdown  2. Assess vascular access sites hourly  3. Every 4-6 hours minimum:  Change oxygen saturation probe site  4. Every 4-6 hours:  If on nasal continuous positive airway pressure, respiratory therapy assess nares and determine need for appliance change or resting period.   1/15/2023 2321 by Pipe Orozco RN  Outcome: Progressing  1/15/2023 2320 by Pipe Orozco RN  Outcome: Progressing  1/15/2023 1154 by Rebecca Diaz RN  Outcome: Progressing

## 2023-01-16 NOTE — PROGRESS NOTES
Speech Language Pathology  ACUTE REHAB UNIT  SPEECH/LANGUAGE PATHOLOGY      [x] Daily  [x] Weekly Care Conference Note  [] Discharge    Patient:Popeye Lopes  GQA:[id number]  Rehab Dx/Hx: Acute ischemic right PCA stroke ([facility] [address] [I63.531]    Precautions: FAll risk; Reports ofvisual problems  Home situation: Has roommates  ST Dx: [] Aphasia  [] Dysarthria  [] Apraxia   [] Oropharyngeal dysphagia [x] Cognitive   Impairment  [] Other:   Initial Speech Therapy Assessment Diagnosis:   Speech Therapy Diagnosis  Cognitive Diagnosis: Pt demonstrated functional temporal orientation; attention; working memory and STM ;and concrete VPS/PS and reasoning on testing. Pt wtih deficits on testing performance for complex reasoning . Further assessment of executive funciton unless otherwise notified  Speech Diagnosis: Audible and intelligible connected speech. Communication Diagnosis: [place] language skills appear intact. Pt wtih breakdonw with complex processing (ie extra time or assist). This may be baseline. Pt was functional in verbal expression of needs/wants; CFN; concept explanations/event explanations. Date of Admit: 1/12/2023  Room #: W2W-6031/0832-56  Date: 1/16/2023       Current functional status (updated daily):         Current Diet Order:ADULT DIET; Regular; 5 carb choices (75 gm/meal)     Behavior: [x] Alert  [x] Cooperative  [x]  Pleasant  [] Confused  [] Agitated  [] Uncooperative  [] Distractible [] Motivated  [] Self-Limiting [] Anxious  [] Other:  Endurance:  [x] Adequate for participation in SLP sessions  [] Reduced overall  [] Lethargic  [] Other:  Safety: [] No concerns at this time  [] Reduced insight into deficits  []  Reduced safety awareness [] Not following call light procedures  [] Unable to Assess  [x] Other:Potential concern for visual deficits and mobility.  Thus far with language tasks; pt spontaneously scanning left    Barriers toward progress: None unless caregiver support or assist           Date: 1/16/2023      Tx session 1 Tx session 2   Total Timed Code Min   SLP Individual Minutes  Time In: 0945  Time Out: 2837  Minutes: 39  Coded treatment time 39  N/a   Group Treatment Minutes 0 0   Co-Treat Minutes 0 0   Variance/Reason:  N/a    Pain denied    Pain Intervention [] RN notified  [] Repositioned  [] Intervention offered and patient declined  [] N/A  [] Other: [] RN notified  [] Repositioned  [] Intervention offered and patient declined  [] N/A  [] Other:   Subjective     Pt seen in treatment office  Good effort     Objective:  Goals     Cognitive-communication goalsGoals: Pt goal is to get back home   Therapy working toward pt goals of IND with cognitive-communicaiton skills for concrete and mild complex ADV DL situations N/A       Goal 1: Pt will partiicpatein ongoing assessment of cognitive-linguistic skills for executive funciton unless otherwise notified   JAKI (norms for >72ears of age)    Reading Analog clocks: 100%; score profile of '1' indicating high probability of IND    Counting money: 90% ; score profile of '1' indicating high probability of IND    Solving Daily Math Problems: 90% ; score profile of '1' indicating high probability of IND    Writing a Check and Balancing Checkbook:Pt achieved a score profile of '2' indicating some level of assist. Pt self reports he does not fill out ledger and the bank does all of this balancing for him    Reading Instructions: : 80%; score profile of '1' indicating high probability of IND    Understanding Med Labels: 100%; score profile of '1' indicating high probability of IND    Using a Calendar: 100%; score profile of '1' indicating high probability of IND    Writing phone Messages: DNT N/A   Other areas targeted:     Education:   Ongoing pt ed; plan to discuss at team meeting 1/17/2023    Safety Devices: [] Call light within reach  [] Chair alarm activated  [] Bed alarm activated  [x] Other: transport returned pt to room [] Call light within reach  [] Chair alarm activated  [] Bed alarm activated  [] Other:    Progress Assessment: 1/16/2023: Will discuss at team meeting   Plan: Continue as per plan of care. Continued Tx Upon Discharge: ?    [] Yes [] No [x] TBD based on progress while on ARU [] Vital Stim indicated [] Other:   Estimated discharge date: TBD   Discharge recommendations:   [] Home independently  [] Home with assistance []  24 hour supervision  [] ECF [] Other:     Additional information:     Interventions used during Rehab Stay:  [] Speech/Language Treatment  [] Instruction in HEP [] Group [] Dysphagia Treatment [x] Cognitive Treatment   [] Other:          Electronically Signed by    Beba Freeman. Flukelsy,MS,CCC,SLP [id number]  Speech and Language Pathologist

## 2023-01-16 NOTE — PLAN OF CARE
Problem: Discharge Planning  Goal: Discharge to home or other facility with appropriate resources  1/16/2023 1027 by Vel Lazar RN  Outcome: Progressing     Problem: Safety - Adult  Goal: Free from fall injury  1/16/2023 1027 by Vel Lazar RN  Outcome: Progressing  Flowsheets (Taken 1/16/2023 1025)  Free From Fall Injury: Alecia Crockett family/caregiver on patient safety     Problem: Pain  Goal: Verbalizes/displays adequate comfort level or baseline comfort level  1/16/2023 1027 by Vel Lazar RN  Outcome: Progressing

## 2023-01-16 NOTE — PROGRESS NOTES
Occupational Therapy  Facility/Department: [facility]  Rehabilitation Occupational Therapy Daily Treatment Note    Date: 23  Patient Name: Dilan Marrufo       Room: O3S-5831/7216-45  MRN: [id number]  Account: [de-identified]   : 1950  (81 y.o.) Gender: male                    Past Medical History:  has a past medical history of A-fib ([facility] CHF (congestive heart failure) ([facility] DM2 (diabetes mellitus, type 2) ([facility].), and Meningioma ([facility].). Past Surgical History:   has no past surgical history on file. Restrictions  Restrictions/Precautions: Fall Risk  Other position/activity restrictions: reg diet    Subjective  Subjective: met in therapy gym via w/c; he states he doesn't have an appetite; discussed drinking Ensure for nourishment  Restrictions/Precautions: Fall Risk             Objective     Cognition  Overall Cognitive Status: Exceptions  Memory: Decreased short term memory  Insights: Decreased awareness of deficits  Cognition Comment: pt is cooperative, mild word finding & mild slow processing  Orientation  Overall Orientation Status: Within Normal Limits         ADL       Instrumental ADL's  Instrumental ADLs: Yes  Meal Prep  Meal Prep Level: Walker  Meal Prep Level of Assistance: Stand by assistance  Meal Preparation: SBA to use RW into kitchen, open fridge & retrieve milk carton; he could not locate it on L side, required cues to look far L and describe the color of carton (white & blue). Cues to locate bowl, cereal & to stand closer w/ RW to avoid reaching outside ABEBA. No LOB but cues for vision issues     Functional Mobility  Device: Rolling walker  Activity: Retrieve items;Transport items  Assistance Level: Stand by assist  Skilled Clinical Factors: Functional mobility with SBA using RW thru gym<>kitchen; also shown how to use reacher to retrieve items off floor & place into basket, no overt LOB noted.   Transfers  Surface: Wheelchair  Device: Walker  Sit to [employer] Level: Stand by assist  Skilled Clinical Factors: close SBA moves swiftly but no LOB; cues to visually locate items on L side which impacts fall risk  Stand to Sit  Assistance Level: Stand by assist  Bed To/From Chair  Assistance Level: Stand by assist  Stand Pivot  Assistance Level: Stand by assist   OT Exercises  Exercise Treatment: 4# free weights with B UE's 15 reps  Postural Correction Exercises: abdominal squeezes, shld shrugs x 20 to support back while standing & during fxl mob  Motor Control/Coordination: black digi flex with B hands 25 reps x2     Assessment  Assessment  Assessment: pt making good gains w/ OT intervention. He is able to use RW thru gym<>kitchen and to  objects from floor using reacher & basket w/ SBA. He needed cues to see items on his far left including grabbing milk carton in fridge. He was able to make himself a bowl of cereal without difficulty, stood @ 5 minutes; took a few bites of it but discussed his poor appetite--he mostly eats 1-2 times a day & eats out including [place], [place], [place] and [place]. Pt wants to start using a cane for fxl mobility as the [facility] not likely to accommodate a RW; he denies need for any DME; will be able to stand for shower. Recommend continued OT services thru EOW to meet higher level of IND  Activity Tolerance: Patient tolerated treatment well  Discharge Recommendations: Outpatient OT  Factors Affecting Discharge: L visual field cut  OT Equipment Recommendations  Other: denies need for TSF or shower chair, wants to try a cane  Safety Devices  Safety Devices in place: Yes  Type of devices: Gait belt    Patient Education  Education  Education Given To: Patient  Education Provided: Transfer Training;Mobility Training;Equipment; Fall Prevention Strategies; Safety; Low Vision Education  Education Provided Comments: discussed use of reacher & basket when using RW, visual scanning due to L sided visual field cut, reducing fall risk by being seated for bathing; reviewed safe hand placement  Education Method: Demonstration;Verbal  Barriers to Learning: None;Vision  Education Outcome: Verbalized understanding;Continued education needed  Skilled Clinical Factors: mild impulsiveness, has visual field cut L side    Plan  Occupational Therapy Plan  Times Per Week: 5-6  Times Per Day: Twice a day  Specific Instructions for Next Treatment: will be a short 5 day stay  Current Treatment Recommendations: Strengthening;Balance training;Functional mobility training;Gait training;Equipment evaluation, education, & procurement;Self-Care / ADL; Patient/Caregiver education & training;Return to work related activity;Home management training    Goals  Patient Goals   Patient goals : \"get back to normal & go back to work\"  Short Term Goals  Time Frame for Short Term Goals: by 5 days pt will complete  Short Term Goal 1: Grooming IND'ly while standing at sink  Short Term Goal 2: UB & LB dressing IND'ly  Short Term Goal 3: toileting IND'ly  Short Term Goal 4: household transfers IND'ly  Short Term Goal 5: standing balance x 20 minutes during IADLs, pt is --will need to cook, clean & do laundry  Long Term Goals  Time Frame for Long Term Goals : by 5 days pt will complete  Long Term Goal 1: increase L  strength x 2 lbs to be IND w/ opening containers, lids  Long Term Goal 2: address vision further using vision disk, eye chart, scaning and tracking w/ 100% accuracy                   Therapy Time   Individual Concurrent Group Co-treatment   Time In 1115         Time Out 1200         Minutes 45         Timed Code Treatment Minutes: [address] [employer] [facility], OTR/l #[id number]

## 2023-01-16 NOTE — FLOWSHEET NOTE
Patient admitted to ARU with R CVA on Thursday night. 1/12/23. A/Ox4. Transfers with walker 1. Mobility restrictions: none. Pt sometimes impatient and wouldn't wait for asst. To come in to turn off bed alarm. Reminded to do so. On select 5 carb diet. Poor appetite. Denies that he is hungry. Dislikes certain items in his tray today. Ate very little lunch and dinner but had a big breakfast and ate two cups of his favorite vanilla ice cream at hs. Blood sugar check at hs is WNL. Medications taken whole with thins. Pt on tele. On ASA, Heparin for DVT prophylaxis. Skin: WNL with old scars. Dryness and thick, flaky toenails. Oxygen: RA. LDA: none. Has been continent of bowel and continent of bladder. LBM 1/14 per pt. Took his routine hs senna plus tonight. Chair/bed alarms in use and call light in reach. Will monitor for safety.

## 2023-01-16 NOTE — CARE COORDINATION
Chart Reviewed. Met with patient to introduce  role, initiate discussion regarding DC planning, inform of weekly Team Conferences to review his progress on Tuesdays. SOCIAL WORK ASSESSMENT      GOAL:   To return home      HOME SITUATION:   Pt lives alone, apartment with three flights of stairs to enter apt. He reports he was totally independent prior to admit with all personal care needs, driving and home making,. His pcp:   Dr Thornton Min:  Armando Benedict in [facility]:       PERSONAL CARE:    totally independent                                                                       DRIVES:  yes                                                                     FINANCES:independent                                                                   MEALS:    independent                             GROCERY SHOPS:independent      DME CURRENTLY AT HOME:  just recently bought a shower chair. CURRENT HOME CARE/SERVICES:  informed him of possible post acute services such as home care or out patient services to continue his progress. He would be agreeable to what MD orders for him. PREFERRED HOME CARE:  TBD      TEAM CONFERENCE DAY:  Tuesdays. Informed him of weekly Team Conferences where Team will review his progress, goals, barriers, DME recommendations and dc date. THis worker will update him weekly and plan for DC needs. LSW informed patient of preferred  time on date of discharge which is between 10 - 12 noon. LSW informed patient of recommendation for PCP visit within 7 days post discharge. TRANSPORTATION:   He reports he has not missed any appts, MD appts or getting what he needs for daily living due to lack of transportation.     Pj Minaya [place]     Case Management   [phone]    1/16/2023  4:41 PM

## 2023-01-16 NOTE — PROGRESS NOTES
Occupational Therapy  OCCUPATIONAL THERAPY  Progress Note   Second Session    Patient Name: Andrew Felty  Medical Record Number: [id number]    Treatment Diagnosis: Decreased functional mobility    General  Chart Reviewed: Yes  Patient assessed for rehabilitation services?: Yes  Additional Pertinent Hx: pt is a 80 y/o male admitted from [facility] due to acute embolic stroke. Now presents w/ dense L homonymous hemianopia and weakness in L hemibody w/ mild ataxia L UE. MRI: large infarct in R PCA territory involving R occipital lobe, inferior medial R temporal lobe & L thalamus w/ evidence of hemorrhagic transformation. PMhx: DM, HTN, smoker, EF 53%  Family / Caregiver Present: No  Referring Practitioner: Dr Amelia Benson  Diagnosis: CVA     Restrictions/Precautions  Restrictions/Precautions: Fall Risk        Position Activity Restriction  Other position/activity restrictions: reg diet    Subjective: Pt denies pain but occasional dizziness during session (his appetite is poor, Dr Amelia Benson & RN aware). He was offered peanut butter & crackers, OT also offered to reheat his lunch    Objective: agreeable for standing balance, visual scanning & IADLs    Assessment: pt used RW from w/c over to elevated table x 20 ft w/ close SBA, mild posterior swaying but no LOB--he was able to stand x 12\" w/ SBA to match cards during visual scanning--he made 5 errors and had difficulty seeing items on L lower quadrant. Pt was able to sweep up small items from floor using [address] dustpan & broom w/ SBA, no LOB but moves swiftly, cues to empty into trashcan. He was able to empty the laundry bin bag w/ close SB/CGA, tied it up & carried down hallway to the soiled utility room--some dizziness reporting and swaying noted--provided CGA for balance when not using AD (suggested he carry items in both hands & hold close to his chest to reduce unbalanced load). He ambulated short distance from w/c to bed w/ SBA, is IND w/ getting himself into bed.  He is aware a shower is scheduled in the AM. He is voicing concern that staff doesn't give him any privacy to have BM. (RN aware). Currently he is needing up to CGA to use RW for household distances & during ADLs due to mild L side weakness, posterior sway and ataxia.  He is functioning below previous baseline and would benefit from OT services on rehab x 5 days to increase IND w/ balance, safety ADLs/IADLs and return back to work activities    Safety Device - Type of devices:  []  All fall risk precautions in place [x] Bed alarm in place  [x] Call light within reach [] Chair alarm in place [] Positioning belt [x] Gait belt [] Patient at risk for falls [x] Left in bed [] Left in chair [] Telesitter in use [] Sitter present [] Nurse notified []  None      Therapy Time   Individual Co-treatment   Time In 6505     Time Out 1600     Minutes 45       Electronically signed by TOOTIE Conroy #[id number] on 1/16/2023 at 3:32 PM

## 2023-01-16 NOTE — PATIENT CARE CONFERENCE
[facility]  Inpatient Rehabilitation  Weekly Team Conference Note      Date: 2023  Patient Name:  Jonathan Piper    MRN: [id number]  : 1950  Gender:   Physician:   Diagnosis: Acute ischemic right PCA stroke [facility]) [I63.531]    CASE MANAGEMENT  Assessment: goal is home. PHYSICAL THERAPY    Bed Mobility:  Overall Assistance Level: Independent  Sit>supine:  Assistance Level: Independent  Supine>sit:  Assistance Level: Independent    Transfers:  Surface: From bed, From chair with arms, To chair with arms  Device: Walker  Sit>stand:  Assistance Level: Stand by assist  Stand>sit:  Assistance Level: Stand by assist  Bed<>chair  Technique: Stand pivot  Assistance Level: Stand by assist  Skilled Clinical Factors: with and without wheeled walker. Car transfer:  Assistance Level: Stand by assist    Ambulation:  Surface: Level surface, Carpet  Device: Rolling walker  Distance: 500+' with multiple turns  Activity: Within Unit  Activity Comments: patient demonstrating difficulty with left visual field  Additional Factors: Verbal cues, Increased time to complete  Assistance Level: Stand by assist  Gait Deviations: Slow karol, Path deviations  Skilled Clinical Factors: Patient also ambulated short distances in therapy gym and back to patient room without a device with SBA. Stairs:  Stair Height: 6''  Device: Bilateral handrails  Number of Stairs: 12  Additional Factors: Verbal cues, Increased time to complete  Assistance Level: Stand by assist    Curb:  Curb Height: 6''  Device: One handrail  Number of Curbs: 1  Additional Factors: Verbal cues, Increased time to complete    Assessment:  Assessment: MrAundrea \"Murphy\" Vinay Valencia is tolerating high level balance activities well and ranges from CGA-SBA. He does have some minor LOB, but he can recover with CGA.  He does have diffiuclty attending to left side of environment at times and took 20 minutes to find 10 random post it notes on the wall in order ambulating with wheeled walker. He demonstrated good endurance but does fatigue with high level balance activities. Patient is below baseline and will benefit from continued skilled therapy for strengthening, balance training, and neuro re-education. Activity Tolerance: Patient tolerated treatment well  Discharge Recommendations: Continue to assess pending progress, Patient would benefit from continued therapy after discharge        SPEECH THERAPY (intentionally left blank if not actively being seen by this service):  Assessment/Progress: Pt was seen for a SLP evaluation 1/13/2023 and ongoing functional assessment of higher level cognitive-linguistic skills for adv DL tasks. Pt demonstrated functional language skills; and able to participate with assist only with checkbook balancing. Pt self reports does not complete at baseline as bank does it. Pt demonstrate functional working memory for varied reading /PS/mild complex alternating attention tasks. Audible and intelligible connected speech. Will discuss this date/time and potential d/c skilled SLP unless otherwise notified at completion of this meeting. Sean Lanes Flum,MS,CCC,SLP [id number]  Speech and Language Pathologist      OCCUPATIONAL THERAPY    ADL  Feeding: Setup  Grooming: Stand by assistance  Grooming Skilled Clinical Factors: stood at sink to shave  UE Bathing: Stand by assistance, Setup, Verbal cueing  UE Bathing Skilled Clinical Factors: stood during shower, cues to keep 1 hand on GBs AATs  LE Bathing: Contact guard assistance  LE Bathing Skilled Clinical Factors: while in shower, he leans against wall, bumped head on L side as he tried to bend over to dry off feet; OT recommended he sit to dry off feet to reduce fall risk  UE Dressing: Setup  LE Dressing: Contact guard assistance  LE Dressing Skilled Clinical Factors: close SB/CGA during LB dressing, bumps against wall on L side; recommended he sit to place feet into underwear & pants, tries to lean against wall to tie drawstring on pants  Toileting: Contact guard assistance, Stand by assistance  Toileting Skilled Clinical Factors: close SB/CGA due to mild posterior leaning, cues to use Gbs  Additional Comments: shower chair available however he chose to stand for duration of shower, safety concerns when trying to bend over to dry off shins & feet; highly recommended he sit to reduce fall risk. Pt is very tall 6'8''    Bed mobility  Bridging: Supervision  Rolling to Left: Supervision  Rolling to Right: Supervision  Supine to Sit: Supervision  Sit to Supine: Supervision  Scooting: Supervision  Bed Mobility Comments: Flat bed    Functional Transfers: Toilet Transfers  Equipment Used: Grab bars  Toilet Transfer: Contact guard assistance  Toilet Transfers Comments: close SB/CBA using GB, mild posterior lean & to L     Shower Transfers  Shower - Transfer From: [place] - Transfer Type: To  Shower - Transfer To: Standing  Shower - Technique: Ambulating  Shower Transfers: Contact Guard  Shower Transfers Comments: close SB/CGA to use GBs to enter/exit shower stall, did not use shower chair; cues to keep 1 hand on GB AATs during shower         IADLs:  Meal Prep  Meal Prep Level: Walker  Meal Prep Level of Assistance: Stand by assistance  Meal Preparation: SBA to use RW into kitchen, open fridge & retrieve milk carton; he could not locate it on L side, required cues to look far L and describe the color of carton (white & blue). Cues to locate bowl, cereal & to stand closer w/ RW to avoid reaching outside ABEBA. No LOB but cues for vision issues       Light Housekeeping      IADL to simulate work related tasks: Pt was able to sweep up small items from floor using [address] dustpan & broom w/ SBA, no LOB but moves swiftly, cues to empty into trashcan.  He was able to empty the laundry bin bag w/ close SB/CGA, tied it up & carried down hallway to the soiled utility room--some dizziness reporting and swaying noted--provided CGA for balance when not using AD (suggested he carry items in both hands & hold close to his chest to reduce unbalanced load). UE function:  WFLs, L hand 4+/5    Assessment:  Assessment: pt making good gains w/ OT intervention. He is able to use RW thru gym<>kitchen and to  objects from floor using reacher & basket w/ SBA. He needed cues to see items on his far left including grabbing milk carton in fridge. He was able to make himself a bowl of cereal without difficulty, stood @ 5 minutes; took a few bites of it but discussed his poor appetite--he mostly eats 1-2 times a day & eats out including [place], [place], [employer] and [place]. Pt wants to start using a cane for fxl mobility as the [facility] not likely to accommodate a RW; he denies need for any DME; will be able to stand for shower. Recommend continued OT services thru EOW to meet higher level of IND. He would benefit from home OT & then transition into work hardening program/BVR    Activity Tolerance: Patient tolerated treatment well  Discharge Recommendations: Outpatient OT  Factors Affecting Discharge: L visual field cut           Assessment: pt is a 82 y/o male who arrived from [facility] after a stroke; he presented w/ HA, visual disturbance and L mingo body weakness & ataxia. PTA< he was completely IND w/ all aspects of ADLs, IADLs, was living at "[employer] working F/T as . Pt takes bus or has boss pick him up. Currently he is needing up to CGA to use RW for household distances & during ADLs due to mild L side weakness, posterior sway and ataxia. He is functioning below previous baseline and would benefit from OT services on rehab x 5 days to increase IND w/ balance, safety ADLs/IADLs and return back to work activities           NUTRITION  Most recent weightWeight: 164 lb 0.4 oz (74.4 kg)  BSA (Calculated - sq m): 2.07 sq meters  BMI (Calculated): 18.1  Receives 5 Carb diet with variable intakes throughout admission.  Glucerna ordered BID to promote adequate PO. Please see nutrition note for details. NURSING  Pt is continent of bowel and bladder, blood sugar checks, fall risk, monitor and maintain skin integrity. Family Education: Patient Education: medications, diet, diabetic needs, safety and fall prevention, skin care and prevention. MEDICAL      TEAM SUMMARY AND DISCHARGE PLAN  Estimated Length of Stay:1/21/2023  Destination: home health  Anticipated Services at Discharge:    [x] OT  [x] PT   [] SLP    [x] RN   [] Home Health aide []   Community Resources: _______________________________  Equipment recommendations:  [] Hospital bed [] Tub bench  [] Shower chair [] Hand held shower  [] Raised toilet seat [] Toilet safety frame [] Bedside commode   [] W/C: _____  [] Rolling Walker [] Standard walker [] Gait belt [x] cane: _Tall________  [] Sliding board [] Alternate seating/furniture [] O2 [] Hip Kit: _______  [] Life Line [] Other: _______  Factors facilitating achievement of predicted outcomes: Motivated, Cooperative, and Pleasant  Barriers to the achievement of predicted outcomes/Interventions:     L visual field cut, dizziness, poor appetite    Interdisciplinary Individualized Plan of Care Review:    Continue Current Plan of Care: Yes    Modifications:_____________________________    Special Needs in the Upcoming Week :    [] Family/Caregiver Education  [] Home visit  []Therapeutic Pass   [] Consults:_______    [] Other;_______    Patient Rehab Team Goals for the Upcoming Week:  1. I ambulation community distances. 2. Toilet and shower level bathing tasks w/ MI  3. Team Members Present at Conference:  Physician:Dr. Blossom Olvera MD  : Lilia Alcantar [facility]    Occupational Therapist: Wade Vazquez, OTR/L  Physical Sanjay Lozoya, [id number]  Speech Therapist: Jessika Mayorga. Flum,MS,CCC,SLP [id number]  Speech and Language Pathologist    ARZOEY Parker RN CRRN  Dietician: Tomas Knutson RD, VENICE  Psychologist:  Other:      I led this team conference and I approve the established interdisciplinary plan of care as documented within the medical record of Mo Sanchez. MD: Ana Laura Lobato.  Claudean So, MD 1/17/2023, 3:12 PM

## 2023-01-17 LAB
GLUCOSE BLD-MCNC: 180 MG/DL (ref 70–99)
GLUCOSE BLD-MCNC: 182 MG/DL (ref 70–99)
GLUCOSE BLD-MCNC: 199 MG/DL (ref 70–99)
GLUCOSE BLD-MCNC: 205 MG/DL (ref 70–99)
PERFORMED ON: ABNORMAL

## 2023-01-17 PROCEDURE — 6360000002 HC RX W HCPCS: Performed by: PHYSICAL MEDICINE & REHABILITATION

## 2023-01-17 PROCEDURE — 97129 THER IVNTJ 1ST 15 MIN: CPT

## 2023-01-17 PROCEDURE — 97130 THER IVNTJ EA ADDL 15 MIN: CPT

## 2023-01-17 PROCEDURE — 97530 THERAPEUTIC ACTIVITIES: CPT | Performed by: PHYSICAL THERAPIST

## 2023-01-17 PROCEDURE — 94760 N-INVAS EAR/PLS OXIMETRY 1: CPT

## 2023-01-17 PROCEDURE — 97116 GAIT TRAINING THERAPY: CPT | Performed by: PHYSICAL THERAPIST

## 2023-01-17 PROCEDURE — 6370000000 HC RX 637 (ALT 250 FOR IP): Performed by: PHYSICAL MEDICINE & REHABILITATION

## 2023-01-17 PROCEDURE — 1280000000 HC REHAB R&B

## 2023-01-17 PROCEDURE — 97535 SELF CARE MNGMENT TRAINING: CPT

## 2023-01-17 PROCEDURE — 97530 THERAPEUTIC ACTIVITIES: CPT

## 2023-01-17 PROCEDURE — 97110 THERAPEUTIC EXERCISES: CPT | Performed by: PHYSICAL THERAPIST

## 2023-01-17 PROCEDURE — 97110 THERAPEUTIC EXERCISES: CPT

## 2023-01-17 RX ORDER — MIRTAZAPINE 15 MG/1
15 TABLET, FILM COATED ORAL NIGHTLY
Status: DISCONTINUED | OUTPATIENT
Start: 2023-01-17 | End: 2023-01-21 | Stop reason: HOSPADM

## 2023-01-17 RX ADMIN — SENNOSIDES AND DOCUSATE SODIUM 1 TABLET: 50; 8.6 TABLET ORAL at 21:49

## 2023-01-17 RX ADMIN — HEPARIN SODIUM 5000 UNITS: 5000 INJECTION INTRAVENOUS; SUBCUTANEOUS at 21:49

## 2023-01-17 RX ADMIN — ACETAMINOPHEN 325MG 650 MG: 325 TABLET ORAL at 07:50

## 2023-01-17 RX ADMIN — ASPIRIN 81 MG: 81 TABLET, COATED ORAL at 07:50

## 2023-01-17 RX ADMIN — ATORVASTATIN CALCIUM 80 MG: 80 TABLET, FILM COATED ORAL at 21:49

## 2023-01-17 RX ADMIN — HEPARIN SODIUM 5000 UNITS: 5000 INJECTION INTRAVENOUS; SUBCUTANEOUS at 11:28

## 2023-01-17 RX ADMIN — ACETAMINOPHEN 325MG 650 MG: 325 TABLET ORAL at 22:58

## 2023-01-17 RX ADMIN — MIRTAZAPINE 15 MG: 15 TABLET, FILM COATED ORAL at 21:49

## 2023-01-17 RX ADMIN — QUETIAPINE FUMARATE 50 MG: 25 TABLET ORAL at 21:49

## 2023-01-17 ASSESSMENT — PAIN - FUNCTIONAL ASSESSMENT: PAIN_FUNCTIONAL_ASSESSMENT: ACTIVITIES ARE NOT PREVENTED

## 2023-01-17 ASSESSMENT — PAIN DESCRIPTION - DESCRIPTORS
DESCRIPTORS: ACHING
DESCRIPTORS: DULL

## 2023-01-17 ASSESSMENT — PAIN SCALES - GENERAL
PAINLEVEL_OUTOF10: 0
PAINLEVEL_OUTOF10: 4
PAINLEVEL_OUTOF10: 3

## 2023-01-17 ASSESSMENT — PAIN DESCRIPTION - LOCATION
LOCATION: HEAD
LOCATION: HEAD

## 2023-01-17 ASSESSMENT — PAIN DESCRIPTION - FREQUENCY
FREQUENCY: INTERMITTENT
FREQUENCY: INTERMITTENT

## 2023-01-17 ASSESSMENT — PAIN DESCRIPTION - ONSET
ONSET: ON-GOING
ONSET: GRADUAL

## 2023-01-17 ASSESSMENT — PAIN DESCRIPTION - PAIN TYPE
TYPE: ACUTE PAIN
TYPE: ACUTE PAIN

## 2023-01-17 NOTE — PLAN OF CARE
Problem: Discharge Planning  Goal: Discharge to home or other facility with appropriate resources  1/17/2023 1327 by Mauricio Cho RN  Outcome: Progressing     Problem: Safety - Adult  Goal: Free from fall injury  1/17/2023 1327 by Mauricio Cho RN  Outcome: Progressing     Problem: Pain  Goal: Verbalizes/displays adequate comfort level or baseline comfort level  1/17/2023 1327 by Mauricio Cho RN  Outcome: Progressing     Problem: Neurosensory - Adult  Goal: Achieves stable or improved neurological status  1/17/2023 1259 by Mauricio Cho RN  Outcome: Progressing  Flowsheets (Taken 1/17/2023 0750)  Achieves stable or improved neurological status: Assess for and report changes in neurological status     Problem: Neurosensory - Adult  Goal: Absence of seizures  1/17/2023 1259 by Mauricio Cho RN  Outcome: Progressing     Problem: Neurosensory - Adult  Goal: Achieves maximal functionality and self care  1/17/2023 1259 by Mauricio Cho RN  Outcome: Progressing     Problem: Neurosensory - Adult  Goal: Remains free of injury related to seizures activity  1/17/2023 1259 by Mauricio Cho RN  Outcome: Progressing     Problem: Respiratory - Adult  Goal: Achieves optimal ventilation and oxygenation  1/17/2023 1259 by Mauricio Cho RN  Outcome: Progressing     Problem: Cardiovascular - Adult  Goal: Maintains optimal cardiac output and hemodynamic stability  1/17/2023 1259 by Mauricio Cho RN  Outcome: Progressing  Flowsheets (Taken 1/17/2023 0750)  Maintains optimal cardiac output and hemodynamic stability: Monitor blood pressure and heart rate     Problem: Cardiovascular - Adult  Goal: Absence of cardiac dysrhythmias or at baseline  1/17/2023 1259 by Mauricio Cho RN  Outcome: Progressing  Flowsheets (Taken 1/17/2023 0750)  Absence of cardiac dysrhythmias or at baseline: Monitor cardiac rate and rhythm     Problem: Skin/Tissue Integrity - Adult  Goal: Skin integrity remains intact  1/17/2023 1259 by Mauricio Cho RN  Outcome: Progressing  Flowsheets  Taken 1/17/2023 1256  Skin Integrity Remains Intact: Monitor for areas of redness and/or skin breakdown  Taken 1/17/2023 0750  Skin Integrity Remains Intact: Monitor for areas of redness and/or skin breakdown     Problem: Skin/Tissue Integrity - Adult  Goal: Incisions, wounds, or drain sites healing without S/S of infection  1/17/2023 1259 by German Noel RN  Outcome: Progressing     Problem: Skin/Tissue Integrity - Adult  Goal: Oral mucous membranes remain intact  1/17/2023 1259 by German Noel RN  Outcome: Progressing     Problem: Musculoskeletal - Adult  Goal: Maintain proper alignment of affected body part  1/17/2023 1259 by German Noel RN  Outcome: Progressing     Problem: Musculoskeletal - Adult  Goal: Return ADL status to a safe level of function  1/17/2023 1259 by German Noel RN  Outcome: Progressing     Problem: Gastrointestinal - Adult  Goal: Minimal or absence of nausea and vomiting  1/17/2023 1259 by German Noel RN  Outcome: Progressing     Problem: Gastrointestinal - Adult  Goal: Maintains or returns to baseline bowel function  1/17/2023 1259 by German Noel RN  Outcome: Progressing  [address] (Taken 1/17/2023 0750)  Maintains or returns to baseline bowel function: Assess bowel function     Problem: Genitourinary - Adult  Goal: Absence of urinary retention  1/17/2023 1259 by German Noel RN  Outcome: Progressing

## 2023-01-17 NOTE — PROGRESS NOTES
Physical Therapy  Facility/Department: [facility] REHAB  Rehabilitation Physical Therapy Treatment Note    NAME: Rosa Spivey  : 1950 (81 y.o.)  MRN: [id number]  CODE STATUS: Full Code    Date of Service: 23    Restrictions:  Restrictions/Precautions: Fall Risk  Position Activity Restriction  Other position/activity restrictions: reg diet     SUBJECTIVE  Subjective  Subjective: Pt reports not being able to sleep again last night. reports feeling sleepy. Pain: no complaints of pain at this time. OBJECTIVE  Cognition  Overall Cognitive Status: Exceptions  Memory: Decreased short term memory  Safety Judgement: Decreased awareness of need for safety  Insights: Decreased awareness of deficits  Cognition Comment: pt is cooperative, mild word finding & mild slow processing, has L visual field cut & dizziness which impacts safety w/ transfers & ADLs  Orientation  Overall Orientation Status: Within Normal Limits    Functional Mobility  Balance  Sitting Balance: Independent  Standing Balance: Stand by assistance (with and without UE support)  Transfers  Surface: From bed;From chair with arms; To chair with arms  Device: Cane (simulated)  Sit to Stand  Assistance Level: Stand by assist  Stand to Sit  Assistance Level: Stand by assist  Bed To/From Chair  Technique: Stand pivot  Assistance Level: Stand by assist  Skilled Clinical Factors: with and without wheeled walker. Environmental Mobility  Ambulation  Surface: Level surface; Carpet  Device: Cane (simulated)  Distance: 400' with figure 8s on carpet  Activity: Within Unit  Activity Comments: patient demonstrating difficulty with left visual field  Additional Factors: Verbal cues; Increased time to complete  Assistance Level: Stand by assist  Gait Deviations: Slow karol; Path deviations  Stairs  Stair Height: 6''  Device: Bilateral handrails  Number of Stairs: 24 carrying a bag of laundry  Additional Factors: Verbal cues; Increased time to complete  Assistance Level: Stand by assist  Curb  Curb Height: 6''  Device: One handrail  Number of Curbs: 1  Additional Factors: Verbal cues; Increased time to complete    PT Exercises  Dynamic Standing Balance Exercises: Pt was able to stand on AirEx foam for dynamic standing balance for 30\" in Rhomberg and sharp rhomberg positions with eyes open then closed. He did better than last time we did this. Standing Open/Closed Kinetic Chain Exercises: Mini Squats, marches, heel/toe raises, ABD/ADD, hip ext and knee flexion alternating x15 reps with cues for form. Step ups and lat step ups with 4\" block x10. His balance was challanged more by alternating than doing same leg exs. Asked him to hold onto the // bars as little as possible. PM Session:  Vital Signs  Heart Rate: 51  Heart Rate Source: Monitor  BP: 138/76  BP Location: Right Arm  MAP (Calculated): 97  SpO2: 98 %  Comment: vitals taken in supine, sitting vitals: 127/79, HR 60, O2 96% with little dizziness, standing vitals 114/75 HR 67 O2 sats 99% with increased dizziness    Exercise Equipment: Patient performed 10 minutes on NuStep for BLE strengthening, conditioning and reciprocal movement with resistance at 5, seat at 15, bilateral UE at 15  Core strengthening exercises with ball 5 way    Transfers  Surface: From bed;From chair with arms; To chair with arms  Device: Cane (simulated)  Sit to Stand  Assistance Level: Stand by assist  Stand to Sit  Assistance Level: Stand by assist  Bed To/From Chair  Technique: Stand pivot  Assistance Level: Stand by assist  Skilled Clinical Factors: with and without wheeled walker. Environmental Mobility  Ambulation  Surface: Level surface; Carpet  Device: none  Distance: 400' with figure 8s on carpet  Activity: Within Unit  Activity Comments: patient demonstrating difficulty with left visual field  Additional Factors: Verbal cues; Increased time to complete  Assistance Level: Stand by assist  Gait Deviations: Slow karol; Path deviations    ASSESSMENT/PROGRESS TOWARDS GOALS    Assessment  Assessment: Mr. \"Murphy\" Juarez Mendez is tolerating high level balance activities well and ranges from CGA-SBA. He does have some minor LOB, but he can recover with CGA. He does have diffiuclty attending to left side of environment at times and will bump or brush into the wall on the R.  He demonstrates good endurance but does fatigue with high level balance activities. He continues to c/o dizziness upon standing and BP shows an 11 point drop between supine and sitting and a 13 point derop from sitting to standing, HR between 51 and 67. Patient is below baseline and will benefit from continued skilled therapy for strengthening, balance training, and neuro re-education. Activity Tolerance: Patient tolerated treatment well  Discharge Recommendations: Continue to assess pending progress; Patient would benefit from continued therapy after discharge  PT Equipment Recommendations  Other: Will continue to assess    Goals  Patient Goals   Patient Goals : I want to go back to work  Short Term Goals  Time Frame for Short Term Goals: 1 week  Short Term Goal 1: I bed mobility-met  Short Term Goal 2: Mod I for all functional transfers  Short Term Goal 3: Amb 200' with Mod I and LRAD  Short Term Goal 4: Up/down curb with mod I  Short Term Goal 5: Up/down 12 steps with mod I    PLAN OF CARE/SAFETY  Physcial Therapy Plan  General Plan:  minutes of therapy at least 5 out of 7 days a week  Days Per Week: 5 Days  Hours Per Day: 1.5 hours  Therapy Duration: 4-7 Days  Current Treatment Recommendations: Strengthening;Balance training;Functional mobility training;Transfer training; Endurance training;Gait training;Stair training;Neuromuscular re-education; Return to work related activity;Home exercise program;Safety education & training;Patient/Caregiver education & training;Equipment evaluation, education, & procurement; Therapeutic activities  Safety Devices  Type of Devices: All fall risk precautions in place;Gait belt;Left in chair;Patient at risk for falls    EDUCATION  Education  Education Given To: Patient  Education Provided: Plan of Care;Transfer Training;Mobility Training;Equipment; Safety  Education Provided Comments: importance of attending to left side of environment when moving, discussed the importance of nutrition on healing, he hasn't been eating much since he's been here  Education Method: Verbal;Demonstration  Barriers to Learning: None  Education Outcome: Verbalized understanding        Therapy Time   Individual Concurrent Group Co-treatment   Time In 0815         Time Out 0900         Minutes 45           Timed Code Treatment Minutes: [address] [facility]   Time In 1345     Time Out 1430     Minutes 555 Ashok Marcus, PT, 01/17/23 at 2:13 PM

## 2023-01-17 NOTE — PROGRESS NOTES
Patient admitted to rehab with acute ischemic right PCA stroke. A/Ox4. Transfers with no device. Mobility restrictions: WBAT. On regular diet, tolerating well, needs encouragement appetite remains poor. Medications taken whole. On Heparin for DVT prophylaxis. Skin: intact. Oxygen: room tashi. LDA: none. Has been continent of bowel and of bladder. LBM 1/14. Chair/bed alarms in use and call light in reach. Will monitor for safety.  Electronically signed by Grant Barry RN, CRRN on 1/17/23 at 1:30 PM EST

## 2023-01-17 NOTE — CARE COORDINATION
Referral made to Romeo of [address] [place] [address] for a [employer] She will look to see if she has one in inventory. Team Conference held today. Team reviewed barriers:  left visual issues, some dizziness, some balance issues. Team recomends DC to home on Saturday, 1- with home care orders for sn/pt/ot. DME:  [employer]. Will possibly need BVR Referral.      Call placed to [facility] pharmacy regarding [employer]. He does not have in stock. Call placed to [facility]; they need exact measurements from floor to bend in wrist to ensure they have in stock. Call placed to pt's insurance to inquire about how to order dme and home care. For home care:   fax orders, facesheet , H & P and therapy notes to them at  [phone] and they will arrange for home care.     For DME:   Fax order to [phone] and they will deliver to hospital.    Zaina Green [place]     Case Management   [phone]    1/17/2023  12:51 PM

## 2023-01-17 NOTE — ACP (ADVANCE CARE PLANNING)
Advance Care Planning     Advance Care Planning Inpatient Note  [facility] Department    Today's Date: 1/17/2023  Unit: [facility] IP REHAB    Received request from IDT Member. Upon review of chart and communication with care team, patient's decision making abilities are not in question. . Patient was/were present in the room during visit. Goals of ACP Conversation:  Discuss advance care planning documents    Health Care Decision Makers:     No healthcare decision makers have been documented. Click here to complete [address] including selection of the Healthcare Decision Maker Relationship (ie \"Primary\")  Summary:  No Decision Maker named by patient at this time    Advance Care Planning Documents (Patient Wishes):  None     Assessment:   provided education on Adv. Directives on two visits. Mr. Beltran Mckeon expressed that he wants his sister to be his HCPOA but declines invitation to complete while in the hospital.  gave Mr. Palomino Page documents with instructions for completing independently.     Interventions:  Provided education on documents for clarity and greater understanding  Discussed and provided education on state decision maker hierarchy    Care Preferences Communicated:   No    Outcomes/Plan:  ACP Discussion: Completed    Electronically signed by Mathew Argueta, [employer] on 1/17/2023 at 6:29 PM

## 2023-01-17 NOTE — PROGRESS NOTES
Speech Language Pathology  ACUTE REHAB UNIT  SPEECH/LANGUAGE PATHOLOGY      [x] Daily  [] Weekly Care Conference Note  [] Discharge    Patient:Popeye Helton  DBR:[id number]  Rehab Dx/Hx: Acute ischemic right PCA stroke ([facility] [address] [I63.531]    Precautions: FAll risk; Reports of visual problems  Home situation: Has roommates  ST Dx: [] Aphasia  [] Dysarthria  [] Apraxia   [] Oropharyngeal dysphagia [x] Cognitive   Impairment  [] Other:   Initial Speech Therapy Assessment Diagnosis:   Speech Therapy Diagnosis  Cognitive Diagnosis: Pt demonstrated functional temporal orientation; attention; working memory and STM ;and concrete VPS/PS and reasoning on testing. Pt wtih deficits on testing performance for complex reasoning . Further assessment of executive funciton unless otherwise notified  Speech Diagnosis: Audible and intelligible connected speech. Communication Diagnosis: Happy language skills appear intact. Pt wtih breakdonw with complex processing (ie extra time or assist). This may be baseline. Pt was functional in verbal expression of needs/wants; CFN; concept explanations/event explanations. Date of Admit: 1/12/2023  Room #: E9L-0522/6518-11  Date: 1/17/2023       Current functional status (updated daily):         Current Diet Order:ADULT DIET; Regular; 5 carb choices (75 gm/meal)     Behavior: [x] Alert  [x] Cooperative  [x]  Pleasant  [] Confused  [] Agitated  [] Uncooperative  [] Distractible [] Motivated  [] Self-Limiting [] Anxious  [] Other:  Endurance:  [x] Adequate for participation in SLP sessions  [] Reduced overall  [] Lethargic  [] Other:  Safety: [] No concerns at this time  [] Reduced insight into deficits  []  Reduced safety awareness [] Not following call light procedures  [] Unable to Assess  [x] Other:Potential concern for visual deficits and mobility.  Thus far with language tasks; pt spontaneously scanning left    Barriers toward progress: None unless caregiver support or assist           Date: 1/17/2023      Tx session 1 Tx session 2   Total Timed Code Min   SLP Individual Minutes  Time In: 9984  Time Out: 0945  Minutes: 35  Coded treatment time 35  N/a   Group Treatment Minutes 0 0   Co-Treat Minutes 0 0   Variance/Reason:  N/a    Pain denied    Pain Intervention [] RN notified  [] Repositioned  [] Intervention offered and patient declined  [] N/A  [] Other: [] RN notified  [] Repositioned  [] Intervention offered and patient declined  [] N/A  [] Other:   Subjective     Pt seen in treatment office  Good effort     Objective:  Goals     Cognitive-communication goalsGoals: Pt goal is to get back home   Therapy working toward pt goals of IND with cognitive-communicaiton skills for concrete and mild complex ADV DL situations N/A       Goal 1: Pt will partiicpatein ongoing assessment of cognitive-linguistic skills for executive funciton unless otherwise notified   ABCD subtests of Word Learning and Story REcall  Free recall construct score of 3  Total recall: construct score of 4   Recall by recognition: construct score of 4  Delayed story recall: construct score of 4        JAKI (norms for >72years of age)  Writing phone Messages:  score profile of '1' indicating high probability of IND N/A   Other areas targeted:     Education:   Ongoing pt ed; plan to discuss at team meeting  today    Safety Devices: [] Call light within reach  [] Chair alarm activated  [] Bed alarm activated  [x] Other: transport returned pt to room [] Call light within reach  [] Chair alarm activated  [] Bed alarm activated  [] Other:    Progress Assessment: 1/16/2023: Will discuss at team meeting. JAKI (norms for >72ears of age): Reading Analog clocks: 100%; score profile of '1' indicating high probability of IND. Counting money: 90% ; score profile of '1' indicating high probability of INDSolving Daily Math Problems: 90% ; score profile of '1' indicating high probability of IND.  Writing a Check and Balancing Checkbook:Pt achieved a score profile of '2' indicating some level of assist. Pt self reports he does not fill out ledger and the bank does all of this balancing for him. Reading Instructions: : 80%; score profile of '1' indicating high probability of IND. Understanding Med Labels: 100%; score profile of '1' indicating high probability of IND. Using a Calendar: 100%; score profile of '1' indicating high probability of IND  1/17/2023: pt demonstrating functional left visual attention during reading and written expression tasks using 8x11 page. Will discuss at team meeting  1/17/2023 post team meeting. Persistent concern and physical deficits and reports of reduced visual attention. OT is discussing potential BVR when medically cleared for return to work. SLP will complete one more session and anticipate d/c   Plan: Continue as per plan of care. Continued Tx Upon Discharge: ?    [] Yes [] No [x] TBD based on progress while on ARU [] Vital Stim indicated [] Other:   Estimated discharge date: TBD   Discharge recommendations:   [] Home independently  [] Home with assistance []  24 hour supervision  [] ECF [] Other:     Additional information:     Interventions used during Rehab Stay:  [] Speech/Language Treatment  [] Instruction in HEP [] Group [] Dysphagia Treatment [x] Cognitive Treatment   [] Other:          Electronically Signed by    Salomon Bal. Farhan,MS,CCC,SLP [id number]  Speech and Language Pathologist

## 2023-01-17 NOTE — PROGRESS NOTES
Department of Physical Medicine & Rehabilitation  Progress Note    Patient Identification:  Cyn Storey  [id number]  : 1950  Admit date: 2023    Chief Complaint: Acute ischemic right PCA stroke ([address]    Subjective:   No acute events overnight. Patient seen this am resting in bed. Reports chronic poor appetite. Denies abdominal pain, n/v, early satiety. He does reports depressed mood, particularly since November (related to death of family member, birthdays of his parents, his own medical issues). We discussed treatment options including therapy and medication. He would like to try medication. Labs reviewed. ROS: No f/c, n/v, cp     Objective:  Patient Vitals for the past 24 hrs:   BP Temp Temp src Pulse Resp SpO2   23 0850 -- -- -- -- -- 98 %   23 0750 102/69 98.7 °F (37.1 °C) Oral 63 16 98 %   23 0414 106/67 97.8 °F (36.6 °C) Oral 62 -- 100 %   23 0119 (!) 102/58 98 °F (36.7 °C) Oral 52 -- 97 %   23 2045 117/70 98.5 °F (36.9 °C) Oral 57 16 99 %   23 1633 115/72 97.8 °F (36.6 °C) Oral 54 16 99 %   23 1224 105/69 97.2 °F (36.2 °C) Oral 70 17 100 %     Const: Alert. No distress, pleasant. HEENT: Normocephalic, atraumatic. Normal sclera/conjunctiva. MMM. CV: Regular rhythm, rate ~60  Resp: No respiratory distress. Lungs CTAB. Abd: Soft, nontender, nondistended, NABS+   Ext: No edema. Neuro: Alert, oriented, appropriately interactive. Left VF cut. Psych: Cooperative, appropriate mood and affect    Laboratory data: Available via EMR.    Last 24 hour lab  Recent Results (from the past 24 hour(s))   POCT Glucose    Collection Time: 23 12:03 PM   Result Value Ref Range    POC Glucose 155 (H) 70 - 99 mg/dl    Performed on ACCU-CHEK    POCT Glucose    Collection Time: 23  4:35 PM   Result Value Ref Range    POC Glucose 179 (H) 70 - 99 mg/dl    Performed on ACCU-CHEK    POCT Glucose    Collection Time: 23  8:22 PM   Result Value Ref Range    POC Glucose 181 (H) 70 - 99 mg/dl    Performed on ACCU-CHEK    POCT Glucose    Collection Time: 01/17/23  7:48 AM   Result Value Ref Range    POC Glucose 180 (H) 70 - 99 mg/dl    Performed on ACCU-CHEK        Therapy progress:  Physical therapy:  Bed Mobility:  Overall Assistance Level: Independent  Sit>supine:  Assistance Level: Independent  Supine>sit:  Assistance Level: Independent  Transfers:  Surface: From bed, From chair with arms, To chair with arms  Device: Cane (simulated)  Sit>stand:  Assistance Level: Stand by assist  Stand>sit:  Assistance Level: Stand by assist  Bed<>chair  Technique: Stand pivot  Assistance Level: Stand by assist  Skilled Clinical Factors: with and without wheeled walker. Stand Pivot:     Lateral transfer:     Car transfer:  Assistance Level: Stand by assist  Ambulation:  Surface: Level surface, Carpet  Device: Cane (simulated)  Distance: 400' with figure 8s on carpet  Activity: Within Unit  Activity Comments: patient demonstrating difficulty with left visual field  Additional Factors: Verbal cues, Increased time to complete  Assistance Level: Stand by assist  Gait Deviations: Slow karol, Path deviations  Skilled Clinical Factors: Patient also ambulated short distances in therapy gym carrying a bag of laundry  Stairs:  Stair Height: 6''  Device: Bilateral handrails  Number of Stairs: 24 carrying a bag of laundry  Additional Factors: Verbal cues, Increased time to complete  Assistance Level: Stand by assist  Curb:  Curb Height: 6''  Device: One handrail  Number of Curbs: 1  Additional Factors: Verbal cues, Increased time to complete  Wheelchair:     Assessment:  Assessment: Mr. \"Bill\" Charlette English is tolerating high level balance activities well and ranges from CGA-SBA. He does have some minor LOB, but he can recover with CGA.  He does have diffiuclty attending to left side of environment at times and took 20 minutes to find 10 random post it notes on the wall in order ambulating with wheeled walker. He demonstrated good endurance but does fatigue with high level balance activities. Patient is below baseline and will benefit from continued skilled therapy for strengthening, balance training, and neuro re-education. Activity Tolerance: Patient tolerated treatment well  Discharge Recommendations: Continue to assess pending progress, Patient would benefit from continued therapy after discharge      Occupational therapy:   Feeding  Assistance Level: Independent  Grooming/Oral Hygiene  Assistance Level: Stand by assist  Skilled Clinical Factors: stood to wash hands with SBA  UE Bathing     LE Bathing     UE Dressing     LE Dressing     Putting On/Taking Off Footwear  Assistance Level: Independent  Skilled Clinical Factors: crossed LE's to don shoes  Toileting  Assistance Level: Stand by assist  Assessment:  Assessment: pt making good gains w/ OT intervention. He is able to use RW thru gym<>kitchen and to  objects from floor using reacher & basket w/ SBA. He needed cues to see items on his far left including grabbing milk carton in fridge. He was able to make himself a bowl of cereal without difficulty, stood @ 5 minutes; took a few bites of it but discussed his poor appetite--he mostly eats 1-2 times a day & eats out including [place], [place], [employer] and [place]. Pt wants to start using a cane for fxl mobility as the [facility] not likely to accommodate a RW; he denies need for any DME; will be able to stand for shower. Recommend continued OT services thru [facility] to meet higher level of IND  Activity Tolerance: Patient tolerated treatment well  Discharge Recommendations: Outpatient OT  Factors Affecting Discharge: L visual field cut    Speech therapy:    Speech Therapy Diagnosis  Cognitive Diagnosis: Pt demonstrated functional temporal orientation; attention; working memory and STM ;and concrete VPS/PS and reasoning on testing.  Pt wtih deficits on testing performance for complex reasoning . Further assessment of executive funciton unless otherwise notified  Speech Diagnosis: Audible and intelligible connected speech. Communication Diagnosis: [place] language skills appear intact. Pt wtih breakdonw with complex processing (ie extra time or assist). This may be baseline. Pt was functional in verbal expression of needs/wants; CFN; concept explanations/event explanations. PT  Position Activity Restriction  Other position/activity restrictions: reg diet  Objective     Sit to Stand: Contact guard assistance  Stand to Sit: Contact guard assistance  Bed to Chair: Contact guard assistance  Device: Rolling Walker, No Device  Assistance: Contact guard assistance  Distance: 50', 150', 200'. 200' x2 w/o AD  OT  PT Equipment Recommendations  Other: Will continue to assess  Equipment Used: Grab bars  Toilet Transfers Comments: close SB/CBA using GB, mild posterior lean & to L  Assessment        SLP          Body mass index is 18.02 kg/m². Rehabilitation Diagnosis:   Stroke, 1.1, Left Body (R Brain)        Assessment and Plan:     Impairments:left hemiparesis, left VF deficit, decreased balance, cognition     Acute ischemic right PCA stroke with hemorrhagic transformation  -Localization: P2 occlusion affecting R temporal and occipital lobes, thalamus  -Etiology: concern for embolic source  -Secondary ppx: ASA, statin  -Telemetry while inpatient, then cardiac event monitor  -PT/OT/SLP     DM2 with hyperglycemia  -Hgb A1C 10  -metformin, ISS -- monitor trend, improving     dCHF  -Daily wt  -BP and HR unable to tolerate bb, acei     Sinus bradycardia  -Asymptomatic? Patient does report intermittent dizziness over the past 2-3 years. Will see if this coincides with bradycardia on monitor. ---Dizzy with PT today. HR 50s-60s at this time which is consistent with baseline. Orthostatic VS slightly positive.  Encouraging po intake which has been poor.   -Telemetry     SHELLIE vs CKD  -Cr 1.3-1.5 at [facility]  -Avoid nephrotoxins, renally dose meds  -Monitor renal function     Right frontal convexity Meningioma  -Incidental finding  -Plan for f/u with Dr. Diana Paulson as outpatient    Depression, low appetite  -Start Remeron     Bladder   -High risk retention   -Monitor PVRs, SC prn >300cc     Bowel   -High risk constipation   -senna+colace BID, PRN miralax, MoM, and bisacodyl supp. Safety   -fall precautions     Pain control  -acetaminophen prn     DVT ppx  -heparin    Rehab Progress: Interdisciplinary team conference was held today with entire rehab treatment team including PT, OT, SLP (if applicable), Dietician, RN, and SW. Discussion focused on progress toward rehab goals and discharge planning. Making progress. Working on balance, functional mobility, compensatory strategies, cognition. Limited mostly by left VF cut. Separate conference then held with patient/family (if available), questions answered and concerns addressed. Total treatment time >50 min with greater than 50% spent in care coordination. Anticipated Dispo: home to [address] [place] [address]: Denys Ambriz PT, OT, RN  DME: Tall cane  JULY: 1/21      Ke Silveira.  Radha Glez MD 1/17/2023, 10:32 AM

## 2023-01-17 NOTE — PLAN OF CARE
Problem: Safety - Adult  Goal: Free from fall injury  Outcome: Progressing     Problem: Pain  Goal: Verbalizes/displays adequate comfort level or baseline comfort level  Outcome: Progressing     Problem: Skin/Tissue Integrity - Adult  Goal: Skin integrity remains intact  Outcome: Progressing  Goal: Incisions, wounds, or drain sites healing without S/S of infection  Outcome: Progressing  Goal: Oral mucous membranes remain intact  Outcome: Progressing

## 2023-01-17 NOTE — PROGRESS NOTES
This is a 81 y.o.  male admitted on 1/12/2023 with Acute ischemic right PCA stroke. Pt A&O X4. Denies any pain, numbness, and tingling. HR regular. Lungs sounds clear. Denies any chest pain or shortness of breath. Abd soft and nontender. Active bowel sounds. Last BM 01/14/23. Continent of bowel & bladder. Depends on. Denies any abd discomfort. Transfers with walker X1. Pills whole with thin. HS medication given. Tolerated well. Call light in reach. Patient instructed to call if there is any needs or changes.   Electronically signed by Zoe Salazar RN on 1/17/2023 at 1:21 AM

## 2023-01-18 LAB
GLUCOSE BLD-MCNC: 131 MG/DL (ref 70–99)
GLUCOSE BLD-MCNC: 170 MG/DL (ref 70–99)
GLUCOSE BLD-MCNC: 211 MG/DL (ref 70–99)
GLUCOSE BLD-MCNC: 281 MG/DL (ref 70–99)
PERFORMED ON: ABNORMAL

## 2023-01-18 PROCEDURE — 97110 THERAPEUTIC EXERCISES: CPT

## 2023-01-18 PROCEDURE — 97130 THER IVNTJ EA ADDL 15 MIN: CPT

## 2023-01-18 PROCEDURE — 97129 THER IVNTJ 1ST 15 MIN: CPT

## 2023-01-18 PROCEDURE — 6370000000 HC RX 637 (ALT 250 FOR IP): Performed by: PHYSICAL MEDICINE & REHABILITATION

## 2023-01-18 PROCEDURE — 6360000002 HC RX W HCPCS: Performed by: PHYSICAL MEDICINE & REHABILITATION

## 2023-01-18 PROCEDURE — 97530 THERAPEUTIC ACTIVITIES: CPT

## 2023-01-18 PROCEDURE — 94760 N-INVAS EAR/PLS OXIMETRY 1: CPT

## 2023-01-18 PROCEDURE — 97530 THERAPEUTIC ACTIVITIES: CPT | Performed by: PHYSICAL THERAPIST

## 2023-01-18 PROCEDURE — 97110 THERAPEUTIC EXERCISES: CPT | Performed by: PHYSICAL THERAPIST

## 2023-01-18 PROCEDURE — 97116 GAIT TRAINING THERAPY: CPT | Performed by: PHYSICAL THERAPIST

## 2023-01-18 PROCEDURE — 97112 NEUROMUSCULAR REEDUCATION: CPT

## 2023-01-18 PROCEDURE — 97112 NEUROMUSCULAR REEDUCATION: CPT | Performed by: PHYSICAL THERAPIST

## 2023-01-18 PROCEDURE — 1280000000 HC REHAB R&B

## 2023-01-18 RX ADMIN — HEPARIN SODIUM 5000 UNITS: 5000 INJECTION INTRAVENOUS; SUBCUTANEOUS at 21:02

## 2023-01-18 RX ADMIN — SENNOSIDES AND DOCUSATE SODIUM 1 TABLET: 50; 8.6 TABLET ORAL at 08:21

## 2023-01-18 RX ADMIN — INSULIN LISPRO 2 UNITS: 100 INJECTION, SOLUTION INTRAVENOUS; SUBCUTANEOUS at 12:03

## 2023-01-18 RX ADMIN — METFORMIN HYDROCHLORIDE 1000 MG: 500 TABLET ORAL at 08:21

## 2023-01-18 RX ADMIN — QUETIAPINE FUMARATE 50 MG: 25 TABLET ORAL at 20:58

## 2023-01-18 RX ADMIN — ACETAMINOPHEN 325MG 650 MG: 325 TABLET ORAL at 14:13

## 2023-01-18 RX ADMIN — ATORVASTATIN CALCIUM 80 MG: 80 TABLET, FILM COATED ORAL at 20:58

## 2023-01-18 RX ADMIN — ASPIRIN 81 MG: 81 TABLET, COATED ORAL at 08:21

## 2023-01-18 RX ADMIN — MIRTAZAPINE 15 MG: 15 TABLET, FILM COATED ORAL at 20:58

## 2023-01-18 RX ADMIN — HEPARIN SODIUM 5000 UNITS: 5000 INJECTION INTRAVENOUS; SUBCUTANEOUS at 10:57

## 2023-01-18 ASSESSMENT — PAIN SCALES - GENERAL
PAINLEVEL_OUTOF10: 2
PAINLEVEL_OUTOF10: 0
PAINLEVEL_OUTOF10: 5
PAINLEVEL_OUTOF10: 3

## 2023-01-18 ASSESSMENT — PAIN DESCRIPTION - DESCRIPTORS: DESCRIPTORS: ACHING

## 2023-01-18 ASSESSMENT — PAIN DESCRIPTION - ORIENTATION: ORIENTATION: LEFT;RIGHT

## 2023-01-18 ASSESSMENT — PAIN DESCRIPTION - FREQUENCY: FREQUENCY: INTERMITTENT

## 2023-01-18 ASSESSMENT — PAIN DESCRIPTION - PAIN TYPE: TYPE: ACUTE PAIN

## 2023-01-18 ASSESSMENT — PAIN DESCRIPTION - ONSET: ONSET: ON-GOING

## 2023-01-18 ASSESSMENT — PAIN - FUNCTIONAL ASSESSMENT: PAIN_FUNCTIONAL_ASSESSMENT: ACTIVITIES ARE NOT PREVENTED

## 2023-01-18 ASSESSMENT — PAIN DESCRIPTION - LOCATION: LOCATION: HEAD

## 2023-01-18 NOTE — PLAN OF CARE
Problem: Discharge Planning  Goal: Discharge to home or other facility with appropriate resources  1/18/2023 0923 by Margarita Kuhn RN  Outcome: Progressing  Flowsheets (Taken 1/18/2023 3303)  Discharge to home or other facility with appropriate resources:   Identify barriers to discharge with patient and caregiver   Identify discharge learning needs (meds, wound care, etc)     Problem: Safety - Adult  Goal: Free from fall injury  1/18/2023 0923 by Margarita Kuhn RN  Outcome: Progressing  Flowsheets (Taken 1/18/2023 0349)  Free From Fall Injury:   Instruct family/caregiver on patient safety   Based on caregiver fall risk screen, instruct family/caregiver to ask for assistance with transferring infant if caregiver noted to have fall risk factors  Note: Pt is at risk for falls. Call light in reach. Bed in low position. Alarm on. Nonskid footwear on. Possessions in reach.   Gait belt with front wheeled walker for ambulation     Problem: Pain  Goal: Verbalizes/displays adequate comfort level or baseline comfort level  1/18/2023 0923 by Margraita Kuhn RN  Outcome: Progressing  Flowsheets (Taken 1/18/2023 5673)  Verbalizes/displays adequate comfort level or baseline comfort level: Encourage patient to monitor pain and request assistance     Problem: Neurosensory - Adult  Goal: Achieves stable or improved neurological status  1/18/2023 0923 by Margarita Kuhn RN  Outcome: Progressing  Flowsheets (Taken 1/18/2023 0226)  Achieves stable or improved neurological status:   Assess for and report changes in neurological status   Maintain blood pressure and fluid volume within ordered parameters to optimize cerebral perfusion and minimize risk of hemorrhage     Problem: Neurosensory - Adult  Goal: Achieves maximal functionality and self care  1/18/2023 0923 by Margarita Kuhn RN  Outcome: Progressing  Flowsheets (Taken 1/18/2023 0923)  Achieves maximal functionality and self care:   Encourage and assist patient to increase activity and self care with guidance from physical therapy/occupational therapy   Encourage visually impaired, hearing impaired and aphasic patients to use assistive/communication devices     Problem: Skin/Tissue Integrity  Goal: Absence of new skin breakdown  Description: 1. Monitor for areas of redness and/or skin breakdown  2. Assess vascular access sites hourly  3. Every 4-6 hours minimum:  Change oxygen saturation probe site  4. Every 4-6 hours:  If on nasal continuous positive airway pressure, respiratory therapy assess nares and determine need for appliance change or resting period.   1/18/2023 2142 by Ronit Olivo RN  Outcome: Progressing     Problem: ABCDS Injury Assessment  Goal: Absence of physical injury  1/18/2023 0923 by Ronit Olivo RN  Outcome: Progressing  Flowsheets  Taken 1/18/2023 0923  Absence of Physical Injury: Implement safety measures based on patient assessment  Taken 1/18/2023 0908  Absence of Physical Injury: Implement safety measures based on patient assessment

## 2023-01-18 NOTE — CARE COORDINATION
Sent request for TALL cane, single point to [facility] to request delivery to hospital for his discharge on Saturday, 1-. Faxed request as directed to:  [phone].   [place], [place]     Case Management   [phone]    1/18/2023  11:46 AM

## 2023-01-18 NOTE — PROGRESS NOTES
Patient admitted to rehab with acute ischemic right PCA stroke. A/Ox4. Transfers with one assist using gait belt for ambulation. Mobility restrictions: WBAT. On regular 5 carb, selective diet, tolerating well, needs encouragement to eat after set up. Medications taken whole with thin liquids. On aspirin and heparin for DVT prophylaxis. Skin: intact. Oxygen: room air. LDA: none. Has been continent of bowel and  of bladder. LBM 1/14. Chair/bed alarms in use and call light in reach. Will monitor for safety.

## 2023-01-18 NOTE — CARE COORDINATION
Met with patient to review Team Conference of yesterday. Informed him of DC date of Saturday, 1-. Informed him of recommendation for Home care for RN and PT only. He was agreeable  Informed him that his insurance will set this up for his as is their procedure and that I will sent them MD orders for it. Informed him of recommendation for TALL straight cane and that I have send the referral to his insurance company as directed by the insurance company for delivery to hospital before his release on Saturday. Discussion held regarding [place] paperwork and that Dr Marko Cohen has completed them and I faxed to his employer. He gives permission for me to call his  where he lives in [place] [facility] [place] to inform he will need to be off work for several weeks before returning to his job. His number is:   Neil Peguero:  [phone]  Call placed to Neil Peguero to review DC plan and that he will not be able to work for a while and to clear that he can have home care. Neil Peguero welcomes him to return to group home, he is able to have visiting home care and he does not need to work until MD clears him.   Tanja Bowman, [place]     Case Management   [phone]    1/18/2023  1:50 PM

## 2023-01-18 NOTE — PROGRESS NOTES
Darrel Vee called and stated Santy Queen was sending monitor in the mail to him at [facility]. Call placed to Shaneka Franco from [address] [place] [address] and verified this and his follow up appointment with Dr Beatriz Powers for February 7 at [facility]. . [phone].

## 2023-01-18 NOTE — PROGRESS NOTES
Spoke to Zahida Frey from [facility] about getting cardiac monitor for discharge.   She states they will drop it off before discharge

## 2023-01-18 NOTE — PROGRESS NOTES
This is a 81 y.o.  male admitted on 1/12/2023 with Acute ischemic right PCA stroke. Pt A&O X4. HR regular. Lungs sounds clear. Denies any chest pain or shortness of breath. Abd soft and nontender. Active bowel sounds. Last BM 01/14/23. Continent of bowel & bladder. Depends on. Denies any abd discomfort. Transfers with walker X1. Pills whole with thin. HS medication given. Tolerated well. Call light in reach. Patient instructed to call if there is any needs or changes.   Electronically signed by Nadia Montez RN on 1/17/2023 at 11:33 PM

## 2023-01-18 NOTE — PROGRESS NOTES
Speech Language Pathology  ACUTE REHAB UNIT  SPEECH/LANGUAGE PATHOLOGY      [x] Daily  [] Weekly Care Conference Note  [x] Discharge    Patient:Popeye [employer]  VPR:[id number]  Rehab Dx/Hx: Acute ischemic right PCA stroke ([facility] [address] [I63.531]    Precautions: FAll risk; Reports of visual problems  Home situation: Has roommates  ST Dx: [] Aphasia  [] Dysarthria  [] Apraxia   [] Oropharyngeal dysphagia [x] Cognitive   Impairment  [] Other:   Initial Speech Therapy Assessment Diagnosis:   Speech Therapy Diagnosis  Cognitive Diagnosis: Pt demonstrated functional temporal orientation; attention; working memory and STM ;and concrete VPS/PS and reasoning on testing. Pt wtih deficits on testing performance for complex reasoning . Further assessment of executive funciton unless otherwise notified  Speech Diagnosis: Audible and intelligible connected speech. Communication Diagnosis: [place] language skills appear intact. Pt wtih breakdonw with complex processing (ie extra time or assist). This may be baseline. Pt was functional in verbal expression of needs/wants; CFN; concept explanations/event explanations. Date of Admit: 1/12/2023  Room #: E8S-2086/9364-23  Date: 1/18/2023       Current functional status (updated daily):         Current Diet Order:ADULT DIET; Regular; 5 carb choices (75 gm/meal)  ADULT ORAL NUTRITION SUPPLEMENT; Breakfast, Lunch, Dinner; Diabetic Oral Supplement     Behavior: [x] Alert  [x] Cooperative  [x]  Pleasant  [] Confused  [] Agitated  [] Uncooperative  [] Distractible [] Motivated  [] Self-Limiting [] Anxious  [] Other:  Endurance:  [x] Adequate for participation in SLP sessions  [] Reduced overall  [] Lethargic  [] Other:  Safety: [] No concerns at this time  [] Reduced insight into deficits  []  Reduced safety awareness [] Not following call light procedures  [] Unable to Assess  [x] Other:Potential concern for visual deficits and mobility.  Thus far with language tasks (reading/writing/use of information on 8x11 page); pt spontaneously scanning left    Barriers toward progress: None unless caregiver support or assist           Date: 1/18/2023      Tx session 1 Tx session 2   Total Timed Code Min   SLP Individual Minutes  Time In: 1430  Time Out: 1455  Minutes: 25  Coded treatment time 25  N/a   Group Treatment Minutes 0 0   Co-Treat Minutes 0 0   Variance/Reason:  N/a    Pain denied    Pain Intervention [] RN notified  [] Repositioned  [] Intervention offered and patient declined  [] N/A  [] Other: [] RN notified  [] Repositioned  [] Intervention offered and patient declined  [] N/A  [] Other:   Subjective     Pt seen in treatment office  Good effort     Objective:  Goals     Cognitive-communication goalsGoals: Pt goal is to get back home   Therapy worked toward pt goals of IND with cognitive-communicaiton skills for concrete and mild complex ADV DL situations N/A       Goal 1: Pt will partiicpatein ongoing assessment of cognitive-linguistic skills for executive function unless otherwise notified       Post team meeting additional testing for visuo spatial testing; functional PS/VPS  Goal met  Visuo spatial for:  Copying 4-7 line drawings: [employer]  Putting numbers on a clock and hands on clock to depict time:WFL  Using varied colored chips to duplicate 16 pc design: concrete and mild complex: IND; complex: intermittent assist otherwise IND 14/16   Recall of daily events/new information;  Oriented to target d/c date  Oriented to daily therapies and   Oriented to recommended adaptive equipment goal met \"cane\"  Oriented to SLP being discontinued     N/A   Other areas targeted:     Education:   Pt ed in recommendation for d/c of skilled SLP services.      Safety Devices: [] Call light within reach  [] Chair alarm activated  [] Bed alarm activated  [x] Other: transport returned pt to room [] Call light within reach  [] Chair alarm activated  [] Bed alarm activated  [] Other: Progress Assessment: SLP discontinued at this time   Plan:    Continued Tx Upon Discharge: ?    [] Yes [x] No [] TBD based on progress while on ARU [] Vital Stim indicated [] Other:   Estimated discharge date: SLP d/c 1/18/2023; hospital d/c targeted for 1/21/2023   Discharge recommendations:   [] Home independently  [x] Home with assistance PRN due to physical deficits[]  24 hour supervision  [] ECF [] Other:     Additional information:     Interventions used during Rehab Stay:  [] Speech/Language Treatment  [] Instruction in HEP [] Group [] Dysphagia Treatment [x] Cognitive Treatment   [] Other:          Electronically Signed by    Jeffery Small. Farhan,MS,CCC,SLP [id number]  Speech and Language Pathologist

## 2023-01-18 NOTE — PROGRESS NOTES
Department of Physical Medicine & Rehabilitation  Progress Note    Patient Identification:  Douglas Parmar  [id number]  : 1950  Admit date: 2023    Chief Complaint: Acute ischemic right PCA stroke ([address]    Subjective:   No acute events overnight. Patient seen this afternoon resting in bed. Reports therapy continues to go well and feels he is making progress. Provided education on hydrating with water rather than pop. Labs reviewed. ROS: No f/c, n/v, cp     Objective:  Patient Vitals for the past 24 hrs:   BP Temp Temp src Pulse Resp SpO2   23 1154 -- -- -- 73 -- --   23 0956 -- -- -- 71 16 96 %   23 0820 110/75 98.2 °F (36.8 °C) Oral 77 16 98 %   23 0600 116/73 98.2 °F (36.8 °C) Oral 50 -- 99 %   23 0213 109/72 97.7 °F (36.5 °C) Oral 54 -- 100 %   23 1912 137/81 98.5 °F (36.9 °C) Oral 60 16 99 %   23 1632 120/71 97.7 °F (36.5 °C) Oral 51 17 99 %   23 1406 138/76 -- -- 51 -- 98 %     Const: Alert. No distress, pleasant. HEENT: Normocephalic, atraumatic. Normal sclera/conjunctiva. MMM. CV: Regular rhythm, rate ~60  Resp: No respiratory distress. Lungs CTAB. Abd: Soft, nontender, nondistended, NABS+   Ext: No edema. Neuro: Alert, oriented, appropriately interactive. Left VF cut. Psych: Cooperative, appropriate mood and affect    Laboratory data: Available via EMR.    Last 24 hour lab  Recent Results (from the past 24 hour(s))   POCT Glucose    Collection Time: 23  4:12 PM   Result Value Ref Range    POC Glucose 205 (H) 70 - 99 mg/dl    Performed on ACCU-CHEK    POCT Glucose    Collection Time: 23  9:51 PM   Result Value Ref Range    POC Glucose 199 (H) 70 - 99 mg/dl    Performed on ACCU-CHEK    POCT Glucose    Collection Time: 23  6:59 AM   Result Value Ref Range    POC Glucose 170 (H) 70 - 99 mg/dl    Performed on ACCU-CHEK    POCT Glucose    Collection Time: 23 11:09 AM   Result Value Ref Range    POC Glucose 281 (H) 70 - 99 mg/dl    Performed on ACCU-CHEK        Therapy progress:  Physical therapy:  Bed Mobility:  Overall Assistance Level: Independent  Sit>supine:  Assistance Level: Independent  Supine>sit:  Assistance Level: Independent  Transfers:  Surface: From bed, From chair with arms, To chair with arms  Device: Cane (simulated)  Sit>stand:  Assistance Level: Stand by assist, Supervision  Stand>sit:  Assistance Level: Stand by assist, Supervision  Bed<>chair  Technique: Stand pivot  Assistance Level: Stand by assist, Supervision  Skilled Clinical Factors: without wheeled walker. Stand Pivot:     Lateral transfer:     Car transfer:  Assistance Level: Stand by assist  Ambulation:  Surface: Level surface, Carpet  Device: Cane (simulated)  Distance: 400' with figure 8s on carpet,  Activity: Within Unit, Within Room, Transport Items  Activity Comments: patient demonstrating difficulty with left visual field  Additional Factors: Verbal cues, Increased time to complete  Assistance Level: Stand by assist  Gait Deviations: Slow karol, Path deviations  Skilled Clinical Factors: Pt amb without device in kitchen and ADL apt carrying a bag of laundry, bumped the laundry into several objects and doorway  Stairs:  Stair Height: 6''  Device: Bilateral handrails  Number of Stairs: 24 carrying a bag of laundry  Additional Factors: Verbal cues, Increased time to complete  Assistance Level: Stand by assist  Curb:  Curb Height: 6''  Device: Cane (simulated)  Number of Curbs: 1  Additional Factors: Verbal cues, Increased time to complete  Wheelchair:     Assessment:  Assessment: Mr. \"Bill\" Elaine Mckeon is tolerating high level balance activities well and ranges from CGA-SBA. He does have some minor LOB, but he can recover with CGA. He does have diffiuclty attending to left side of environment at times and will bump or brush into the wall on the R.  He was able to carry a bag of laundry through the kitchen, bedroom and negotiated 2 flights of stairs since the laundry is in the basement. He demonstrates good endurance but does fatigue with high level balance activities. Patient is below baseline and will benefit from continued skilled therapy for strengthening, balance training, and neuro re-education. Anticipate discharge on Sat 1/21/23. Activity Tolerance: Patient tolerated treatment well  Discharge Recommendations: Continue to assess pending progress, Patient would benefit from continued therapy after discharge      Occupational therapy:   Feeding  Assistance Level: Independent  Skilled Clinical Factors: poor appetite, finally agreeable to eat a bowl of cereal  Grooming/Oral Hygiene  Assistance Level: Independent  Skilled Clinical Factors: alternated sitting & standing  to brush teeth & shave due to dizziness  UE Bathing  Assistance Level: Modified independent  Skilled Clinical Factors: seated on shower chair, he was able to wash rinse & dry UB IND'ly after set up  LE Bathing  Assistance Level: Supervision  Skilled Clinical Factors: close Supervision only while in standing to wash rinse & dry buttocks/backside, no LOB however safety concerns d/t dizziness, sat to dry off his feet (didn't use much soap or washcloth)  UE Dressing  Assistance Level: Set-up  Skilled Clinical Factors: able to doff/don shirt while seated; OT provided w/ set up  LE Dressing  Assistance Level: Supervision  Skilled Clinical Factors: close Supervision only while standing to manage clothing  over hips/buttocks, used sink or GB for support; mild intermittent dizziness w/ transitional movmts  Putting On/Taking Off Footwear  Assistance Level:  Independent  Skilled Clinical Factors: crossed legs to doff/don non skid socks, also able to don his shoes without difficulty  Toileting  Assistance Level: Modified independent  Skilled Clinical Factors: stood to urinate, IND w/ clothing management,used GB prn  Assessment:  Assessment: pt is making steady gains w/ OT intervention, has met 1 out of 5 STGs and is making progress towards being IND. He is able to ambulate @ therapy gym w/ CLOSE SBA, bumps into objects on L side despite having cues to scan environment prior to geting up. Stood x 5-6 minutes during [email or web address] game--IND w/ hitting target but required light CGA when bending over to retrieve bean bags from floor & placing into bucket. Pt stood another 5-6 minutes during pipefitting activity--did not see entire picture & struggled to find pieces when on L side of table. Cues to turn head & eyes to scan far L. Anticipate DC at EOW; he lives at [facility] and will need to be completely IND; he does not want any TSF or shower chair, reports the home is not accessible. Pt is not able to return to work at this time due to balance, neglect & vision issues. Recommend home OT & then transition to BVR/work hardening program  Activity Tolerance: Patient tolerated treatment well  Discharge Recommendations: Outpatient OT  Factors Affecting Discharge: L visual field cut, neglect    Speech therapy:    Speech Therapy Diagnosis  Cognitive Diagnosis: Pt demonstrated functional temporal orientation; attention; working memory and STM ;and concrete VPS/PS and reasoning on testing. Pt wtih deficits on testing performance for complex reasoning . Further assessment of executive funciton unless otherwise notified  Speech Diagnosis: Audible and intelligible connected speech. Communication Diagnosis: [place] language skills appear intact. Pt wtih breakdonw with complex processing (ie extra time or assist). This may be baseline. Pt was functional in verbal expression of needs/wants; CFN; concept explanations/event explanations.        PT  Position Activity Restriction  Other position/activity restrictions: reg diet  Objective     Sit to Stand: Contact guard assistance  Stand to Sit: Contact guard assistance  Bed to Chair: Contact guard assistance  Device: Rolling Walker, No Device  Assistance: Contact guard assistance  Distance: 50', 150', 200'. 200' x2 w/o AD  OT  PT Equipment Recommendations  Equipment Needed: Yes  Mobility Devices: Canes  Cane: Straight Cane (tall the pt is 6'8\")  Other: Will continue to assess  Equipment Used: Grab bars  Toilet Transfers Comments: close SB/CBA using GB, mild posterior lean & to L  Assessment        SLP          Body mass index is 18.02 kg/m². Rehabilitation Diagnosis:   Stroke, 1.1, Left Body (R Brain)        Assessment and Plan:     Impairments:left hemiparesis, left VF deficit, decreased balance, cognition     Acute ischemic right PCA stroke with hemorrhagic transformation  -Localization: P2 occlusion affecting R temporal and occipital lobes, thalamus  -Etiology: concern for embolic source  -Secondary ppx: ASA, statin  -Telemetry while inpatient, then cardiac event monitor  -PT/OT/SLP     DM2 with hyperglycemia  -Hgb A1C 10  -metformin, ISS -- monitor trend, improving     dCHF  -Daily wt  -BP and HR unable to tolerate bb, acei     Sinus bradycardia  -Asymptomatic? Patient does report intermittent dizziness over the past 2-3 years.   -Dizziness seems to be coinciding more with mild orthostatic hypotension. Encouraging increased po intake.  -Telemetry with intermittent 1st degree AVB     SHELLIE vs CKD  -Cr 1.3-1.5 at [facility]  -Avoid nephrotoxins, renally dose meds  -Monitor renal function     Right frontal convexity Meningioma  -Incidental finding  -Plan for f/u with Dr. Stephen Rosenbaum as outpatient    Depression, low appetite  -Started Remeron     Bladder   -High risk retention   -Monitor PVRs, SC prn >300cc     Bowel   -High risk constipation   -senna+colace BID, PRN miralax, MoM, and bisacodyl supp. Safety   -fall precautions     Pain control  -acetaminophen prn     DVT ppx  -heparin    Rehab Progress: Making progress. Working on balance, functional mobility, compensatory strategies, cognition. Limited mostly by left VF cut.    Anticipated Dispo: home to [facility]: [place] PT, OT, RN  DME: Tall cane  JULY: 1/21      Ana Laura Lobato.  Claudean So, MD 1/18/2023, 1:08 PM

## 2023-01-18 NOTE — PROGRESS NOTES
Occupational Therapy  Facility/Department: [facility]  Rehabilitation Occupational Therapy Daily Treatment Note    Date: 23  Patient Name: Gabriela Buenrostro       Room: Y6L-8127/9301-19  MRN: [id number]  Account: [de-identified]   : 1950  (81 y.o.) Gender: male              PM session: Met in therapy dept, he just finished PT session. Pt agreeable for car t/f & practicing on/off elevators to gift shop. He needed close SB to CGA to ambulate to/from therapy<>ortho gym no AD--does not follow directions well & seems to drift over to R side, R foot almost scissored several times in open spaces. He required SBA to enter and exit car, held onto doorway which moved slightly when sitting down. He ambulated to/from therapy gym<>gift shop using elevators, no AD--pt had several episodes of R sided LOB, cues to scan to L; while in gift shop, tries to hold onto the displays for support but they were movable. He seemed unconcerned by LOB, did not display any righting reaction. Pt is reporting headache and mild dizziness, his pulse ox was 98%, HR 74. OT notified RN regarding headache. He completed line dissection test--he was 100% accurate to rochelle middle of each line on paper. H completed 1 set of 20 for UE strengthening using 4 lb wts. OT had to cue pt to stop activity. Pt taken to Speech session next. Tx time: 45 min, cont w/ POC. Brandon Morse, OTR/L #[id number]       Past Medical History:  has a past medical history of A-fib ([facility] [address] CHF (congestive heart failure) ([facility] DM2 (diabetes mellitus, type 2) ([facility] [address] [facility] and Meningioma ([facility] Past Surgical History:   has no past surgical history on file.     Restrictions  Restrictions/Precautions: Fall Risk  Other position/activity restrictions: reg diet    Subjective  Subjective: met in therapy gym, he is on target for DC on Saturday, will continue w/ teley upon DC per  Cardiologist request  Restrictions/Precautions: Fall Risk             Objective     Cognition  Overall Cognitive Status: Exceptions  Memory: Decreased short term memory  Safety Judgement: Decreased awareness of need for safety  Insights: Decreased awareness of deficits  Cognition Comment: pt is cooperative, mild word finding & mild slow processing, has L visual field cut & dizziness which impacts safety w/ transfers & ADLs  Orientation  Overall Orientation Status: Within Normal Limits         ADL             Functional Mobility  Activity: To/From therapy gym  Assistance Level: Stand by assist  Skilled Clinical Factors: ambulated in therapy gym during corn hole bean bag toss activity; he required CLOSE SB/light CGA when bending over to retrieve bags from floor & place into bucket (pt is 6'8\" --has mild dizziness when bending over to reach items from floor--was shown reacher in previous session to reduce fall risk). He also ambulated thru therapy gym to simulated shower stall--bumped into objects on L twice despite having VCs to scan far L before getting up. Pt able to step over 6 inch threshold w/ SBA & to sit on shower chair, SBA to exit for cues to attend L side  Transfers  Surface: Wheelchair;From chair with arms; To chair with arms  Device:  (no AD)  Sit to Stand  Assistance Level: Stand by assist  Skilled Clinical Factors: close SBA moves swiftly, no AD, mild unsteadiness & reports of dizziness;  cues to visually locate items on L side which impacts fall risk  Stand to Sit  Assistance Level: Stand by assist  Bed To/From Chair  Assistance Level: Stand by assist  Skilled Clinical Factors: close SBA to ambulate x 20 ft no AD from w/c <> reg chair, mild dizziness reported, also has L neglect/visual field loss which impacts balance   OT Exercises  Exercise Treatment: used 6 lb medicine ball for UE & core strengthening--completed 1 set of 20 for chest presses, bicep curls, horizontal abduct/add and PNF patterns  Static Standing Balance Exercises: he stood x 5-6 minutes during pipefitting activity at elevated table.  He needed SBA while in standing, mild dizziness reported; he struggled w/ following picture pattern during pipefitting--missed picture & pieces on L side  Dynamic Standing Balance Exercises: stood x 5 minutes to toss bean bags during cornhole game--overall needed SBA --ambulated over to board to  bean bags from floor  but needed light CGA, mild episodes of dizziness reports  Postural Correction Exercises: abdominal squeezes, shld shrugs x 20 to support back while standing & during fxl mob     Assessment  Assessment  Assessment: pt is making steady gains w/ OT intervention, has met 1 out of 5 STGs and is making progress towards being IND. He is able to ambulate @ therapy gym w/ CLOSE SBA, bumps into objects on L side despite having cues to scan environment prior to geting up. Stood x 5-6 minutes during cornhole game--IND w/ hitting target but required light CGA when bending over to retrieve bean bags from floor & placing into bucket. Pt stood another 5-6 minutes during pipefitting activity--did not see entire picture & struggled to find pieces when on L side of table. Cues to turn head & eyes to scan far L. Anticipate DC at EOW; he lives at [facility] and will need to be completely IND; he does not want any TSF or shower chair, reports the home is not accessible. Pt is not able to return to work at this time due to balance, neglect & vision issues.  Recommend home OT & then transition to BVR/work hardening program  Activity Tolerance: Patient tolerated treatment well  Discharge Recommendations: Outpatient OT  Factors Affecting Discharge: L visual field cut, neglect  OT Equipment Recommendations  Other: denies need for TSF or shower chair, wants to try a cane  Safety Devices  Safety Devices in place: Yes  Type of devices: Gait belt    Patient Education  Education  Education Given To: Patient  Education Provided: Safety;Transfer Training  Education Provided Comments: educated on purpose of pausing during transitional movmts due to episodes of dizziness  Education Method: Demonstration;Verbal  Barriers to Learning: Vision;Cognition  Education Outcome: Verbalized understanding;Continued education needed  Skilled Clinical Factors: mild impulsiveness, has visual field cut/neglect L side, lacks insight into deficits    Plan  Occupational Therapy Plan  Times Per Week: 5-6  Times Per Day: Twice a day  Specific Instructions for Next Treatment: DC Saturday to [address] [facility], denies need for [facility] OT services however recommend outpt OT for work hardening/BVR  Current Treatment Recommendations: Strengthening;Balance training;Functional mobility training;Gait training;Equipment evaluation, education, & procurement;Self-Care / ADL;Return to work related activity;Home management training  Additional Comments: no driving due to vision/neglect issues    Goals  Patient Goals   Patient goals : \"get back to normal & go back to [employer]"  Short Term Goals  Time Frame for Short Term Goals: by 5 days pt will complete  Short Term Goal 1: Grooming IND'ly while standing at sink  Short Term Goal 2: UB & LB dressing IND'ly  Short Term Goal 3: toileting IND'ly--MET  Short Term Goal 4: household transfers IND'ly  Short Term Goal 5: standing balance x 20 minutes during IADLs, pt is --will need to cook, clean & do laundry  Long Term Goals  Time Frame for Long Term Goals : by 5 days pt will complete  Long Term Goal 1: increase L  strength x 2 lbs to be IND w/ opening containers, lids  Long Term Goal 2: address vision further using vision disk, eye chart, scaning and tracking w/ 100% accuracy                Therapy Time   Individual Concurrent Group PM-treatment   Time In 1115      1345   Time Out 1200      1430   Minutes 45      45   Timed Code Treatment Minutes: [facility], OTR/lL#[id number]

## 2023-01-18 NOTE — PLAN OF CARE
Problem: Pain  Goal: Verbalizes/displays adequate comfort level or baseline comfort level  1/17/2023 2326 by Natalya Mccoy RN  Outcome: Progressing  1/17/2023 1327 by Lyndsay Christensen RN  Outcome: Progressing  1/17/2023 1259 by Lyndsay Christensen RN  Outcome: Progressing     Problem: Safety - Adult  Goal: Free from fall injury  1/17/2023 2326 by Natalya Mccoy RN  Outcome: Progressing  1/17/2023 1327 by Lyndsay Christnesen RN  Outcome: Progressing  1/17/2023 1259 by Lyndsay Christensen RN  Outcome: Progressing  4 [address] Barry Tilley (Taken 1/17/2023 1256)  Free From Fall Injury: Instruct family/caregiver on patient safety     Problem: Skin/Tissue Integrity - Adult  Goal: Skin integrity remains intact  1/17/2023 2326 by Natalya Mccoy RN  Outcome: Progressing  1/17/2023 1259 by Lyndsay Christensen RN  Outcome: Progressing  Flowsheets  Taken 1/17/2023 1256  Skin Integrity Remains Intact: Monitor for areas of redness and/or skin breakdown  Taken 1/17/2023 0750  Skin Integrity Remains Intact: Monitor for areas of redness and/or skin breakdown  Goal: Incisions, wounds, or drain sites healing without S/S of infection  1/17/2023 2326 by Natalya Mccoy RN  Outcome: Progressing  1/17/2023 1259 by Lyndsay Christensen RN  Outcome: Progressing  Goal: Oral mucous membranes remain intact  1/17/2023 2326 by Natalya Mccoy RN  Outcome: Progressing  1/17/2023 1259 by Lyndsay Christensen RN  Outcome: Progressing

## 2023-01-18 NOTE — PROGRESS NOTES
Physical Therapy  Facility/Department: [facility]  Rehabilitation Physical Therapy Treatment Note    NAME: Otoniel Albrecht  : 1950 (81 y.o.)  MRN: [id number]  CODE STATUS: Full Code    Date of Service: 23    Restrictions:  Restrictions/Precautions: Fall Risk  Position Activity Restriction  Other position/activity restrictions: reg diet     SUBJECTIVE  Subjective  Subjective: Pt is still not sleeping well. OBJECTIVE  Cognition  Overall Cognitive Status: Exceptions  Memory: Decreased short term memory  Safety Judgement: Decreased awareness of need for safety  Insights: Decreased awareness of deficits  Cognition Comment: pt is cooperative, mild word finding & mild slow processing, has L visual field cut & dizziness which impacts safety w/ transfers & ADLs  Orientation  Overall Orientation Status: Within Normal Limits    Functional Mobility  Balance  Sitting Balance: Independent  Standing Balance: Stand by assistance (with and without UE support)  Transfers  Surface: From bed;From chair with arms; To chair with arms  Device: Cane (simulated)  Sit to Stand  Assistance Level: Stand by assist;Supervision  Stand to Sit  Assistance Level: Stand by assist;Supervision  Bed To/From Chair  Technique: Stand pivot  Assistance Level: Stand by assist;Supervision  Skilled Clinical Factors: without wheeled walker. Environmental Mobility  Ambulation  Surface: Level surface; Carpet  Device: Cane (simulated)  Distance: 400' with figure 8s on carpet,  Activity: Within Unit; Within Room;Transport Items  Activity Comments: patient demonstrating difficulty with left visual field  Additional Factors: Verbal cues; Increased time to complete  Assistance Level: Stand by assist  Gait Deviations: Slow karol; Path deviations  Skilled Clinical Factors: Pt amb without device in kitchen and ADL apt carrying a bag of laundry, bumped the laundry into several objects and doorway  Stairs  Stair Height: 6''  Device: Bilateral handrails  Number of Stairs: 24 carrying a bag of laundry  Additional Factors: Verbal cues; Increased time to complete  Assistance Level: Stand by assist  Curb  Curb Height: 6''  Device: Cane (simulated)  Number of Curbs: 1  Additional Factors: Verbal cues; Increased time to complete    PT Exercises  Dynamic Standing Balance Exercises: Patient stood at wall and brought arms above head and bounced and caught ball off the wall for 2 minutes. Bilateral UE fatigued and needed a rest break, but no LOB occured and only lost control of the ball 1 time. Standing ball toss x 3 min, no LOB reaching out of his ABEBA. PM Session:  Dynamic Standing Balance Exercises: Had the pt stand on a 1/2 foam roll and work on balance while moving from PF to DF, he needed intermittent UE support for balance. Tried to use the balance but it was too tall and challenged his balance too much so it was discontinued. Standing Open/Closed Kinetic Chain Exercises: Mini Squats, marches, heel/toe raises, ABD/ADD, hip ext and knee flexion alternating x20 reps with cues for form. Step ups and lat step ups with 4\" block x10. His balance was challanged more by alternating than doing same leg exs. Asked him to hold onto the // bars as little as possible. The pt was able to tolerate 10 minutes of BLE strengthening, conditioning and reciprocal movement on the NuStep with Seat and UEs at 15, workload 5    ASSESSMENT/PROGRESS TOWARDS GOALS    Assessment  Assessment: MrAundrea \"Bill\" Alli Hyman is tolerating high level balance activities well and ranges from CGA-SBA. He does have some minor LOB, but he can recover with CGA. He does have diffiuclty attending to left side of environment at times and will bump or brush into the wall on the R. He was able to carry a bag of laundry through the kitchen, bedroom and negotiated 2 flights of stairs since the laundry is in the basement. He demonstrates good endurance but does fatigue with high level balance activities.  Patient is below baseline and will benefit from continued skilled therapy for strengthening, balance training, and neuro re-education. Anticipate discharge on Sat 1/21/23 with home PT. Activity Tolerance: Patient tolerated treatment well  Discharge Recommendations: Continue to assess pending progress; Patient would benefit from continued therapy after discharge  PT Equipment Recommendations  Equipment Needed: Yes  Mobility Devices: Canes  Cane: Straight Cane ([address])  Other: [place] Amber needs to be 44 1/2\" tall-pt is 6'8\" tall    Goals  Patient Goals   Patient Goals : I want to go back to work  Short Term Goals  Time Frame for Short Term Goals: 1 week  Short Term Goal 1: I bed mobility-met  Short Term Goal 2: Mod I for all functional transfers  Short Term Goal 3: Amb 200' with Mod I and LRAD  Short Term Goal 4: Up/down curb with mod I  Short Term Goal 5: Up/down 12 steps with mod I    PLAN OF CARE/SAFETY  Physcial Therapy Plan  General Plan:  minutes of therapy at least 5 out of 7 days a week  Days Per Week: 5 Days  Hours Per Day: 1.5 hours  Therapy Duration: 4-7 Days  Current Treatment Recommendations: Strengthening;Balance training;Functional mobility training;Transfer training; Endurance training;Gait training;Stair training;Neuromuscular re-education; Return to work related activity;Home exercise program;Safety education & training;Patient/Caregiver education & training;Equipment evaluation, education, & procurement; Therapeutic activities  Safety Devices  Type of Devices: All fall risk precautions in place;Gait belt;Left in chair;Patient at risk for falls    EDUCATION  Education  Education Given To: Patient  Education Provided: Plan of Care;Transfer Training;Mobility Training;Equipment; Safety  Education Provided Comments: importance of attending to left side of environment when moving, discussed the importance of nutrition on healing, he hasn't been eating much since he's been here  Education Method: Verbal;Demonstration  Barriers to Learning: None  Education Outcome: Verbalized understanding    Therapy Time   Individual Concurrent Group Co-treatment   Time In 0815         Time Out 0900         Minutes 45           Timed Code Treatment Minutes: [address] Time     Individual Co-treatment   Time In 1300     Time Out 3360     1106 Noel Rene, PT, 01/18/23 at 1:35 PM

## 2023-01-19 LAB
ANION GAP SERPL CALCULATED.3IONS-SCNC: 10 MMOL/L (ref 3–16)
BASOPHILS ABSOLUTE: 0 K/UL (ref 0–0.2)
BASOPHILS RELATIVE PERCENT: 0.5 %
BUN BLDV-MCNC: 15 MG/DL (ref 7–20)
CALCIUM SERPL-MCNC: 9.7 MG/DL (ref 8.3–10.6)
CHLORIDE BLD-SCNC: 99 MMOL/L (ref 99–110)
CO2: 26 MMOL/L (ref 21–32)
CREAT SERPL-MCNC: 1.3 MG/DL (ref 0.8–1.3)
EOSINOPHILS ABSOLUTE: 0.2 K/UL (ref 0–0.6)
EOSINOPHILS RELATIVE PERCENT: 2.3 %
GFR SERPL CREATININE-BSD FRML MDRD: 59 ML/MIN/{1.73_M2}
GLUCOSE BLD-MCNC: 171 MG/DL (ref 70–99)
GLUCOSE BLD-MCNC: 177 MG/DL (ref 70–99)
GLUCOSE BLD-MCNC: 181 MG/DL (ref 70–99)
GLUCOSE BLD-MCNC: 215 MG/DL (ref 70–99)
GLUCOSE BLD-MCNC: 310 MG/DL (ref 70–99)
HCT VFR BLD CALC: 41.1 % (ref 40.5–52.5)
HEMOGLOBIN: 13.8 G/DL (ref 13.5–17.5)
LYMPHOCYTES ABSOLUTE: 2.3 K/UL (ref 1–5.1)
LYMPHOCYTES RELATIVE PERCENT: 31 %
MCH RBC QN AUTO: 29.2 PG (ref 26–34)
MCHC RBC AUTO-ENTMCNC: 33.6 G/DL (ref 31–36)
MCV RBC AUTO: 86.9 FL (ref 80–100)
MONOCYTES ABSOLUTE: 0.6 K/UL (ref 0–1.3)
MONOCYTES RELATIVE PERCENT: 8.5 %
NEUTROPHILS ABSOLUTE: 4.2 K/UL (ref 1.7–7.7)
NEUTROPHILS RELATIVE PERCENT: 57.7 %
PDW BLD-RTO: 13.6 % (ref 12.4–15.4)
PERFORMED ON: ABNORMAL
PLATELET # BLD: 269 K/UL (ref 135–450)
PMV BLD AUTO: 7.3 FL (ref 5–10.5)
POTASSIUM REFLEX MAGNESIUM: 4.1 MMOL/L (ref 3.5–5.1)
RBC # BLD: 4.73 M/UL (ref 4.2–5.9)
SODIUM BLD-SCNC: 135 MMOL/L (ref 136–145)
WBC # BLD: 7.3 K/UL (ref 4–11)

## 2023-01-19 PROCEDURE — 97110 THERAPEUTIC EXERCISES: CPT

## 2023-01-19 PROCEDURE — 80048 BASIC METABOLIC PNL TOTAL CA: CPT

## 2023-01-19 PROCEDURE — 6360000002 HC RX W HCPCS: Performed by: PHYSICAL MEDICINE & REHABILITATION

## 2023-01-19 PROCEDURE — 97530 THERAPEUTIC ACTIVITIES: CPT | Performed by: PHYSICAL THERAPIST

## 2023-01-19 PROCEDURE — 1280000000 HC REHAB R&B

## 2023-01-19 PROCEDURE — 97535 SELF CARE MNGMENT TRAINING: CPT

## 2023-01-19 PROCEDURE — 97110 THERAPEUTIC EXERCISES: CPT | Performed by: PHYSICAL THERAPIST

## 2023-01-19 PROCEDURE — 97116 GAIT TRAINING THERAPY: CPT | Performed by: PHYSICAL THERAPIST

## 2023-01-19 PROCEDURE — 97129 THER IVNTJ 1ST 15 MIN: CPT

## 2023-01-19 PROCEDURE — 85025 COMPLETE CBC W/AUTO DIFF WBC: CPT

## 2023-01-19 PROCEDURE — 94760 N-INVAS EAR/PLS OXIMETRY 1: CPT

## 2023-01-19 PROCEDURE — 36415 COLL VENOUS BLD VENIPUNCTURE: CPT

## 2023-01-19 PROCEDURE — 6370000000 HC RX 637 (ALT 250 FOR IP): Performed by: PHYSICAL MEDICINE & REHABILITATION

## 2023-01-19 RX ADMIN — SENNOSIDES AND DOCUSATE SODIUM 1 TABLET: 50; 8.6 TABLET ORAL at 20:46

## 2023-01-19 RX ADMIN — INSULIN LISPRO 1 UNITS: 100 INJECTION, SOLUTION INTRAVENOUS; SUBCUTANEOUS at 11:44

## 2023-01-19 RX ADMIN — SENNOSIDES AND DOCUSATE SODIUM 1 TABLET: 50; 8.6 TABLET ORAL at 07:36

## 2023-01-19 RX ADMIN — ASPIRIN 81 MG: 81 TABLET, COATED ORAL at 07:36

## 2023-01-19 RX ADMIN — MIRTAZAPINE 15 MG: 15 TABLET, FILM COATED ORAL at 20:46

## 2023-01-19 RX ADMIN — HEPARIN SODIUM 5000 UNITS: 5000 INJECTION INTRAVENOUS; SUBCUTANEOUS at 21:35

## 2023-01-19 RX ADMIN — HEPARIN SODIUM 5000 UNITS: 5000 INJECTION INTRAVENOUS; SUBCUTANEOUS at 11:47

## 2023-01-19 RX ADMIN — METFORMIN HYDROCHLORIDE 1000 MG: 500 TABLET ORAL at 07:36

## 2023-01-19 RX ADMIN — INSULIN LISPRO 4 UNITS: 100 INJECTION, SOLUTION INTRAVENOUS; SUBCUTANEOUS at 20:46

## 2023-01-19 RX ADMIN — Medication 3 MG: at 20:46

## 2023-01-19 RX ADMIN — POLYETHYLENE GLYCOL 3350 17 G: 17 POWDER, FOR SOLUTION ORAL at 19:32

## 2023-01-19 RX ADMIN — ATORVASTATIN CALCIUM 80 MG: 80 TABLET, FILM COATED ORAL at 20:46

## 2023-01-19 NOTE — PROGRESS NOTES
81 y.o. patient admitted to rehab with R CVA. A/Ox4. Transfers with walker x1. Mobility restrictions: none. Pt sometimes impulsive. On select 5 carb diet with Glucerna. Poor appetite. Medications taken whole with thins. Pt on tele. On ASA, Heparin for DVT prophylaxis. Skin: WNL with old scars. Dryness and thick, flaky toenails. Oxygen: RA. LDA: none. Has been continent of bowel and continent of bladder. LBM 1/18. Chair/bed alarms in use and call light in reach. Will monitor for safety.

## 2023-01-19 NOTE — PROGRESS NOTES
Department of Physical Medicine & Rehabilitation  Progress Note    Patient Identification:  Annette Ibarra  [id number]  : 1950  Admit date: 2023    Chief Complaint: Acute ischemic right PCA stroke ([address]    Subjective:   No acute events overnight. Patient seen this am sitting up in gym. Reports lightheadedness improving but did still have episode with PT today. Orthostatic VS remain positive. He is trying to increase po intake. Labs reviewed. ROS: No f/c, n/v, cp     Objective:  Patient Vitals for the past 24 hrs:   BP Temp Temp src Pulse Resp SpO2 Weight   23 0725 107/72 98.1 °F (36.7 °C) Oral 60 16 96 % --   23 0653 -- -- -- -- -- -- 161 lb 6 oz (73.2 kg)   23 0407 112/73 98.2 °F (36.8 °C) Oral 68 17 98 % --   23 2355 112/73 98 °F (36.7 °C) Oral 69 16 100 % --   23 2030 114/69 97.8 °F (36.6 °C) Oral 55 16 98 % --   23 1612 113/74 98.1 °F (36.7 °C) Oral 55 16 98 % --   23 1351 117/76 98.6 °F (37 °C) Oral 75 16 99 % --   23 1154 -- -- -- 73 -- -- --     Const: Alert. No distress, pleasant. HEENT: Normocephalic, atraumatic. Normal sclera/conjunctiva. MMM. CV: Regular rhythm, rate ~60  Resp: No respiratory distress. Lungs CTAB. Abd: Soft, nontender, nondistended, NABS+   Ext: No edema. Neuro: Alert, oriented, appropriately interactive. Left VF cut. Psych: Cooperative, appropriate mood and affect    Laboratory data: Available via EMR.    Last 24 hour lab  Recent Results (from the past 24 hour(s))   POCT Glucose    Collection Time: 23 11:09 AM   Result Value Ref Range    POC Glucose 281 (H) 70 - 99 mg/dl    Performed on ACCU-CHEK    POCT Glucose    Collection Time: 23  4:10 PM   Result Value Ref Range    POC Glucose 131 (H) 70 - 99 mg/dl    Performed on ACCU-CHEK    POCT Glucose    Collection Time: 23  8:10 PM   Result Value Ref Range    POC Glucose 211 (H) 70 - 99 mg/dl    Performed on ACCU-CHEK    POCT Glucose    Collection Time: 01/19/23  7:08 AM   Result Value Ref Range    POC Glucose 181 (H) 70 - 99 mg/dl    Performed on ACCU-CHEK    CBC with Auto Differential    Collection Time: 01/19/23  7:46 AM   Result Value Ref Range    WBC 7.3 4.0 - 11.0 K/uL    RBC 4.73 4.20 - 5.90 M/uL    Hemoglobin 13.8 13.5 - 17.5 g/dL    Hematocrit 41.1 40.5 - 52.5 %    MCV 86.9 80.0 - 100.0 fL    MCH 29.2 26.0 - 34.0 pg    MCHC 33.6 31.0 - 36.0 g/dL    RDW 13.6 12.4 - 15.4 %    Platelets 001 696 - 804 K/uL    MPV 7.3 5.0 - 10.5 fL    Neutrophils % 57.7 %    Lymphocytes % 31.0 %    Monocytes % 8.5 %    Eosinophils % 2.3 %    Basophils % 0.5 %    Neutrophils Absolute 4.2 1.7 - 7.7 K/uL    Lymphocytes Absolute 2.3 1.0 - 5.1 K/uL    Monocytes Absolute 0.6 0.0 - 1.3 K/uL    Eosinophils Absolute 0.2 0.0 - 0.6 K/uL    Basophils Absolute 0.0 0.0 - 0.2 K/uL   Basic Metabolic Panel w/ Reflex to MG    Collection Time: 01/19/23  7:46 AM   Result Value Ref Range    Sodium 135 (L) 136 - 145 mmol/L    Potassium reflex Magnesium 4.1 3.5 - 5.1 mmol/L    Chloride 99 99 - 110 mmol/L    CO2 26 21 - 32 mmol/L    Anion Gap 10 3 - 16    Glucose 171 (H) 70 - 99 mg/dL    BUN 15 7 - 20 mg/dL    Creatinine 1.3 0.8 - 1.3 mg/dL    Est, Glom Filt Rate 59 (A) >60    Calcium 9.7 8.3 - 10.6 mg/dL       Therapy progress:  Physical therapy:  Bed Mobility:  Overall Assistance Level: Independent  Sit>supine:  Assistance Level: Independent  Supine>sit:  Assistance Level: Independent  Transfers:  Surface: From bed, From chair with arms, To chair with arms  Device: Cane (simulated)  Sit>stand:  Assistance Level: Stand by assist, Supervision  Stand>sit:  Assistance Level: Supervision  Skilled Clinical Factors: uncontrolled descent at times  Bed<>chair  Technique: Stand pivot  Assistance Level: Supervision  Skilled Clinical Factors: with and without wheeled walker.   Stand Pivot:     Lateral transfer:     Car transfer:  Assistance Level: Stand by assist  Ambulation:  Surface: Level surface, Carpet  Device: Cane (simulated)  Distance: 400' with 4 figure 8s on carpet,  Activity: Within Unit, Within Room, Transport Items  Activity Comments: patient demonstrating difficulty with left visual field  Additional Factors: Verbal cues, Increased time to complete  Assistance Level: Supervision  Gait Deviations: Slow karol, Path deviations  Skilled Clinical Factors: Pt amb without device in kitchen and ADL apt carrying a bag of laundry, bumped the laundry into several objects and doorway  Stairs:  Stair Height: 6''  Device: Bilateral handrails  Number of Stairs: 24 carrying a bag of laundry  Additional Factors: Verbal cues, Increased time to complete  Assistance Level: Supervision  Skilled Clinical Factors: Some lat sway to R  Curb:  Curb Height: 6''  Device: Cane (simulated)  Number of Curbs: 1  Additional Factors: Verbal cues, Increased time to complete  Wheelchair:     Assessment:  Assessment: Mr. Ramya Chisholm (Bill\) is tolerating high level balance activities well and ranges from CGA-SBA. He does have some minor LOB, but he can recover with CGA. He does have diffiuclty attending to left side of environment at times and will bump or brush into the wall on the R. He was able to carry a bag of laundry through the kitchen, bedroom and negotiated 2 flights of stairs since the laundry is in the basement. He demonstrates good endurance but does fatigue with high level balance activities. Patient is below baseline and will benefit from continued skilled therapy for strengthening, balance training, and neuro re-education. Anticipate discharge on Sat 1/21/23 with home PT. Activity Tolerance: Patient tolerated treatment well  Discharge Recommendations: Continue to assess pending progress, Patient would benefit from continued therapy after discharge      Occupational therapy:   Feeding  Assistance Level:  Independent  Skilled Clinical Factors: poor appetite, finally agreeable to eat a bowl of cereal  Grooming/Oral Hygiene  Assistance Level: Independent  Skilled Clinical Factors: alternated sitting & standing  to brush teeth & shave due to dizziness  UE Bathing  Assistance Level: Modified independent  Skilled Clinical Factors: seated on shower chair, he was able to wash rinse & dry UB IND'ly after set up  LE Bathing  Assistance Level: Supervision  Skilled Clinical Factors: close Supervision only while in standing to wash rinse & dry buttocks/backside, no LOB however safety concerns d/t dizziness, sat to dry off his feet (didn't use much soap or washcloth)  UE Dressing  Assistance Level: Set-up  Skilled Clinical Factors: able to doff/don shirt while seated; OT provided w/ set up  LE Dressing  Assistance Level: Supervision  Skilled Clinical Factors: close Supervision only while standing to manage clothing  over hips/buttocks, used sink or GB for support; mild intermittent dizziness w/ transitional movmts  Putting On/Taking Off Footwear  Assistance Level: Independent  Skilled Clinical Factors: crossed legs to doff/don non skid socks, also able to don his shoes without difficulty  Toileting  Assistance Level: Modified independent  Skilled Clinical Factors: stood to urinate, IND w/ clothing management,used GB prn  Assessment:  Assessment: pt is making steady gains w/ OT intervention, has met 1 out of 5 STGs and is making progress towards being IND. He is able to ambulate @ therapy gym w/ CLOSE SBA, bumps into objects on L side despite having cues to scan environment prior to geting up. Stood x 5-6 minutes during Jigsaw game--IND w/ hitting target but required light CGA when bending over to retrieve bean bags from floor & placing into bucket. Pt stood another 5-6 minutes during pipefitting activity--did not see entire picture & struggled to find pieces when on L side of table. Cues to turn head & eyes to scan far L.  Anticipate DC at EOW; he lives at [facility] and will need to be completely IND; he does not want any TSF or shower chair, reports the home is not accessible. Pt is not able to return to work at this time due to balance, neglect & vision issues. Recommend home OT & then transition to BVR/work hardening program  Activity Tolerance: Patient tolerated treatment well  Discharge Recommendations: Outpatient OT  Factors Affecting Discharge: L visual field cut, neglect    Speech therapy:    Speech Therapy Diagnosis  Cognitive Diagnosis: Pt demonstrated functional temporal orientation; attention; working memory and STM ;and concrete VPS/PS and reasoning on testing. Pt wtih deficits on testing performance for complex reasoning . Further assessment of executive funciton unless otherwise notified  Speech Diagnosis: Audible and intelligible connected speech. Communication Diagnosis: [place] language skills appear intact. Pt wtih breakdonw with complex processing (ie extra time or assist). This may be baseline. Pt was functional in verbal expression of needs/wants; CFN; concept explanations/event explanations. PT  Position Activity Restriction  Other position/activity restrictions: reg diet  Objective     Sit to Stand: Contact guard assistance  Stand to Sit: Contact guard assistance  Bed to Chair: Contact guard assistance  Device: Rolling Walker, No Device  Assistance: Contact guard assistance  Distance: 50', 150', 200'. 200' x2 w/o AD  OT  PT Equipment Recommendations  Equipment Needed: Yes  Mobility Devices: Canes  Cane: Straight Cane ([place])  Other: Anthony Doctor needs to be 44 1/2\" tall-pt is 6'8\" tall  Equipment Used: Grab bars  Toilet Transfers Comments: close SB/CBA using GB, mild posterior lean & to L  Assessment        SLP          Body mass index is 17.73 kg/m².     Rehabilitation Diagnosis:   Stroke, 1.1, Left Body (R Brain)        Assessment and Plan:     Impairments:left hemiparesis, left VF deficit, decreased balance, cognition     Acute ischemic right PCA stroke with hemorrhagic transformation  -Localization: P2 occlusion affecting R temporal and occipital lobes, thalamus  -Etiology: concern for embolic source  -Secondary ppx: ASA, statin  -Telemetry while inpatient, then cardiac event monitor  -PT/OT/SLP     DM2 with hyperglycemia  -Hgb A1C 10  -metformin, ISS -- monitor trend, improving     dCHF  -Daily wt  -BP and HR unable to tolerate bb, acei     Sinus bradycardia  -Asymptomatic? Patient does report intermittent dizziness over the past 2-3 years but suspect this is more related to orthostasis based on observation with therapies. -Telemetry with intermittent 1st degree AVB    Orthostatic hypotension  -Suspect related to low po intake, encouraging   -TEDs     SHELLIE vs CKD  -Cr 1.3-1.5 at [facility]  -Avoid nephrotoxins, renally dose meds  -Monitor renal function     Right frontal convexity Meningioma  -Incidental finding  -Plan for f/u with Dr. Leny Storey as outpatient    Depression, low appetite  -Started Remeron     Bladder   -High risk retention   -Monitor PVRs, SC prn >300cc     Bowel   -High risk constipation   -senna+colace BID, PRN miralax, MoM, and bisacodyl supp. Safety   -fall precautions     Pain control  -acetaminophen prn     DVT ppx  -heparin    Rehab Progress: Making progress. Working on balance, functional mobility, compensatory strategies, cognition. Limited mostly by left VF cut. Anticipated Dispo: home to [address] [place] [address]: [facility] PT, OT, RN  DME: Tall cane  PATOS: 1/21      Wanda Cedeno.  Nicolle Anthony MD 1/19/2023, 10:19 AM

## 2023-01-19 NOTE — PLAN OF CARE
Problem: Discharge Planning  Goal: Discharge to home or other facility with appropriate resources  Outcome: Progressing  Flowsheets (Taken 1/19/2023 0735)  Discharge to home or other facility with appropriate resources: Identify barriers to discharge with patient and caregiver     Problem: Safety - Adult  Goal: Free from fall injury  Outcome: Progressing  Flowsheets (Taken 1/19/2023 1009)  Free From Fall Injury: Instruct family/caregiver on patient safety     Problem: Pain  Goal: Verbalizes/displays adequate comfort level or baseline comfort level  Outcome: Progressing     Problem: Neurosensory - Adult  Goal: Achieves stable or improved neurological status  Outcome: Progressing  Flowsheets (Taken 1/19/2023 0735)  Achieves stable or improved neurological status: Assess for and report changes in neurological status  Goal: Absence of seizures  Outcome: Progressing  Flowsheets (Taken 1/19/2023 0735)  Absence of seizures: Monitor for seizure activity.   If seizure occurs, document type and location of movements and any associated apnea  Goal: Remains free of injury related to seizures activity  Outcome: Progressing  Goal: Achieves maximal functionality and self care  Outcome: Progressing     Problem: Respiratory - Adult  Goal: Achieves optimal ventilation and oxygenation  Outcome: Progressing  Flowsheets (Taken 1/19/2023 0735)  Achieves optimal ventilation and oxygenation: Assess for changes in respiratory status     Problem: Cardiovascular - Adult  Goal: Maintains optimal cardiac output and hemodynamic stability  Outcome: Progressing  Flowsheets (Taken 1/19/2023 0735)  Maintains optimal cardiac output and hemodynamic stability:   Monitor blood pressure and heart rate   Monitor urine output and notify Licensed Independent Practitioner for values outside of normal range  Goal: Absence of cardiac dysrhythmias or at baseline  Outcome: Progressing  Flowsheets (Taken 1/19/2023 0735)  Absence of cardiac dysrhythmias or at baseline: Monitor cardiac rate and rhythm     Problem: Skin/Tissue Integrity - Adult  Goal: Skin integrity remains intact  Outcome: Progressing  Flowsheets  Taken 1/19/2023 1009  Skin Integrity Remains Intact: Monitor for areas of redness and/or skin breakdown  Taken 1/19/2023 0735  Skin Integrity Remains Intact: Monitor for areas of redness and/or skin breakdown  Goal: Incisions, wounds, or drain sites healing without S/S of infection  Outcome: Progressing  Flowsheets  Taken 1/19/2023 1009  Incisions, Wounds, or Drain Sites Healing Without Sign and Symptoms of Infection: ADMISSION and DAILY: Assess and document risk factors for pressure ulcer development  Taken 1/19/2023 0735  Incisions, Wounds, or Drain Sites Healing Without Sign and Symptoms of Infection: ADMISSION and DAILY: Assess and document risk factors for pressure ulcer development  Goal: Oral mucous membranes remain intact  Outcome: Progressing  Flowsheets  Taken 1/19/2023 1009  Oral Mucous Membranes Remain Intact: Assess oral mucosa and hygiene practices  Taken 1/19/2023 0735  Oral Mucous Membranes Remain Intact: Assess oral mucosa and hygiene practices     Problem: Musculoskeletal - Adult  Goal: Return mobility to safest level of function  Outcome: Progressing  Flowsheets (Taken 1/19/2023 0735)  Return Mobility to Safest Level of Function: Assess patient stability and activity tolerance for standing, transferring and ambulating with or without assistive devices  Goal: Maintain proper alignment of affected body part  Outcome: Progressing  Flowsheets (Taken 1/19/2023 0735)  Maintain proper alignment of affected body part: Support and protect limb and body alignment per provider's orders  Goal: Return ADL status to a safe level of function  Outcome: Progressing  Flowsheets (Taken 1/19/2023 0735)  Return ADL Status to a Safe Level of Function: Assess activities of daily living deficits and provide assistive devices as needed     Problem: Gastrointestinal - Adult  Goal: Minimal or absence of nausea and vomiting  Outcome: Progressing  Flowsheets (Taken 1/19/2023 0735)  Minimal or absence of nausea and vomiting: Provide nonpharmacologic comfort measures as appropriate  Goal: Maintains or returns to baseline bowel function  Outcome: Progressing  Flowsheets (Taken 1/19/2023 0735)  Maintains or returns to baseline bowel function:   Assess bowel function   Encourage oral fluids to ensure adequate hydration  Goal: Maintains adequate nutritional intake  Outcome: Progressing  Flowsheets (Taken 1/19/2023 0735)  Maintains adequate nutritional intake: Monitor percentage of each meal consumed  Goal: Establish and maintain optimal ostomy function  Outcome: Progressing     Problem: Genitourinary - Adult  Goal: Absence of urinary retention  Outcome: Progressing  Flowsheets (Taken 1/19/2023 0735)  Absence of urinary retention: Assess patients ability to void and empty bladder  Goal: Urinary catheter remains patent  Outcome: Progressing     Problem: Infection - Adult  Goal: Absence of infection at discharge  Outcome: Progressing  Flowsheets (Taken 1/19/2023 0735)  Absence of infection at discharge: Assess and monitor for signs and symptoms of infection  Goal: Absence of infection during hospitalization  Outcome: Progressing  Flowsheets (Taken 1/19/2023 0735)  Absence of infection during hospitalization: Assess and monitor for signs and symptoms of infection  Goal: Absence of fever/infection during anticipated neutropenic period  Outcome: Progressing     Problem: Metabolic/Fluid and Electrolytes - Adult  Goal: Electrolytes maintained within normal limits  Outcome: Progressing  Flowsheets (Taken 1/19/2023 0735)  Electrolytes maintained within normal limits: Monitor labs and assess patient for signs and symptoms of electrolyte imbalances  Goal: Hemodynamic stability and optimal renal function maintained  Outcome: Progressing  Flowsheets (Taken 1/19/2023 0735)  Hemodynamic stability and optimal renal function maintained: Monitor labs and assess for signs and symptoms of volume excess or deficit  Goal: Glucose maintained within prescribed range  Outcome: Progressing  Flowsheets (Taken 1/19/2023 0735)  Glucose maintained within prescribed range: Monitor blood glucose as ordered     Problem: Hematologic - Adult  Goal: Maintains hematologic stability  Outcome: Progressing  Flowsheets (Taken 1/19/2023 0735)  Maintains hematologic stability: Assess for signs and symptoms of bleeding or hemorrhage     Problem: Nutrition Deficit:  Goal: Optimize nutritional status  Outcome: Progressing     Problem: ABCDS Injury Assessment  Goal: Absence of physical injury  Outcome: Progressing  Flowsheets (Taken 1/19/2023 1009)  Absence of Physical Injury: Implement safety measures based on patient assessment     Problem: Skin/Tissue Integrity  Goal: Absence of new skin breakdown  Description: 1. Monitor for areas of redness and/or skin breakdown  2. Assess vascular access sites hourly  3. Every 4-6 hours minimum:  Change oxygen saturation probe site  4. Every 4-6 hours:  If on nasal continuous positive airway pressure, respiratory therapy assess nares and determine need for appliance change or resting period.   Outcome: Progressing

## 2023-01-19 NOTE — PROGRESS NOTES
Physical Therapy  Facility/Department: [facility] REHAB  Rehabilitation Physical Therapy Treatment Note    NAME: Bienvenido Camargo  : 1950 (81 y.o.)  MRN: [id number]  CODE STATUS: Full Code    Date of Service: 23    Restrictions:  Restrictions/Precautions: Fall Risk  Position Activity Restriction  Other position/activity restrictions: reg diet     SUBJECTIVE  Subjective  Pain: Pt reports having a slight headache. OBJECTIVE  Cognition  Overall Cognitive Status: Exceptions  Memory: Decreased short term memory  Safety Judgement: Decreased awareness of need for safety  Problem Solving: Assistance required to identify errors made  Insights: Decreased awareness of deficits  Cognition Comment: pt is cooperative, mild word finding & mild slow processing, has L visual field cut & dizziness which impacts safety w/ transfers & ADLs  Orientation  Overall Orientation Status: Within Normal Limits  Orientation Level: Oriented X4    Functional Mobility  Bed Mobility  Overall Assistance Level: Independent  Bridging  Assistance Level: Independent  Roll Left  Assistance Level: Independent  Roll Right  Assistance Level: Independent  Sit to Supine  Assistance Level: Independent  Supine to Sit  Assistance Level: Independent  Scooting  Assistance Level: Independent  Balance  Sitting Balance: Independent  Standing Balance: Stand by assistance  Transfers  Surface: From bed;From chair with arms; To chair with arms  Sit to Stand  Assistance Level: Stand by assist;Supervision  Stand to Sit  Assistance Level: Supervision  Skilled Clinical Factors: uncontrolled descent at times  Bed To/From Chair  Technique: Stand pivot  Assistance Level: Supervision  Skilled Clinical Factors: with and without wheeled walker. Environmental Mobility  Ambulation  Surface: Level surface; Carpet  Device: Cane (simulated)  Distance: 400' with 4 figure 8s on carpet,  Activity: Within Unit; Within Room;Transport Items  Activity Comments: patient demonstrating difficulty with left visual field  Additional Factors: Verbal cues; Increased time to complete  Assistance Level: Supervision  Gait Deviations: Slow karol; Path deviations  Skilled Clinical Factors: Pt amb without device in kitchen and ADL apt carrying a bag of laundry, bumped the laundry into several objects and doorway  Stairs  Stair Height: 6''  Device: Bilateral handrails  Number of Stairs: 24 carrying a bag of laundry  Additional Factors: Verbal cues; Increased time to complete  Assistance Level: Supervision  Skilled Clinical Factors: Some lat sway to R  Curb  Curb Height: 6''  Device: Cane (simulated)  Number of Curbs: 1  Additional Factors: Verbal cues; Increased time to complete    PT Exercises  Dynamic Standing Balance Exercises: Patient stood at wall and brought arms above head and bounced and caught ball off the wall then took a side step and repeated this for 3' to L/R but he became dizzy and lost his balance and needed min A and the wall to maintain upright. Discontinued activity, he attributed this to not gettinough sleep and being tired. Standing Open/Closed Kinetic Chain Exercises: Mini Squats, marches, heel/toe raises, ABD/ADD, hip ext and knee flexion alternating x15 reps with cues for form. Step ups and lat step ups with 4\" block x10. His balance was challanged more by alternating than doing same leg exs. Asked him to hold onto the // bars as little as possible. The pt was taught and given a written and illustrated HEP of the above exercises. PM session:  PT Exercises  Dynamic Standing Balance Exercises: Patient stood at wall and brought arms above head and bounced and caught ball off the wall then took a side step and repeated this for 3' to L/R but he became dizzy and lost his balance and needed min A and the wall to maintain upright. Discontinued activity, he attributed this to not gettinough sleep and being tired.   Exercise Equipment: Patient performed 15 minutes on NuStep for BLE strengthening, conditioning and reciprocal movement with resistance at 5, seat at 15, bilateral UE at 15  Pt amb again this PM for 400'+ figure 8s on carpet with cane and S.    ASSESSMENT/PROGRESS TOWARDS GOALS  Assessment  Assessment: Mr. \"Bill\" Debbi Infante is tolerating high level balance activities well and ranges from CGA-SBA. He does have some minor LOB, but he can recover with CGA. He does have diffiuclty attending to left side of environment at times and will bump or brush into the wall on the R. He was able to carry a bag of laundry through the kitchen, bedroom and negotiated 2 flights of stairs since the laundry is in the basement. He demonstrates good endurance but does fatigue with high level balance activities. The pt was taught a HEP of BLE strengthening and standing balance exercises. Patient is below baseline and will benefit from continued skilled therapy for strengthening, balance training, and neuro re-education. Anticipate discharge on Sat 1/21/23 with home PT. Activity Tolerance: Patient tolerated treatment well;Patient limited by fatigue  Discharge Recommendations: Continue to assess pending progress; Patient would benefit from continued therapy after discharge  PT Equipment Recommendations  Equipment Needed: Yes  Cane: Straight Cane (TALL)  Other: Asa Held needs to be 40 1/2\" tall-pt is 6'8\" tall    Goals  Patient Goals   Patient Goals : I want to go back to work  Short Term Goals  Time Frame for Short Term Goals: 1 week  Short Term Goal 1: I bed mobility-met  Short Term Goal 2: Mod I for all functional transfers  Short Term Goal 3: Amb 200' with Mod I and LRAD  Short Term Goal 4: Up/down curb with mod I  Short Term Goal 5: Up/down 12 steps with mod I    PLAN OF CARE/SAFETY  Physcial Therapy Plan  General Plan:  minutes of therapy at least 5 out of 7 days a week  Days Per Week: 5 Days  Hours Per Day: 1.5 hours  Therapy Duration: 4-7 Days  Current Treatment Recommendations: Strengthening;Balance training;Functional mobility training;Transfer training; Endurance training;Gait training;Stair training;Neuromuscular re-education; Return to work related activity;Home exercise program;Safety education & training;Patient/Caregiver education & training;Equipment evaluation, education, & procurement; Therapeutic activities  Safety Devices  Type of Devices: All fall risk precautions in place;Gait belt;Left in chair;Patient at risk for falls    EDUCATION  Education  Education Given To: Patient  Education Provided: Plan of Care;Transfer Training;Mobility Training;Equipment; Safety;Home Exercise Program  Education Provided Comments: Pt taught a HEP of standing balance and BLE strengthening exs.   Education Method: Verbal;Demonstration  Barriers to Learning: None  Education Outcome: Verbalized understanding    Therapy Time   Individual Concurrent Group Co-treatment   Time In 0815         Time Out 0900         Minutes 45           Timed Code Treatment Minutes: 39 150 [email or web address] Time     Individual Co-treatment   Time In 1300     Time Out 9592     1106 Noel Rene, PT, 01/19/23 at 1:28 PM

## 2023-01-19 NOTE — PROGRESS NOTES
Occupational Therapy  Facility/Department: [facility]  REHAB  Rehabilitation Occupational Therapy Daily Treatment Note    Date: 23  Patient Name: Ramesh Pedroza       Room: W5V-5549/4620-76  MRN: [id number]  Account: [de-identified]   : 1950  (81 y.o.) Gender: male            PM session: met in therapy dept, he reports mild dizziness & slight headache; encouraged him to ask nurse for medication if he needs it. PT reported to OT & MD of episode of dizziness while tossing a ball, causing a major LOB, PT had to provide assistance to regain his balance. OT will take orthostatic BPs during session. Agreeable for laundry task--he ambulated to/from therapy gym<>room and then to laundry room--overall he needed SBA but lots of cues for pathfinding, he seems unaware of lateral swaying, narrow ABEBA and staggering gait--L visual field/neglect issues noted. He was able to place clothes into washer, cues to locate buttons & slot to pour detergent in. He tolerated UE HEP for strengthening using 4 lb wts, he was able to recall 2 out of 5 exercises, completed 1 set of 20. He completed figure ground task however needed assistance to complete, struggle w/ 1 piece on L side. May benefit from using RW in open areas for community distances. Taken back to room to take orthostatic BPs. Sittin/76, standing 78/55, lying down 129/82, pulse ox 98% (questionable results as pt was wearing long sleeve sweatshirt and he was moving around at times). Pt urgently needed to use toilet--ambulated into bathrm & stood to urinate, used GB in 1 hand, then ambulated over to sink. Left in bed, call light in reach, alarm on. Tx time: 45 min, cont w/ POC Cyndee Pedroza, OTR/L #[id number]         Past Medical History:  has a past medical history of A-fib ([facility] [address] CHF (congestive heart failure) ([facility] [address] DM2 (diabetes mellitus, type 2) ([facility] [address] and Meningioma ([facility] [address]   Past Surgical History:   has no past surgical history on file.    Restrictions  Restrictions/Precautions: Fall Risk  Other position/activity restrictions: reg diet, L visual field deficits    Subjective  Subjective: met in room, he was taking a nap in bed; upset because he is not \"getting any sleep\"  Restrictions/Precautions: Fall Risk             Objective     Cognition  Overall Cognitive Status: Exceptions  Memory: Decreased short term memory  Safety Judgement: Decreased awareness of need for safety  Problem Solving: Assistance required to identify errors made  Insights: Decreased awareness of deficits  Cognition Comment: pt is cooperative, mild word finding & mild slow processing, has L visual field cut & dizziness which impacts safety w/ transfers & ADLs  Orientation  Overall Orientation Status: Within Normal Limits  Orientation Level: Oriented X4         ADL  Feeding  Assistance Level: Independent  Skilled Clinical Factors: limited appetite  Grooming/Oral Hygiene  Assistance Level: Independent  Skilled Clinical Factors: stood to shave, brush teeth  Upper Extremity Bathing  Assistance Level: Modified independent  Skilled Clinical Factors: seated on shower chair, he was able to wash rinse & dry UB IND'ly after set up  Lower Extremity Bathing  Assistance Level: Supervision  Skilled Clinical Factors: Supervision only while in standing to wash rinse & dry buttocks/backside, no LOB however safety concerns d/t dizziness, sat to dry off his feet (isn't very thorough w/ drying off)  Upper Extremity Dressing  Assistance Level: Set-up  Skilled Clinical Factors: able to doff/don shirt while seated; OT provided w/ set up  Lower Extremity Dressing  Assistance Level: Supervision;Set-up  Skilled Clinical Factors: setup, Supervision only while standing to manage clothing  over hips/buttocks, used sink or GB for support; mild intermittent dizziness w/ transitional movmts  Putting On/Taking Off Footwear  Assistance Level:  Independent  Skilled Clinical Factors: crossed legs to doff/don non skid socks, also able to don his shoes without difficulty  Toileting  Assistance Level: Modified independent  Skilled Clinical Factors: stood to urinate, IND w/ clothing management,used GB prn  Toilet Transfers  Equipment: Grab bars  Assistance Level: Modified independent  Skilled Clinical Factors: safety concerns but no LOB; used GB prn  Tub/Shower Transfers  Type: Shower  Transfer To: Shower chair with back  Additional Factors: With handrails  Assistance Level: Supervision  Skilled Clinical Factors: pt preferred to remain seated on shower chair to reduce fall risk since he is reporting intermittent dizziness/lightheadedness, used GB to enter/exit shower stall & to transfer on/off shower chair w/ Supervision          Functional Mobility  Activity: To/From bathroom  Assistance Level: Supervision  Skilled Clinical Factors: close Supervision to ambulate @ his room, to/from bathrm no AD; posterior swaying, narrow ABEBA & L visual field cut  impact his balance & safety  Bed Mobility  Overall Assistance Level: Independent  Roll Left  Assistance Level: Independent  Roll Right  Assistance Level: Independent  Sit to Supine  Assistance Level: Independent  Supine to Sit  Assistance Level: Independent  Skilled Clinical Factors: hospital bed, no rail, HOB flat  Transfers  Surface:  To bed;From bed  Sit to Stand  Assistance Level: Supervision  Skilled Clinical Factors: no AD, mild dizziness reported; SAFETY CONCERNS d/t lack of insight into deficits; does not compensate for L visual field loss; he remains a fall risk  Stand to Sit  Assistance Level: Supervision  Skilled Clinical Factors: mild lightheadedness during transitional movmts  Bed To/From Chair  Assistance Level: Supervision  Skilled Clinical Factors: close Supervision no AD to/from bed , safety concerns due to L visual field cut, impulsiveness         Assessment  Assessment  Assessment: pt is making steady gains w/ OT intervention, has met 1 out of 5 STGs and is making progress towards being IND. He is able to ambulate @ his room, to/from bathrm w/ Supervision--he reports lightheadedness/dizziness which impacts his balance; he can be impulsive & is not attending to L visual field--cues to turn head & eyes to scan far L. He is IND w/ bed mobility, completed toilet transfer & managed clothing IND'ly, used GB prn. He completed shower level bathing w/ Supervision while seated on shower chair most of the time; used GB while in standing; does not dry off entirely before putting clothing on. He needed set up/distance Supervision during LB dressing; he is IND w/ non skid socks & shoes. Anticipate DC at Sat 1/21 to [facility]  will need to be completely IND. Currently Pt is not able to drive or return to work  due to balance, neglect & vision issues. Recommend home OT & then transition to [address] [place] [address] program. He would benefit from shower chair to reduce fall risk during bathing, may need RW for community distances in open areas due to neglect & vision loss  Activity Tolerance: Patient tolerated treatment well  Discharge Recommendations: Outpatient OT  Factors Affecting Discharge: L visual field cut, neglect  OT Equipment Recommendations  Other: recommend shower chair for reducing fall risk during bathing; recommend he use RW in community/open areas d/t L visual field cut & impulsiveness  Safety Devices  Safety Devices in place: Yes  Type of devices: Gait belt;Bed alarm in place;Call light within reach; Left in bed;Nurse notified    Patient Education  Education  Education Given To: Patient  Education Provided: Safety;Transfer Training  Education Provided Comments: educated on purpose of pausing during transitional movmts due to episodes of dizziness/lightheadedness  Education Method: Demonstration;Verbal  Barriers to Learning: Vision;Cognition  Education Outcome: Verbalized understanding;Continued education needed  Skilled Clinical Factors: mild impulsiveness, has visual field cut/neglect L side, lacks insight into deficits    Plan  Occupational Therapy Plan  Times Per Week: 5-6  Times Per Day: Twice a day  Specific Instructions for Next Treatment: DC Saturday to [facility], RECOMMEND [facility] OT services & then transition to outpt OT for work hardening/BVR  Current Treatment Recommendations: Strengthening;Balance training;Functional mobility training;Gait training;Equipment evaluation, education, & procurement;Self-Care / ADL;Return to work related activity;Home management training; Safety education & training  Additional Comments: no driving or returning to work at this time due to vision/neglect issues    Goals  Patient Goals   Patient goals : \"get back to normal & go back to work\"  Short Term Goals  Time Frame for Short Term Goals: by 5 days pt will complete  Short Term Goal 1: Grooming IND'ly while standing at sink--MET  Short Term Goal 2: UB & LB dressing IND'ly  Short Term Goal 3: toileting IND'ly--MET  Short Term Goal 4: household transfers IND'ly  Short Term Goal 5: standing balance x 20 minutes during IADLs, pt is --will need to cook, clean & do laundry  Long Term Goals  Time Frame for Long Term Goals : by 5 days pt will complete  Long Term Goal 1: increase L  strength x 2 lbs to be IND w/ opening containers, lids  Long Term Goal 2: address vision further using vision disk, eye chart, scaning and tracking w/ 100% accuracy                Therapy Time   Individual Concurrent Group PM-treatment   Time In 1015      1345   Time Out 1108      1430   Minutes 53      45   Timed Code Treatment Minutes: [address] Tatianna Valencia, OTR/L #[id number]

## 2023-01-19 NOTE — PROGRESS NOTES
Pt did not receive Glucerna on lunch tray. Dietary stated that they are out of it. L/M with dietician to see if something else could be ordered per pt.  Request.

## 2023-01-19 NOTE — PROGRESS NOTES
Comprehensive Nutrition Assessment    Type and Reason for Visit:  Reassess    Nutrition Recommendations/Plan:   Continue Regular, 5 Carb diet  Continue Glucerna TID     Malnutrition Assessment:  Malnutrition Status: Moderate malnutrition (01/19/23 1319)    Context:  Acute Illness     Findings of the 6 clinical characteristics of malnutrition:  Energy Intake:  No significant decrease in energy intake  Weight Loss:  No significant weight loss     Body Fat Loss: Moderate body fat loss Orbital, Buccal region   Muscle Mass Loss: Moderate muscle mass loss Temples (temporalis)  Fluid Accumulation:  Unable to assess    Strength:  Not Performed    Nutrition Assessment:    Follow-up. Continues on 5 Carb diet with Glucerna ordered TID. Pt reports appetite has been improving. Drinks 100% of ONS and PO intake of meals greater than 50%. Blood sugar 215 today. Provided pt with proper carb control menu and educational handouts. We discussed the Plate Method for controlling carb intake at home as well as label reading. Will continue current nutrition interventions at this time. Nutrition Related Findings:    Labs reviewed. No edema noted. LBM 1/18. Wound Type: None       Current Nutrition Intake & Therapies:    Average Meal Intake: 51-75%, %  Average Supplements Intake: %  ADULT DIET; Regular; 5 carb choices (75 gm/meal)  ADULT ORAL NUTRITION SUPPLEMENT; Breakfast, Lunch, Dinner; Diabetic Oral Supplement    Anthropometric Measures:  Height: 6' 8\" (203.2 cm)  Ideal Body Weight (IBW): 226 lbs (103 kg)    Admission Body Weight: 168 lb (76.2 kg)  Current Body Weight: 161 lb (73 kg), IBW.  Weight Source: Standing Scale  Current BMI (kg/m2): 17.7  BMI Categories: Underweight (BMI less than 22) age over 72    Estimated Daily Nutrient Needs:  Energy (kcal/day): 0198-3024 (30-35 x ABW 76 kg)  Protein (g/day):  (1.2-1.5 x ABW 76 kg)  Fluid (ml/day): < 64 oz per day per CHF protocol    Nutrition Diagnosis:   Moderate malnutrition related to inadequate protein-energy intake as evidenced by Criteria as identified in malnutrition assessment    Nutrition Interventions:   Food and/or Nutrient Delivery: Continue Current Diet, Continue Oral Nutrition Supplement  Nutrition Education/Counseling: Education completed  Coordination of Nutrition Care: Continue to monitor while inpatient    Goals:  Previous Goal Met: Goal(s) Achieved  Goals: PO intake 75% or greater, by next RD assessment    Nutrition Monitoring and Evaluation:   Behavioral-Environmental Outcomes: None Identified  Food/Nutrient Intake Outcomes: Food and Nutrient Intake, Supplement Intake  Physical Signs/Symptoms Outcomes: Biochemical Data, Nutrition Focused Physical Findings, Skin, Weight    Discharge Planning:    Continue current diet, Continue Oral Nutrition Supplement     Reanna Barrios RD, LD  Contact: [phone]

## 2023-01-19 NOTE — PROGRESS NOTES
Pt awake and AAO sitting up in bed watching TV. Pt denies pain. Pt admitted with R CVA with L mingo. No dysphagia. No aphasia. Lungs CTA> No sob or cough. On RA. Belly flat and soft with active BS. LBM today. Voids per urinal. On telemetry. No edema noted. Santy light in reach. Bed alarm on.

## 2023-01-20 LAB
GLUCOSE BLD-MCNC: 156 MG/DL (ref 70–99)
GLUCOSE BLD-MCNC: 191 MG/DL (ref 70–99)
GLUCOSE BLD-MCNC: 200 MG/DL (ref 70–99)
GLUCOSE BLD-MCNC: 286 MG/DL (ref 70–99)
PERFORMED ON: ABNORMAL

## 2023-01-20 PROCEDURE — 97116 GAIT TRAINING THERAPY: CPT | Performed by: PHYSICAL THERAPIST

## 2023-01-20 PROCEDURE — 97110 THERAPEUTIC EXERCISES: CPT | Performed by: PHYSICAL THERAPIST

## 2023-01-20 PROCEDURE — 97535 SELF CARE MNGMENT TRAINING: CPT

## 2023-01-20 PROCEDURE — 97110 THERAPEUTIC EXERCISES: CPT

## 2023-01-20 PROCEDURE — 1280000000 HC REHAB R&B

## 2023-01-20 PROCEDURE — 97530 THERAPEUTIC ACTIVITIES: CPT | Performed by: PHYSICAL THERAPIST

## 2023-01-20 PROCEDURE — 94760 N-INVAS EAR/PLS OXIMETRY 1: CPT

## 2023-01-20 PROCEDURE — 97530 THERAPEUTIC ACTIVITIES: CPT

## 2023-01-20 PROCEDURE — 6360000002 HC RX W HCPCS: Performed by: PHYSICAL MEDICINE & REHABILITATION

## 2023-01-20 PROCEDURE — 6370000000 HC RX 637 (ALT 250 FOR IP): Performed by: PHYSICAL MEDICINE & REHABILITATION

## 2023-01-20 RX ORDER — MIRTAZAPINE 15 MG/1
15 TABLET, FILM COATED ORAL NIGHTLY
Qty: 30 TABLET | Refills: 0 | Status: SHIPPED | OUTPATIENT
Start: 2023-01-20

## 2023-01-20 RX ORDER — ASPIRIN 81 MG/1
81 TABLET ORAL DAILY
Qty: 30 TABLET | Refills: 0 | Status: SHIPPED | OUTPATIENT
Start: 2023-01-20

## 2023-01-20 RX ORDER — QUETIAPINE FUMARATE 50 MG/1
50 TABLET, FILM COATED ORAL NIGHTLY PRN
Qty: 30 TABLET | Refills: 0 | Status: SHIPPED | OUTPATIENT
Start: 2023-01-20

## 2023-01-20 RX ORDER — ATORVASTATIN CALCIUM 80 MG/1
80 TABLET, FILM COATED ORAL NIGHTLY
Qty: 30 TABLET | Refills: 0 | Status: SHIPPED | OUTPATIENT
Start: 2023-01-20 | End: 2023-02-19

## 2023-01-20 RX ADMIN — ACETAMINOPHEN 325MG 650 MG: 325 TABLET ORAL at 08:27

## 2023-01-20 RX ADMIN — SENNOSIDES AND DOCUSATE SODIUM 1 TABLET: 50; 8.6 TABLET ORAL at 07:41

## 2023-01-20 RX ADMIN — SENNOSIDES AND DOCUSATE SODIUM 1 TABLET: 50; 8.6 TABLET ORAL at 21:25

## 2023-01-20 RX ADMIN — ERGOCALCIFEROL 50000 UNITS: 1.25 CAPSULE ORAL at 07:41

## 2023-01-20 RX ADMIN — ATORVASTATIN CALCIUM 80 MG: 80 TABLET, FILM COATED ORAL at 21:25

## 2023-01-20 RX ADMIN — HEPARIN SODIUM 5000 UNITS: 5000 INJECTION INTRAVENOUS; SUBCUTANEOUS at 21:25

## 2023-01-20 RX ADMIN — INSULIN LISPRO 1 UNITS: 100 INJECTION, SOLUTION INTRAVENOUS; SUBCUTANEOUS at 11:44

## 2023-01-20 RX ADMIN — HEPARIN SODIUM 5000 UNITS: 5000 INJECTION INTRAVENOUS; SUBCUTANEOUS at 11:07

## 2023-01-20 RX ADMIN — ASPIRIN 81 MG: 81 TABLET, COATED ORAL at 07:41

## 2023-01-20 RX ADMIN — METFORMIN HYDROCHLORIDE 1000 MG: 500 TABLET ORAL at 07:41

## 2023-01-20 RX ADMIN — MIRTAZAPINE 15 MG: 15 TABLET, FILM COATED ORAL at 21:25

## 2023-01-20 ASSESSMENT — PAIN DESCRIPTION - FREQUENCY: FREQUENCY: INTERMITTENT

## 2023-01-20 ASSESSMENT — PAIN SCALES - GENERAL
PAINLEVEL_OUTOF10: 4
PAINLEVEL_OUTOF10: 0
PAINLEVEL_OUTOF10: 3
PAINLEVEL_OUTOF10: 2

## 2023-01-20 ASSESSMENT — PAIN DESCRIPTION - PAIN TYPE: TYPE: ACUTE PAIN

## 2023-01-20 ASSESSMENT — PAIN DESCRIPTION - DESCRIPTORS: DESCRIPTORS: ACHING

## 2023-01-20 ASSESSMENT — PAIN DESCRIPTION - ORIENTATION: ORIENTATION: RIGHT;LEFT

## 2023-01-20 ASSESSMENT — PAIN DESCRIPTION - ONSET: ONSET: ON-GOING

## 2023-01-20 ASSESSMENT — PAIN - FUNCTIONAL ASSESSMENT: PAIN_FUNCTIONAL_ASSESSMENT: ACTIVITIES ARE NOT PREVENTED

## 2023-01-20 ASSESSMENT — PAIN DESCRIPTION - LOCATION: LOCATION: HEAD

## 2023-01-20 NOTE — PROGRESS NOTES
Patient admitted to rehab with acute ischemic right PCA stroke. A/Ox4. Transfers with one assist using gait belt and front wheeled walker for ambulation. Mobility restrictions: WBAT. On regular 5 carb diet, tolerating well. Medications taken whole with thin liquids. On aspirin and heparin for DVT prophylaxis. Skin: intact. Oxygen: room air. LDA: none. Has been continent of bowel and  of bladder. LBM 1/18. Chair/bed alarms in use and call light in reach. Will monitor for safety.

## 2023-01-20 NOTE — CARE COORDINATION
01/20/23 1747   IMM Letter   IMM Letter given to Patient/Family/Significant other/Guardian/POA/by: Education provided to patient, patient reported no questions and verbalized understanding. Patient aware of 4 hours allotted time to determine if they choose to pursue Medicare appeal process.    IMM Letter date given: 01/20/23   IMM Letter time given: 5992

## 2023-01-20 NOTE — PROGRESS NOTES
Department of Physical Medicine & Rehabilitation  Progress Note    Patient Identification:  Cyn Storey  [id number]  : 1950  Admit date: 2023    Chief Complaint: Acute ischemic right PCA stroke ([address]    Subjective:   No acute events overnight. Patient seen this am sitting  up in gym. He reports feeling well and looking forward to discharge home tomorrow. We discussed plans for home care, medications, and follow-ups. Also provided additional education on treatment strategies for orthostatic hypotension. Labs reviewed. ROS: No f/c, n/v, cp     Objective:  Patient Vitals for the past 24 hrs:   BP Temp Temp src Pulse Resp SpO2   23 0917 -- -- -- -- -- 96 %   23 0833 -- -- -- -- -- 97 %   23 0740 107/69 98.2 °F (36.8 °C) Oral 62 16 95 %   23 0603 105/68 98.3 °F (36.8 °C) Oral 59 16 96 %   23 1924 111/73 98.1 °F (36.7 °C) Oral 62 16 97 %   23 1533 113/71 98.5 °F (36.9 °C) Oral 60 16 99 %     Const: Alert. No distress, pleasant. HEENT: Normocephalic, atraumatic. Normal sclera/conjunctiva. MMM. CV: Regular rhythm, rate ~60  Resp: No respiratory distress. Lungs CTAB. Abd: Soft, nontender, nondistended, NABS+   Ext: No edema. Neuro: Alert, oriented, appropriately interactive. Left VF cut. Psych: Cooperative, appropriate mood and affect    Laboratory data: Available via EMR.    Last 24 hour lab  Recent Results (from the past 24 hour(s))   POCT Glucose    Collection Time: 23  4:01 PM   Result Value Ref Range    POC Glucose 177 (H) 70 - 99 mg/dl    Performed on ACCU-CHEK    POCT Glucose    Collection Time: 23  8:09 PM   Result Value Ref Range    POC Glucose 310 (H) 70 - 99 mg/dl    Performed on ACCU-CHEK    POCT Glucose    Collection Time: 23  6:50 AM   Result Value Ref Range    POC Glucose 156 (H) 70 - 99 mg/dl    Performed on ACCU-CHEK    POCT Glucose    Collection Time: 23 11:21 AM   Result Value Ref Range    POC Glucose 200 (H) 70 - 99 mg/dl    [facility]        Therapy progress:  Physical therapy:  Bed Mobility:  Overall Assistance Level: Independent  Sit>supine:  Assistance Level: Independent  Supine>sit:  Assistance Level: Independent  Transfers:  Surface: From chair with arms, To chair with arms, Wheelchair  Device: Cane (simulated)  Sit>stand:  Assistance Level: Modified independent  Stand>sit:  Assistance Level: Supervision, Modified independent  Skilled Clinical Factors: uncontrolled descent at times, moves quickly  Bed<>chair  Technique: Stand pivot  Assistance Level: Modified independent  Skilled Clinical Factors: with and without wheeled walker.   Stand Pivot:     Lateral transfer:     Car transfer:  Assistance Level: Modified independent  Skilled Clinical Factors: reminders to turn square up to car prior to sit into passenger seat to car  Ambulation:  Surface: Level surface, Carpet  Device: Cane (simulated)  Distance: 400', 180' with several turns  Activity: Within Unit, Within Room  Activity Comments: patient demonstrating difficulty with left visual field  Additional Factors: Verbal cues, Increased time to complete  Assistance Level: Supervision, Modified independent  Gait Deviations: Path deviations  Skilled Clinical Factors: Patient has occasional scissor L LE but no LOB, instructed to slow down and shorten stride  Stairs:  Stair Height: 6''  Device: One handrail, Cane (simulated cane on R side using crutch)  Number of Stairs: 24 carrying a bag of laundry  Additional Factors: Verbal cues, Increased time to complete  Assistance Level: Modified independent  Skilled Clinical Factors: Some lat sway to lL and R  Curb:  Curb Height: 6''  Device: Cane (simulated)  Number of Curbs: 2  Additional Factors: Verbal cues, Increased time to complete (with cane)  Assistance Level: Modified independent  Skilled Clinical Factors: safety concerns, first attempt slight wobble , second much steadier with no assist  Wheelchair: Assessment:  Assessment: Mr. \"Bill\" Alli Hyman is presently using a crutch to simulate cane for mobility requiring MI with cues to slow down with occasional deviation with no LOB. Patient will need extra tall cane at D/C due to his height of 6' 8\". Patient no significant c/o of lightheadedness. Patient able to manage flight of steps with MI. He demonstrates good endurance but does fatigue with high level balance activities. Patient able to  object from floor with out LOB with cues to bend knees to prevent hypotension. Patient is below baseline and will benefit from continued skilled therapy for strengthening, balance training, and neuro re-education. Anticipate discharge on Sat 1/21/23 with home PT. Patient will need extra tall cane prior to D/C. Activity Tolerance: Treatment limited secondary to medical complications, Patient tolerated evaluation without incident  Discharge Recommendations: Continue to assess pending progress, Patient would benefit from continued therapy after discharge      Occupational therapy:   Feeding  Assistance Level: Independent  Skilled Clinical Factors: limited appetite  Grooming/Oral Hygiene  Assistance Level:  Independent  Skilled Clinical Factors: stood to shave, brush teeth  UE Bathing  Assistance Level: Modified independent  Skilled Clinical Factors: seated on shower chair, he was able to wash rinse & dry UB IND'ly after set up  LE Bathing  Assistance Level: Supervision  Skilled Clinical Factors: Supervision only while in standing to wash rinse & dry buttocks/backside, no LOB however safety concerns d/t dizziness, sat to dry off his feet (isn't very thorough w/ drying off)  UE Dressing  Assistance Level: Set-up  Skilled Clinical Factors: able to doff/don shirt while seated; OT provided w/ set up  LE Dressing  Assistance Level: Supervision, Set-up  Skilled Clinical Factors: setup, Supervision only while standing to manage clothing  over hips/buttocks, used sink or GB for support; mild intermittent dizziness w/ transitional movmts  Putting On/Taking Off Footwear  Assistance Level: Independent  Skilled Clinical Factors: crossed legs to doff/don non skid socks, also able to don his shoes without difficulty  Toileting  Assistance Level: Modified independent  Skilled Clinical Factors: stood to urinate, IND w/ clothing management,used GB prn  Assessment:  Assessment: pt is making steady gains w/ OT intervention, has met 1 out of 5 STGs however he has NOT achieved an  IND level. He was offered a shower but declined (had 1 on 1/19/23). He is able to ambulate @ his room, to/from bathrm w/ Supervision--he reports lightheadedness/dizziness which impacts his balance; he can be impulsive & is not attending to L visual field--cues to turn head & eyes to scan far L. He is IND w/ bed mobility, completed toilet transfer & managed clothing IND'ly, used GB prn. On 1/19/23, he completed shower level bathing w/ Supervision while seated on shower chair most of the time; used GB while in standing; does not dry off entirely before putting clothing on. He needed set up/distance Supervision during LB dressing; he is IND w/ non skid socks & shoes. Anticipate DC at Sat 1/21 to [facility] however he is having orthostatic BP issues--systolic level dropped 20 points from lying down to standing. OT applied compression stockings & elevated his legs in bed which improved systolic by 12 points (Gabi RN aware). Currently Pt is not able to drive or return to work  due to balance, neglect & vision issues.  Recommend home OT & then transition to [address] [place] [address] program. He would benefit from shower chair to reduce fall risk during bathing, may need RW for community distances in open areas due to neglect & vision loss  Activity Tolerance: Treatment limited secondary to medical complications, Patient tolerated evaluation without incident  Discharge Recommendations: S Level 2, Home with assist PRN, Home with Home health OT  Factors Affecting Discharge: L visual field cut, neglect    Speech therapy:    Speech Therapy Diagnosis  Cognitive Diagnosis: Pt demonstrated functional temporal orientation; attention; working memory and STM ;and concrete VPS/PS and reasoning on testing. Pt wtih deficits on testing performance for complex reasoning . Further assessment of executive funciton unless otherwise notified  Speech Diagnosis: Audible and intelligible connected speech. Communication Diagnosis: [place] language skills appear intact. Pt wtih breakdonw with complex processing (ie extra time or assist). This may be baseline. Pt was functional in verbal expression of needs/wants; CFN; concept explanations/event explanations. PT  Position Activity Restriction  Other position/activity restrictions: reg diet, L visual field deficits, orthostatic BPs, on teley  Objective     Sit to Stand: Contact guard assistance  Stand to Sit: Contact guard assistance  Bed to Chair: Contact guard assistance  Device: Rolling Walker, No Device  Assistance: Contact guard assistance  Distance: 50', 150', 200'. 200' x2 w/o AD  OT  PT Equipment Recommendations  Equipment Needed: Yes  Mobility Devices: Canes  Cane: Straight Cane  Other: Erika Sunset needs to be 44 1/2\" tall-pt is 6'8\" tall  Equipment Used: Grab bars  Toilet Transfers Comments: close SB/CBA using GB, mild posterior lean & to L  Assessment        SLP          Body mass index is 17.73 kg/m².     Rehabilitation Diagnosis:   Stroke, 1.1, Left Body (R Brain)        Assessment and Plan:     Impairments:left hemiparesis, left VF deficit, decreased balance, cognition     Acute ischemic right PCA stroke with hemorrhagic transformation  -Localization: P2 occlusion affecting R temporal and occipital lobes, thalamus  -Etiology: concern for embolic source  -Secondary ppx: ASA, statin  -Telemetry while inpatient, then cardiac event monitor  -PT/OT/SLP     DM2 with hyperglycemia  -Hgb A1C 10  -metformin, ISS -- blood sugars somewhat variable depending on po intake. Counseled on importance of consistent eating habits. dCHF  -Daily wt  -BP and HR unable to tolerate bb, acei     Sinus bradycardia  -Asymptomatic? Patient does report intermittent dizziness over the past 2-3 years but suspect this is more related to orthostasis based on observation with therapies. -Telemetry with intermittent 1st degree AVB    Orthostatic hypotension  -Suspect related to low po intake, encouraging   -TEDs  -Provided education on safety strategies     SHELLIE vs CKD  -Cr 1.3-1.5 at [facility]  -Avoid nephrotoxins, renally dose meds  -Cr remains stable 1.3     Right frontal convexity Meningioma  -Incidental finding  -Plan for f/u with Dr. Sheeba Motta as outpatient    Depression, low appetite  -Started Remeron     Bladder   -High risk retention   -Monitor PVRs, SC prn >300cc     Bowel   -High risk constipation   -senna+colace BID, PRN miralax, MoM, and bisacodyl supp. Safety   -fall precautions     Pain control  -acetaminophen prn     DVT ppx  -heparin    Rehab Progress: Making progress. Working on balance, functional mobility, compensatory strategies, cognition. Limited mostly by left VF cut. Anticipated Dispo: home to [address] [place] [address]: Denys Ambriz PT, OT, RN  DME: Tall cane  JULY: 1/21      Luisana Zamora.  Donny Hutchinson MD 1/20/2023, 2:40 PM

## 2023-01-20 NOTE — PLAN OF CARE
Problem: Discharge Planning  Goal: Discharge to home or other facility with appropriate resources  Outcome: Progressing  Flowsheets (Taken 1/20/2023 0820)  Discharge to home or other facility with appropriate resources:   Identify barriers to discharge with patient and caregiver   Arrange for needed discharge resources and transportation as appropriate     Problem: Safety - Adult  Goal: Free from fall injury  Outcome: Progressing  Flowsheets (Taken 1/20/2023 0820)  Free From Fall Injury:   Instruct family/caregiver on patient safety   Based on caregiver fall risk screen, instruct family/caregiver to ask for assistance with transferring infant if caregiver noted to have fall risk factors  Note: Pt is at risk for falls. Call light in reach. Bed in low position. Alarm on. Nonskid footwear on. Possessions in reach.   Gait belt for transfers and ambulation     Problem: Pain  Goal: Verbalizes/displays adequate comfort level or baseline comfort level  Outcome: Progressing  Flowsheets (Taken 1/20/2023 0820)  Verbalizes/displays adequate comfort level or baseline comfort level:   Encourage patient to monitor pain and request assistance   Assess pain using appropriate pain scale   Administer analgesics based on type and severity of pain and evaluate response   Implement non-pharmacological measures as appropriate and evaluate response   Notify Licensed Independent Practitioner if interventions unsuccessful or patient reports new pain   Consider cultural and social influences on pain and pain management     Problem: Neurosensory - Adult  Goal: Achieves stable or improved neurological status  Outcome: Progressing  Flowsheets (Taken 1/20/2023 0820)  Achieves stable or improved neurological status:   Assess for and report changes in neurological status   Maintain blood pressure and fluid volume within ordered parameters to optimize cerebral perfusion and minimize risk of hemorrhage     Problem: Neurosensory - Adult  Goal: Achieves maximal functionality and self care  Outcome: Progressing  Flowsheets (Taken 1/20/2023 0820)  Achieves maximal functionality and self care: Encourage and assist patient to increase activity and self care with guidance from physical therapy/occupational therapy     Problem: ABCDS Injury Assessment  Goal: Absence of physical injury  Outcome: Progressing  Flowsheets  Taken 1/20/2023 0820  Absence of Physical Injury: Implement safety measures based on patient assessment  Taken 1/20/2023 0819  Absence of Physical Injury: Implement safety measures based on patient assessment     Problem: Skin/Tissue Integrity  Goal: Absence of new skin breakdown  Description: 1. Monitor for areas of redness and/or skin breakdown  2. Assess vascular access sites hourly  3. Every 4-6 hours minimum:  Change oxygen saturation probe site  4. Every 4-6 hours:  If on nasal continuous positive airway pressure, respiratory therapy assess nares and determine need for appliance change or resting period.   Outcome: Progressing

## 2023-01-20 NOTE — DISCHARGE INSTR - COC
Continuity of Care Form    Patient Name: Esmer Kowalski   :  1950  MRN:  [id number]    Admit date:  2023  Discharge date:  23    Code Status Order: Full Code   Advance Directives:     Admitting Physician:  Kirk Pereira MD  PCP: No primary care provider on file. Discharging Nurse: [facility] Unit/Room#: J7H-3042/1828-43  Discharging Unit Phone Number: [phone]    Emergency Contact:   Extended Emergency Contact Information  Primary Emergency Contact: Mary Jo Silva [address] Phone: [phone]  Relation: Brother/Sister  Secondary Emergency Contact: [address] [facility] Phone: [phone]  Relation: Brother/Sister    Past Surgical History:  History reviewed. No pertinent surgical history.     Immunization History:   Immunization History   Administered Date(s) Administered    COVID-19, MODERNA BLUE border, Primary or Immunocompromised, (age 12y+), IM, 100 mcg/0.5mL 2021, 2021    COVID-19, [address], (age 6y-11y), IM, 25mcg/0.25 mL dose 2022    COVID-19, PFIZER Bivalent BOOSTER, DO NOT Dilute, (age 12y+), IM, 27 mcg/0.3 mL 2022       Active Problems:  Patient Active Problem List   Diagnosis Code    Acute ischemic right PCA stroke ([facility] I63.531    Moderate malnutrition ([facility] E44.0       Isolation/Infection:   Isolation            No Isolation          Patient Infection Status       None to display            Nurse Assessment:  Last Vital Signs: /69   Pulse 62   Temp 98.2 °F (36.8 °C) (Oral)   Resp 16   Ht 6' 8\" (2.032 m)   Wt 161 lb 6 oz (73.2 kg)   SpO2 96%   BMI 17.73 kg/m²     Last documented pain score (0-10 scale): Pain Level: 2  Last Weight:   Wt Readings from Last 1 Encounters:   23 161 lb 6 oz (73.2 kg)     Mental Status:  oriented, alert, coherent, logical, thought processes intact, and able to concentrate and follow conversation    IV Access:  - None    Nursing Mobility/ADLs:  Walking Independent  Transfer  Independent  Bathing  Independent  Dressing  Independent  Toileting  Independent  Feeding  Independent  Med [address]  Assisted  Med Delivery   whole    Wound Care Documentation and Therapy:        Elimination:  Continence: Bowel: Yes  Bladder: Yes  Urinary Catheter: None   Colostomy/Ileostomy/Ileal Conduit: No       Date of Last BM: 1/20/23    Intake/Output Summary (Last 24 hours) at 1/20/2023 1443  Last data filed at 1/20/2023 1158  Gross per 24 hour   Intake 1080 ml   Output 1 ml   Net 1079 ml     I/O last 3 completed shifts: In: 720 [P.O.:720]  Out: 952 [Urine:952]    Safety Concerns: At Risk for Falls    Impairments/Disabilities:      None    Nutrition Therapy:  Current Nutrition Therapy:   - Oral Diet:  General and Carb Control 5 carbs/meal (2000kcals/day)    Routes of Feeding: Oral  Liquids: Thin Liquids  Daily Fluid Restriction: no  Last Modified Barium Swallow with Video (Video Swallowing Test): not done    Treatments at the Time of Hospital Discharge:   Respiratory Treatments: none  Oxygen Therapy:  is not on home oxygen therapy.   Ventilator:    - No ventilator support    Rehab Therapies: Physical Therapy and Occupational Therapy  Weight Bearing Status/Restrictions: No weight bearing restrictions  Other Medical Equipment (for information only, NOT a DME order):  cane  Other Treatments:     Patient's personal belongings (please select all that are sent with patient):  None    RN SIGNATURE:  Electronically signed by Natalia Borwn RN on 1/21/23 at 10:53 AM EST    CASE MANAGEMENT/SOCIAL WORK SECTION    Inpatient Status Date: ***    Readmission Risk Assessment Score:  Readmission Risk              Risk of Unplanned Readmission:  10           Discharging to Facility/ Agency   Name:   Address:  Phone:  Fax:    Dialysis Facility (if applicable)   Name:  Address:  Dialysis Schedule:  Phone:  Fax:    / signature: {Esignature:[id number]}    PHYSICIAN SECTION    Prognosis: Good    Condition at Discharge: Stable    Rehab Potential (if transferring to Rehab): Good    Recommended Labs or Other Treatments After Discharge:   Home care PT, OT, RN  Follow-up with PCP, Cardiology, Neurology, Neurosurgery    Physician Certification: I certify the above information and transfer of Bienvenido Camargo  is necessary for the continuing treatment of the diagnosis listed and that he requires [address] for less 30 days.      Update Admission H&P: No change in H&P    PHYSICIAN SIGNATURE:  Electronically signed by Azael Polo MD on 1/20/23 at 2:43 PM EST

## 2023-01-20 NOTE — PROGRESS NOTES
Occupational Therapy  Facility/Department: [facility]  REHAB  Rehabilitation Occupational Therapy Daily AM & PM Treatment Note/DC Summary    Date: 23  Patient Name: Wilbur Quintana       Room: E2C-0130/4368-40  MRN: [id number]  Account: [de-identified]   : 1950  (81 y.o.) Gender: male            PM session: met in therapy dept, he just finished PT session (awaiting a tall cane to be delivered as pt is on target to DC tomorrow / [facility] OT services). Pt agreeable for simple meal prep. He prepared a bowl of oatmeal in microwave, leans against counter & hand reaches for cabinet to steady himself. Able to place bowl in microwave & heat oatmeal x 1 min. Pt's standing balance was MI, occasional swaying but no LOB. He improved  strength x 6 lbs on L hand (now at 91 lbs which is above average for age group). His 9 hole peg scores improved by 2 seconds on L and by 5 seconds on R, but scores remain between 5-10 seconds below the norm for age group. He tolerated UE HEP using 5 lb wts, completed 2 set of 15 however needed cues to stop activity (distracted/inattention?). Pt completed room mobility IND'ly using crutch to & from bathrm, toilet & bed; encouraged pt to sit up in recliner for all meals (he spends most of his time in bed when not in therapy). Mild dizziness during session. Discussed the importance of follow up appmts w/ neurology & cardiology. Left in bed, call light in reach, bed alarm on. Tx time: 45 min. DC to [facility] tomorrow w/ [facility] OT services and cane; recommend shower chair to reduce fall risk during bathing. Carissa Duggan OTR/L #[id number]         Past Medical History:  has a past medical history of A-fib ([address] CHF (congestive heart failure) ([facility] [address] DM2 (diabetes mellitus, type 2) ([address] and Meningioma ([facility] [address] Past Surgical History:   has no past surgical history on file.     Restrictions  Restrictions/Precautions: Fall Risk  Other position/activity restrictions: reg diet, L visual field deficits, orthostatic BPs, on teley    Subjective  Subjective: met in room, pt semi reclined in bed, watching TV, already had breakfast  Restrictions/Precautions: Fall Risk             Objective     Cognition  Overall Cognitive Status: Exceptions  Memory: Decreased short term memory  Safety Judgement: Decreased awareness of need for safety  Problem Solving: Assistance required to identify errors made  Insights: Decreased awareness of deficits  Cognition Comment: pt is cooperative, mild word finding & mild slow processing, has L visual field cut,  dizziness & lack of insight which impacts safety w/ transfers & ADLs  Orientation  Overall Orientation Status: Within Normal Limits  Orientation Level: Oriented X4         ADL             Functional Mobility  Assistance Level: Supervision  Skilled Clinical Factors: close Supervision to ambulate @ his room, to/from bathrm no AD; posterior swaying, narrow ABEBA & L visual field cut  impact his balance & safety, OT took orthostatic BPs--his systolic dropped by 20 pts while in standing, he complains of dizziness however lacks insight into deficits; HIGHLY recommend he use RW when dizziness occurs d/t having drops in BP  Bed Mobility  Overall Assistance Level: Independent  Roll Left  Assistance Level: Independent  Roll Right  Assistance Level: Independent  Sit to Supine  Assistance Level: Independent  Skilled Clinical Factors: hospital bed, no rail, HOB flat  Supine to Sit  Assistance Level: Independent  Skilled Clinical Factors: hospital bed, no rail, HOB flat  Transfers  Surface:  To bed;From bed  Sit to Stand  Assistance Level: Supervision  Skilled Clinical Factors: no AD, mild dizziness reported; SAFETY CONCERNS d/t lack of insight into deficits; does not compensate for L visual field loss; he remains a fall risk  Stand to Sit  Assistance Level: Supervision  Skilled Clinical Factors: mild lightheadedness during transitional movmts  Bed To/From Chair  Assistance Level: Supervision  Skilled Clinical Factors: close Supervision no AD to/from bed , safety concerns due to L visual field cut, impulsiveness--having drops in BP causing dizziness         Assessment  Assessment  Assessment: pt is making steady gains w/ OT intervention, has met 1 out of 5 STGs however he has NOT achieved an  IND level. He was offered a shower but declined (had 1 on 1/19/23). He is able to ambulate @ his room, to/from bathrm w/ Supervision--he reports lightheadedness/dizziness which impacts his balance; he can be impulsive & is not attending to L visual field--cues to turn head & eyes to scan far L. He is IND w/ bed mobility, completed toilet transfer & managed clothing IND'ly, used GB prn. On 1/19/23, he completed shower level bathing w/ Supervision while seated on shower chair most of the time; used GB while in standing; does not dry off entirely before putting clothing on. He needed set up/distance Supervision during LB dressing; he is IND w/ non skid socks & shoes. Anticipate DC at Sat 1/21 to [facility] however he is having orthostatic BP issues--systolic level dropped 20 points from lying down to standing. OT applied compression stockings & elevated his legs in bed which improved systolic by 12 points (Gabi RN aware). Currently Pt is not able to drive or return to work  due to balance, neglect & vision issues.  Recommend home OT & then transition to [address] [place] [address] program. He would benefit from shower chair to reduce fall risk during bathing, may need RW for community distances in open areas due to neglect & vision loss  Activity Tolerance: Treatment limited secondary to medical complications  Discharge Recommendations: S Level 2;Home with assist PRN;Home with Home health OT  Factors Affecting Discharge: L visual field cut, neglect  OT Equipment Recommendations  Other: recommend shower chair for reducing fall risk during bathing; recommend he use RW in community/open areas d/t L visual field cut & impulsiveness  Safety Devices  Safety Devices in place: Yes  Type of devices: Gait belt;Call light within reach; Left in bed;Nurse notified; Bed alarm in place    Patient Education  Education  Education Given To: Patient  Education Provided: Safety;Transfer Training;Mobility Training  Education Provided Comments: educated on purpose of pausing & using RW during transitional movmts due to episodes of dizziness/lightheadedness, educated on purpose of wearing compression stockings & elevating feet to improve BP  Education Method: Demonstration;Verbal  Barriers to Learning: Vision;Cognition  Education Outcome: Verbalized understanding;Continued education needed  Skilled Clinical Factors: mild impulsiveness, has visual field cut/neglect L side, lacks insight into deficits    Plan  Occupational Therapy Plan  Times Per Week: 5-6  Times Per Day: Twice a day  Specific Instructions for Next Treatment: DC Saturday to [facility], [facility] & then transition to outpt OT for work hardening/BVR (pt having orthostatic BP issues & may not be DC'ed as scheduled)  Current Treatment Recommendations: Strengthening;Balance training;Functional mobility training;Gait training;Equipment evaluation, education, & procurement;Self-Care / ADL;Return to work related activity;Home management training; Safety education & training;Neuromuscular re-education  Additional Comments: no driving or returning to work at this time due to vision/neglect/balance issues    Goals  Patient Goals   Patient goals : \"get back to normal & go back to work\"  Short Term Goals  Time Frame for Short Term Goals: by 5 days pt will complete  Short Term Goal 1: Grooming IND'ly while standing at sink--MET  Short Term Goal 2: UB & LB dressing IND'ly  Short Term Goal 3: toileting IND'ly--MET  Short Term Goal 4: household transfers IND'ly  Short Term Goal 5: standing balance x 20 minutes IND'ly during IADLs, pt is --will need to cook, clean & do laundry  Long Term Goals  Time Frame for Long Term Goals : by 5 days pt will complete  Long Term Goal 1: increase L  strength x 2 lbs to be IND w/ opening containers, lids  Long Term Goal 2: address vision further using vision disk, eye chart, scaning and tracking w/ 100% accuracy--partially MET, vision was assessed, has L visual field cut but compensates less than 50% of the time without cues                   Therapy Time   Individual Concurrent Group PM-treatment   Time In 0815      1345   Time Out 0910      1430   Minutes 55      45   Timed Code Treatment Minutes: [address] Var[address] Mahmood [place] #[id number]

## 2023-01-20 NOTE — PROGRESS NOTES
Physical Therapy  Facility/Department: [address] REHAB  Rehabilitation Physical Therapy Treatment Note  AM and PM  Discharge Summary    NAME: Lisa Wing  : 1950 (81 y.o.)  MRN: [id number]  CODE STATUS: Full Code    Date of Service: 23       Restrictions:  Restrictions/Precautions: Fall Risk  Position Activity Restriction  Other position/activity restrictions: reg diet, L visual field deficits, orthostatic BPs, on teley     SUBJECTIVE  Subjective  Subjective: Patient reports slept off and on. Patient agreeable to therapy. Pain: Pt reports minimal HA        OBJECTIVE  Cognition  Overall Cognitive Status: Exceptions  Memory: Decreased short term memory  Safety Judgement: Decreased awareness of need for safety  Problem Solving: Assistance required to identify errors made  Insights: Decreased awareness of deficits  Cognition Comment: pt is cooperative, mild word finding & mild slow processing, has L visual field cut,  dizziness & lack of insight which impacts safety w/ transfers & ADLs  Orientation  Overall Orientation Status: Within Normal Limits  Orientation Level: Oriented X4    Functional Mobility  Bed Mobility  Overall Assistance Level: Independent  Bridging  Assistance Level: Independent  Roll Left  Assistance Level: Independent  Roll Right  Assistance Level: Independent  Sit to Supine  Assistance Level: Independent  Supine to Sit  Assistance Level: Independent  Scooting  Assistance Level: Independent  Balance  Sitting Balance: Independent  Standing Balance: Modified independent  (using tall crutch as a cane, occasional reaches objects for UE support)  Standing Balance  Activity: Patient able to  object from floor with cane with cues to bend knees and not excessively bend forward due to hypotension  Transfers  Surface: From chair with arms; To chair with arms; Wheelchair  Sit to Stand  Assistance Level: Modified independent  Stand to Sit  Assistance Level: Supervision;Modified independent  Skilled Clinical Factors: uncontrolled descent at times, moves quickly  Bed To/From Chair  Technique: Stand pivot  Assistance Level: Modified independent  Skilled Clinical Factors: with and without wheeled walker. Car Transfer  Assistance Level: Modified independent  Skilled Clinical Factors: reminders to turn square up to car prior to sit into passenger seat to car      Environmental Mobility  Ambulation  Surface: Level surface; Carpet  Device: Cane (simulated)  Distance: 400', 180' with several turns  Activity: Within Unit; Within Room  Activity Comments: patient demonstrating difficulty with left visual field  Additional Factors: Verbal cues; Increased time to complete  Assistance Level: Supervision;Modified independent  Gait Deviations: Path deviations  Skilled Clinical Factors: Patient has occasional scissor L LE but no LOB, instructed to slow down and shorten stride  Stairs  Stair Height: 6''  Device: One handrail;Cane (simulated cane on R side using crutch)  Number of Stairs: 24 carrying a bag of laundry  Additional Factors: Verbal cues; Increased time to complete  Assistance Level: Modified independent  Skilled Clinical Factors: Some lat sway to lL and R  Curb  Curb Height: 6''  Device: Cane (simulated)  Number of Curbs: 2  Additional Factors: Verbal cues; Increased time to complete (with cane)  Assistance Level: Modified independent  Skilled Clinical Factors: safety concerns, first attempt slight wobble , second much steadier with no assist        ASSESSMENT/PROGRESS TOWARDS GOALS       Assessment  Assessment: Mr. \"Bill\" Luis F William is presently using a crutch to simulate cane for mobility requiring MI with cues to slow down with occasional deviation with no LOB. Patient will need extra tall cane at D/C due to his height of 6' 8\". Patient no significant c/o of lightheadedness. Patient able to manage flight of steps with MI.   He demonstrates good endurance but does fatigue with high level balance activities. Patient able to  object from floor with out LOB with cues to bend knees to prevent hypotension. Patient is below baseline and will benefit from continued skilled therapy for strengthening, balance training, and neuro re-education. Anticipate discharge on Sat 1/21/23 with home PT. Patient will need extra tall cane prior to D/C. Activity Tolerance: Treatment limited secondary to medical complications; Patient tolerated evaluation without incident  Discharge Recommendations: Continue to assess pending progress; Patient would benefit from continued therapy after discharge  PT Equipment Recommendations  Equipment Needed: Yes  Cane: Straight Cane  Other: Martha Vazquez needs to be 40 1/2\" tall-pt is 6'8\" tall, patient to take crutch home used as a cane until tall cane delivered    Goals  Patient Goals   Patient Goals : I want to go back to work  Short Term Goals  Time Frame for Short Term Goals: 1 week  Short Term Goal 1: I bed mobility-met  Short Term Goal 2: Mod I for all functional transfers- met 1/20  Short Term Goal 3: Amb 200' with Mod I and LRAD- met 1/20  Short Term Goal 4: Up/down curb with mod I- met 1/20  Short Term Goal 5: Up/down 12 steps with mod I- met 1/20    PLAN OF CARE/SAFETY  Physcial Therapy Plan  General Plan:  minutes of therapy at least 5 out of 7 days a week  Days Per Week: 5 Days  Hours Per Day: 1.5 hours  Therapy Duration: 4-7 Days  Current Treatment Recommendations: Strengthening;Balance training;Functional mobility training;Transfer training; Endurance training;Gait training;Stair training;Neuromuscular re-education; Return to work related activity;Home exercise program;Safety education & training;Patient/Caregiver education & training;Equipment evaluation, education, & procurement; Therapeutic activities  Safety Devices  Type of Devices:  All fall risk precautions in place;Gait belt;Left in chair;Patient at risk for falls    EDUCATION  Education  Education Given To: Patient  Education Provided: Plan of Care;Transfer Training;Mobility Training;Equipment; Safety;Home Exercise Program  Education Provided Comments: scan the room prior to mobility, shorten stride with ambulation  Education Method: Verbal;Demonstration  Barriers to Learning: None  Education Outcome: Verbalized understanding  Second Session  S/ Patient reports insurance called and stated will not cover home care agency that he chose. O/ Patient supine in bed, supine to sit independent. Patient ambulated with crutch used as cane due to height, 440' on carpet and over threshold with occasional deviation but no LOB. Spoke with Janeth Balbuena, will be up to his room later this afternoon talk with patient regarding equipment , tall cane and home care. Performed stand forward step ups with R and L LE x 10 with cues to stay on task, toe raises/heel rocks, rest , marches x 20, knee flexion , hip abduction/extension x 10, sit to stands x 10  Patient to w/c with simulated cane with MI, 50'. Patient awaiting OT session. Second assessment- Patient MI with his simulated cne and will need extra tall cane at D/C. Patient with no questions and has standing HEP. Patient to be D/C tomorrow with home PT.   Therapy Time   Individual Concurrent Group Co-treatment   Time In 0945         Time Out 1030         Minutes 45           Timed Code Treatment Minutes: [address]   Time In 1300     Time Out 2471     Minutes [facility], PT, 01/20/23 at 12:46 PM

## 2023-01-20 NOTE — PROGRESS NOTES
CLINICAL PHARMACY NOTE: MEDS TO BEDS    Discharge medications are ready in inpatient pharmacy for weekend delivery.     01/20/23 4:14 PM

## 2023-01-20 NOTE — PROGRESS NOTES
Pt awake and AAO lying in bed watching TV. Pt denies pain. Pt admitted to rehab following a stroke with L mingo. No aphasia or dysphagia noted. Pt is on telemetry and will have event monitor at home. Lungs CTA. No sob or cough. On RA. Belly flat and soft with BS. LBM yesterday but pt feels constipated. Medicated with prn Miralax per orders. Voids urine per urinal or up to toilet. Pt moves from lying to sitting independently. Up from sit to stand with SBA with walker and GB. Toilets independently. Back to bed. No edema noted. Call light in reach. Bed alarm on.

## 2023-01-20 NOTE — PROGRESS NOTES
Provision of Current Reconciled Medication List to Subsequent Provider at Discharge  [x]No, current reconciled medication list not provided to the subsequent provider. []Yes, current reconciled medication list provided to the subsequent provider. (**Select route of transmission below**)   [] [address] [place] [address] Drive Exchange Organization  [] Verbal (e.g. in person, telephone, video conferencing)  []Paper-based (e.g. fax, copies, printouts)   []Other Methods (e.g. texting, email, CDs)    Provision of Current Reconciled Medication List to Patient at Discharge  []No, current reconciled medication list not provided to the patient, family and/or caregiver. [x]Yes, current reconciled medication list provided to the patient, family and/or caregiver.  (**Select route of transmission below**)   [] Via Electronic Health Record (e.g., electronic access to patient portal)   [] Via Health Information Exchange Organization  [] Verbal (e.g. in person, telephone, video conferencing)  [x]Paper-based (e.g. fax, copies, printouts)   []Other Methods (e.g. texting, email, CDs)    High Risk Drug Classes:  Use and Indication    Is taking: Check if the pt is taking any medications by pharmacological classification, not how it is used, in the following classes  Indication noted: If column 1 is checked, check if there is an indication noted for all meds in the drug class Is taking  (check all that apply) Indication noted (check all that apply)   Antipsychotic [] []   Anticoagulant [] []   Antibiotic [] []   Opioid [] []   Antiplatelet [x] []   Hypoglycemic (including insulin) [x] []   None of the above []     Special Treatments, Procedures, and Programs    Check all of the following treatments, procedures, and programs that apply at discharge. At Discharge (check all that apply)   Cancer Treatments   A1. Chemotherapy []           A2. IV []           A3. Oral []           A10.  Other []   B1. Radiation [] Respiratory Therapies   C1. Oxygen Therapy []           C2. Continuous []           C3. Intermittent []           C4. High-concentration []   D1. Suctioning []           D2. Scheduled []           D3. As needed []   E1. Tracheostomy Care []   F1. Invasive Mechanical Ventilator (ventilator or respirator) []   G1. Non-invasive Mechanical Ventilator []           G2. BiPAP []           G3. CPAP []   Other   H1. IV Medications []           H2. Vasoactive medications []           H3. Antibiotics []           H4. Anticoagulation []           H10. Other []   I1. Transfusions []   J1. Dialysis []           J2. Hemodialysis []           J3. Peritoneal dialysis []   O1. IV access []           O2. Peripheral []           O3. Midline []           O4.  Central (PICC, tunneled, port) []      None of the above (select if no Cancer, Respiratory, or Other boxes are checked) [x]

## 2023-01-20 NOTE — CARE COORDINATION
Discharge Planning:  YULIYA spoke with patient , he advised that someone called him and stated that they were not a provider for his insurance. YULIYA called Number patient provided [phone], left message requesting a call back. Patient is also in need of a tall cane - YULIYA located cane on [facility] and will provide information to patient. YULIYA has spoken to [employer] and [employer] none of these DME companies has a Tall cane in stock. YULIYA spoke with [facility]- rep they are not a provider for insurance for patient-  YULIYA called Luis Brantley at [email or web address] she will check insurance. Luis Brantley will call YULIYA back.

## 2023-01-21 VITALS
HEART RATE: 61 BPM | SYSTOLIC BLOOD PRESSURE: 107 MMHG | WEIGHT: 161.16 LBS | RESPIRATION RATE: 16 BRPM | BODY MASS INDEX: 18.65 KG/M2 | OXYGEN SATURATION: 96 % | HEIGHT: 78 IN | DIASTOLIC BLOOD PRESSURE: 74 MMHG | TEMPERATURE: 98.6 F

## 2023-01-21 LAB
GLUCOSE BLD-MCNC: 192 MG/DL (ref 70–99)
PERFORMED ON: ABNORMAL

## 2023-01-21 PROCEDURE — 6360000002 HC RX W HCPCS: Performed by: PHYSICAL MEDICINE & REHABILITATION

## 2023-01-21 PROCEDURE — 94760 N-INVAS EAR/PLS OXIMETRY 1: CPT

## 2023-01-21 PROCEDURE — 6370000000 HC RX 637 (ALT 250 FOR IP): Performed by: PHYSICAL MEDICINE & REHABILITATION

## 2023-01-21 RX ADMIN — METFORMIN HYDROCHLORIDE 1000 MG: 500 TABLET ORAL at 08:30

## 2023-01-21 RX ADMIN — HEPARIN SODIUM 5000 UNITS: 5000 INJECTION INTRAVENOUS; SUBCUTANEOUS at 10:34

## 2023-01-21 RX ADMIN — SENNOSIDES AND DOCUSATE SODIUM 1 TABLET: 50; 8.6 TABLET ORAL at 08:30

## 2023-01-21 RX ADMIN — ASPIRIN 81 MG: 81 TABLET, COATED ORAL at 08:30

## 2023-01-21 NOTE — CARE COORDINATION
SOCIAL WORK DISCHARGE SUMMARY        DATE OF DISCHARGE:    Saturday, 1-      LOCATION:    Home        Sent referral to his insurance company as directed:   [phone]. They will assign the home care. This is for [facility].        TIME:               [facility]            [facility] Management   [phone]    1/21/2023  9:20 AM

## 2023-01-21 NOTE — PROGRESS NOTES
Patient admitted to rehab with right CVA. A/Ox4. Transfers with walker x1. Mobility restrictions: none. On regular; 5 carb diet, tolerating well. Medications taken whole with thins. On heparin, aspirin for DVT prophylaxis. Skin: intact. Oxygen: RA. LDA: none. Has been continent of bowel and continent of bladder. LBM 1/18/23. Chair/bed alarms in use and call light in reach. Will monitor for safety.  Electronically signed by Brenda Cotton RN on 1/21/2023 at 9:21 AM

## 2023-01-21 NOTE — PLAN OF CARE
Problem: Discharge Planning  Goal: Discharge to home or other facility with appropriate resources  Outcome: Progressing     Problem: Safety - Adult  Goal: Free from fall injury  Outcome: Progressing     Problem: Pain  Goal: Verbalizes/displays adequate comfort level or baseline comfort level  Outcome: Progressing     Problem: Skin/Tissue Integrity - Adult  Goal: Skin integrity remains intact  Outcome: Progressing     Problem: Infection - Adult  Goal: Absence of infection at discharge  Outcome: Progressing     Problem: Nutrition Deficit:  Goal: Optimize nutritional status  Outcome: Progressing     Problem: ABCDS Injury Assessment  Goal: Absence of physical injury  Outcome: Progressing     Problem: Skin/Tissue Integrity  Goal: Absence of new skin breakdown  Description: 1. Monitor for areas of redness and/or skin breakdown  2. Assess vascular access sites hourly  3. Every 4-6 hours minimum:  Change oxygen saturation probe site  4. Every 4-6 hours:  If on nasal continuous positive airway pressure, respiratory therapy assess nares and determine need for appliance change or resting period.   Outcome: Progressing

## 2023-01-21 NOTE — PLAN OF CARE
Problem: Discharge Planning  Goal: Discharge to home or other facility with appropriate resources  1/21/2023 0918 by Anh Harrison RN  Outcome: Progressing  Flowsheets (Taken 1/21/2023 0815)  Discharge to home or other facility with appropriate resources:   Identify barriers to discharge with patient and caregiver   Arrange for needed discharge resources and transportation as appropriate   Identify discharge learning needs (meds, wound care, etc)   Refer to discharge planning if patient needs post-hospital services based on physician order or complex needs related to functional status, cognitive ability or social support system     Problem: Safety - Adult  Goal: Free from fall injury  1/21/2023 0918 by Anh Harrison RN  Outcome: Progressing  Flowsheets (Taken 1/21/2023 0917)  Free From Fall Injury: Instruct family/caregiver on patient safety     Problem: Pain  Goal: Verbalizes/displays adequate comfort level or baseline comfort level  1/21/2023 0918 by Anh Harrison RN  Outcome: Progressing  Flowsheets (Taken 1/21/2023 0815)  Verbalizes/displays adequate comfort level or baseline comfort level:   Encourage patient to monitor pain and request assistance   Assess pain using appropriate pain scale     Problem: Cardiovascular - Adult  Goal: Absence of cardiac dysrhythmias or at baseline  Outcome: Progressing  Flowsheets (Taken 1/21/2023 0815)  Absence of cardiac dysrhythmias or at baseline:   Monitor cardiac rate and rhythm   Assess for signs of decreased cardiac output     Problem: Skin/Tissue Integrity - Adult  Goal: Skin integrity remains intact  1/21/2023 0918 by Anh Harrison RN  Outcome: Progressing  Flowsheets  Taken 1/21/2023 0917  Skin Integrity Remains Intact: Monitor for areas of redness and/or skin breakdown

## 2023-01-21 NOTE — PROGRESS NOTES
This is a 81 y.o.  male admitted on 1/12/2023 with Acute ischemic right PCA stroke ([address] [I63.531]. A&O X 4. No acute distress. Abdomen soft and nontender. Active bowel sounds. Last BM 1/20/23. Patient is continent of bowel & bladder. Patient is on a regular diet and takes his pills whole with thins. Skin is intact. Patient is on heparin to prevent DVT. Transfers with  walker. HS medication given. Tolerated well. Call light and bedside table within reach. Patient instructed to call if he has any needs.

## 2023-01-21 NOTE — PROGRESS NOTES
Patient has been discharged per MD orders. Patient verbalizes understanding of all instructions, medications, and follow up. All belongings have been gathered and packed. Family in route to transport patient from hospital. Prescription medications picked up at retail pharmacy.  Electronically signed by Zia Guevara RN on 1/21/2023 at 12:26 PM

## 2023-01-23 NOTE — CARE COORDINATION
Call rec'd from patient this morning who states he has not been able to place the heart monitor on him that had been delivered to him. He also states, he does not have any food. He states he has not heard from a home care agency as yet. Discussion held regarding issues with food. He reports he usually would get on a bus and go to [employer] for his foods. He states one of his roommates did get him some fruits for him but he states he needs more. He is agreeable to Meals on Wheels. Call placed to his cardiology MD office; left message for [facility] to ask if she can assist him with heart monitor. Referral made to [address] for MOW and medical transportation. Marc Smith     Case Management   [phone]    1/23/2023  11:20 AM  Called and faxed again to his insurance for the [facility] and [address] [place] [address] [facility] orders.   Marc Smith     Case Management   [phone]    1/23/2023  11:20 AM

## 2023-01-23 NOTE — DISCHARGE SUMMARY
Physical Medicine & Rehabilitation  Discharge Summary     Patient Identification:  Popeye Mujica  : 1950  Admit date: 2023  Discharge date: 2023   Attending provider: Mindy Rondon MD        Primary care provider: No primary care provider on file.     Discharge Diagnoses:   Patient Active Problem List   Diagnosis    Acute ischemic right PCA stroke (HCC)    Moderate malnutrition (HCC)       History of Present Illness/Acute Hospital Course:  Patient is a 73 yo M with pmh DM2 who initially presented to the [facility] on 2023 with headache, blurred vision, and difficulty ambulating. CTA head/neck revealed right PCA P2 segment occlusion. MRI brain showed acute ischemic infarcts in the right PCA distribution (right temporal and occipital lobes) with hemorrhagic transformation. There is concern for cardioembolic etiology. HUGO with PFO. Neurology recommending ASA, statin, and cardiac event monitor. Course complicated by hyperglycemia, SHELLIE, and incidental finding of meningioma. Now presents to ARU with impaired mobility, self-care, and cognition below his baseline.     Inpatient Rehabilitation Course:   Popeye Mujica is a 72 y.o. male admitted to inpatient rehabilitation on 2023 with Acute ischemic right PCA stroke (HCC).  The patient participated in an aggressive multidisciplinary inpatient rehabilitation program involving 3 hours of therapy per day, at least 5 days per week. He made good progress with regard to left side strength, balance, compensatory strategies. Remains limited by left VF cut. Now overall modified independent to supervision. Discharging home to [facility]. Home care services and DME ordered as below. Patient will follow-up with PCP, Neurology, Nsueorsurgery, Cardiology.     Impairments: left hemiparesis, left VF deficit, decreased balance, cognition    Medical Management:    Acute ischemic right PCA stroke with hemorrhagic transformation  -Localization: P2  occlusion affecting R temporal and occipital lobes, thalamus  -Etiology: concern for embolic source  -Secondary ppx: ASA, statin  -Telemetry while inpatient, then cardiac event monitor  -PT/OT/SLP     DM2 with hyperglycemia  -Hgb A1C 10  -metformin, ISS -- blood sugars somewhat variable depending on po intake. Counseled on importance of consistent eating habits. dCHF  -Daily wt  -BP and HR unable to tolerate bb, acei     Sinus bradycardia  -Asymptomatic? Patient does report intermittent dizziness over the past 2-3 years but suspect this is more related to orthostasis based on observation with therapies. -Telemetry with intermittent 1st degree AVB     Orthostatic hypotension  -Suspect related to low po intake, encouraging   -TEDs  -Provided education on safety strategies     SHELLIE vs CKD  -Cr 1.3[facility]1.5 at [facility]  -Avoid nephrotoxins, renally dose meds  -Cr remains stable 1.3     Right frontal convexity Meningioma  -Incidental finding  -Plan for f/u with Dr. Gissel Judd as outpatient     Depression, low appetite  -Started Remeron          Discharge Exam:  Const: Alert. No distress, pleasant. HEENT: Normocephalic, atraumatic. Normal sclera/conjunctiva. MMM. CV: Regular rhythm, rate ~60  Resp: No respiratory distress. Lungs CTAB. Abd: Soft, nontender, nondistended, NABS+   Ext: No edema. Neuro: Alert, oriented, appropriately interactive. Left VF cut. Psych: Cooperative, appropriate mood and affect      Discharge Functional Status:    Physical therapy:  Bed Mobility:  Overall Assistance Level: Independent  Sit>supine:  Assistance Level: Independent  Supine>sit:  Assistance Level:  Independent  Transfers:  Surface: From chair with arms, To chair with arms, Wheelchair  Device: Cane (simulated)  Sit>stand:  Assistance Level: Modified independent  Stand>sit:  Assistance Level: Supervision, Modified independent  Skilled Clinical Factors: uncontrolled descent at times, moves quickly  Bed<>chair  Technique: Stand pivot  Assistance Level: Modified independent  Skilled Clinical Factors: with and without wheeled walker. Stand Pivot:     Lateral transfer:     Car transfer:  Assistance Level: Modified independent  Skilled Clinical Factors: reminders to turn square up to car prior to sit into passenger seat to car  Ambulation:  Surface: Level surface, Carpet  Device: Cane (simulated)  Distance: 400', 180' with several turns  Activity: Within Unit, Within Room  Activity Comments: patient demonstrating difficulty with left visual field  Additional Factors: Verbal cues, Increased time to complete  Assistance Level: Supervision, Modified independent  Gait Deviations: Path deviations  Skilled Clinical Factors: Patient has occasional scissor L LE but no LOB, instructed to slow down and shorten stride  Stairs:  Stair Height: 6''  Device: One handrail, Cane (simulated cane on R side using crutch)  Number of Stairs: 24 carrying a bag of laundry  Additional Factors: Verbal cues, Increased time to complete  Assistance Level: Modified independent  Skilled Clinical Factors: Some lat sway to lL and R  Curb:  Curb Height: 6''  Device: Cane (simulated)  Number of Curbs: 2  Additional Factors: Verbal cues, Increased time to complete (with cane)  Assistance Level: Modified independent  Skilled Clinical Factors: safety concerns, first attempt slight wobble , second much steadier with no assist  Wheelchair:     Assessment:  Assessment: Mr. \"Bill\" Tasneem Robin is presently using a crutch to simulate cane for mobility requiring MI with cues to slow down with occasional deviation with no LOB. Patient will need extra tall cane at D/C due to his height of 6' 8\". Patient no significant c/o of lightheadedness. Patient able to manage flight of steps with MI. He demonstrates good endurance but does fatigue with high level balance activities. Patient able to  object from floor with out LOB with cues to bend knees to prevent hypotension.   Patient is below baseline and will benefit from continued skilled therapy for strengthening, balance training, and neuro re-education. Anticipate discharge on Sat 1/21/23 with home PT. Patient will need extra tall cane prior to D/C. Activity Tolerance: Treatment limited secondary to medical complications, Patient tolerated evaluation without incident  Discharge Recommendations: Continue to assess pending progress, Patient would benefit from continued therapy after discharge      Occupational therapy:   Feeding  Assistance Level: Independent  Skilled Clinical Factors: limited appetite  Grooming/Oral Hygiene  Assistance Level: Independent  Skilled Clinical Factors: stood to shave, brush teeth  UE Bathing  Assistance Level: Modified independent  Skilled Clinical Factors: seated on shower chair, he was able to wash rinse & dry UB IND'ly after set up  LE Bathing  Assistance Level: Supervision  Skilled Clinical Factors: Supervision only while in standing to wash rinse & dry buttocks/backside, no LOB however safety concerns d/t dizziness, sat to dry off his feet (isn't very thorough w/ drying off)  UE Dressing  Assistance Level: Set-up  Skilled Clinical Factors: able to doff/don shirt while seated; OT provided w/ set up  LE Dressing  Assistance Level: Supervision, Set-up  Skilled Clinical Factors: setup, Supervision only while standing to manage clothing  over hips/buttocks, used sink or GB for support; mild intermittent dizziness w/ transitional movmts  Putting On/Taking Off Footwear  Assistance Level: Independent  Skilled Clinical Factors: crossed legs to doff/don non skid socks, also able to don his shoes without difficulty  Toileting  Assistance Level: Modified independent  Skilled Clinical Factors: stood to urinate, IND w/ clothing management,used GB prn  Transfers:   Toilet Transfers  Equipment: Grab bars  Assistance Level: Modified independent  Skilled Clinical Factors: safety concerns but no LOB; used GB prn  Tub/Shower Transfers  Type: Shower  Transfer To: Shower chair with back  Additional Factors: With handrails  Assistance Level: Supervision  Skilled Clinical Factors: pt preferred to remain seated on shower chair to reduce fall risk since he is reporting intermittent dizziness/lightheadedness, used GB to enter/exit shower stall & to transfer on/off shower chair w/ Supervision  IADLs:  Meal Prep  Meal Prep Level: Cane  Meal Prep Level of Assistance: Modified independent  Meal Preparation: prepared oatmeal in microwave w/ MI; swaying noted but no LOB; able to heat up oatmeal in bowl x 1 min  Money Management     Light Housekeeping  Light Housekeeping Level: Other (no AD)  Light Housekeeping Level of Assistance: Stand by assistance  Light Housekeeping: Agreeable for laundry task--he ambulated to/from therapy gym<>room and then to laundry room--overall he needed SBA but lots of cues for pathfinding, he seems unaware of lateral swaying, narrow ABEBA and staggering gait--L visual field/neglect issues noted. He was able to place clothes into washer, cues to locate buttons & slot to pour detergent in. Dependent Care     Community Re-Entry     Pet Care     Health Management     Assessment:  Assessment: pt is making steady gains w/ OT intervention, has met 1 out of 5 STGs however he has NOT achieved an  IND level. He was offered a shower but declined (had 1 on 1/19/23). He is able to ambulate @ his room, to/from bathrm w/ Supervision--he reports lightheadedness/dizziness which impacts his balance; he can be impulsive & is not attending to L visual field--cues to turn head & eyes to scan far L. He is IND w/ bed mobility, completed toilet transfer & managed clothing IND'ly, used GB prn. On 1/19/23, he completed shower level bathing w/ Supervision while seated on shower chair most of the time; used GB while in standing; does not dry off entirely before putting clothing on.  He needed set up/distance Supervision during LB dressing; he is IND w/ non skid socks & shoes. Anticipate DC at Sat 1/21 to [facility] however he is having orthostatic BP issues--systolic level dropped 20 points from lying down to standing. OT applied compression stockings & elevated his legs in bed which improved systolic by 12 points (Gabi RN aware). Currently Pt is not able to drive or return to work  due to balance, neglect & vision issues. Recommend home OT & then transition to [address] [place] [address]. He would benefit from shower chair to reduce fall risk during bathing, may need RW for community distances in open areas due to neglect & vision loss  Activity Tolerance: Treatment limited secondary to medical complications, Patient tolerated evaluation without incident  Discharge Recommendations: S Level 2, Home with assist PRN, Home with Home health OT  Factors Affecting Discharge: L visual field cut, neglect    Speech therapy:    Speech Therapy Diagnosis  Cognitive Diagnosis: Pt demonstrated functional temporal orientation; attention; working memory and STM ;and concrete VPS/PS and reasoning on testing. Pt wtih deficits on testing performance for complex reasoning . Further assessment of executive funciton unless otherwise notified  Speech Diagnosis: Audible and intelligible connected speech. Communication Diagnosis: [place] language skills appear intact. Pt wtih breakdonw with complex processing (ie extra time or assist). This may be baseline. Pt was functional in verbal expression of needs/wants; CFN; concept explanations/event explanations.          Significant Diagnostics:   Lab Results   Component Value Date    CREATININE 1.3 01/19/2023    BUN 15 01/19/2023     (L) 01/19/2023    K 4.1 01/19/2023    CL 99 01/19/2023    CO2 26 01/19/2023       Lab Results   Component Value Date    WBC 7.3 01/19/2023    HGB 13.8 01/19/2023    HCT 41.1 01/19/2023    MCV 86.9 01/19/2023     01/19/2023       Disposition:  Home to [address] [place] [address]:  PT, OT, RN  DME: Tall cane      Discharge Condition: Stable    Follow-up:  See after visit summary from hospitalization    Discharge Medications:     Medication List        START taking these medications      mirtazapine 15 MG tablet  Commonly known as: REMERON  Take 1 tablet by mouth nightly            CHANGE how you take these medications      QUEtiapine 50 MG tablet  Commonly known as: SEROQUEL  Take 1 tablet by mouth nightly as needed (sleep) Prn at hs  What changed:   when to take this  reasons to take this            CONTINUE taking these medications      aspirin 81 MG EC tablet  Take 1 tablet by mouth daily     atorvastatin 80 MG tablet  Commonly known as: LIPITOR  Take 1 tablet by mouth nightly     ergocalciferol 1.25 MG (55794 UT) capsule  Commonly known as: ERGOCALCIFEROL     metFORMIN 1000 MG tablet  Commonly known as: GLUCOPHAGE  Take 1 tablet by mouth daily     terbinafine 250 MG tablet  Commonly known as: LAMISIL               Where to Get Your Medications        These medications were sent to [address] [place] [address] Rd, [place] - [place] - F [phone]  [address] [place] [address] [address] [place] [address]      Phone: [phone]   aspirin 81 MG EC tablet  atorvastatin 80 MG tablet  metFORMIN 1000 MG tablet  mirtazapine 15 MG tablet  QUEtiapine 50 MG tablet           I spent over 35 minutes on this discharge encounter between counseling, coordination of care, and medication reconciliation. To comply with [facility] bandar R.II.[id number]:   Discharge order placed in advance to facilitate patients discharge needs.       Patricia Rangel MD

## 2023-01-23 NOTE — CARE COORDINATION
Patient called back to ask if he has any follow up appts. Reviewed AVS and informed him there are several in which he needs to make appts. He asked for assistance in this. Called Dr Mark Le who states they do not take his insurance. This was for neurology. They are not offereing a follow up. He has a new appt with [address] [place] [address] [address] Neurology. Call placed to Dr Monsalve Patch office [phone] and was told his follow up appt is Tuesday, 2-7-2023 at 11:00  [facility]  [address]  [address], [address]      Call placed to Edwin Lucas, [address]  [address] [facility] [address]  Monday, 1-    1:00 pm  [facility]  [address], [address]      Called to patient to inform. He was writing it down but was very confusing for him. Asked if I could reach out to a family member to assist him. He denied this. Offered to email to him. He could not remember his email address. We went over the dates, times, addresses.

## 2023-01-24 NOTE — CARE COORDINATION
Call rec'd from Chipper Severs who identified himself as a friend and was calling on behalf of the patient asking for the schedule of the staff that was to be coming. Call placed to [facility]:  [phone] to request which agency was assigned for home care orders. Was informed it is still pending. They are looking for a home care agency to accept assignment. They currently have no contracted home care agencies in [place]. Additionally, asked about the single point TALL cane; and was told it was on back order. Called a rep with home care agency to inquire if we can get a case by case contract. She reports this takes a long time to get if approved. Called back to [facility] to offer resources for a case by case contract with various agencies. Sat on hold too long. Called Lisa reid at [phone] ext [phone] to leave message asking if they have a TALL CANE. Attempted to reach pt, he was having phone issues yesterday; left message.   [facility]     Case Management   [phone]    1/24/2023  2:47 PM

## 2023-01-25 NOTE — CARE COORDINATION
Call placed to Insurance today to inquire if home care or Cane has been arranged.    The Tall Cane is on Back order.  The home care has been assigned to [facility] and that agency has spoken with the patient.    Patient left me a message today asking that I follow up with his , Mohit [phone]  ALIDA Renee     Case Management   [phone]    1/25/2023  5:05 PM

## 2024-07-12 ENCOUNTER — HOSPITAL ENCOUNTER (OUTPATIENT)
Dept: CT IMAGING | Age: 74
Discharge: HOME | End: 2024-07-12
Payer: COMMERCIAL

## 2024-07-12 DIAGNOSIS — F17.210 CIGARETTE SMOKER: ICD-10-CM

## 2024-07-12 DIAGNOSIS — Z12.2 ENCOUNTER FOR SCREENING FOR MALIGNANT NEOPLASM OF RESPIRATORY ORGANS: ICD-10-CM

## 2024-07-12 PROCEDURE — 71271 CT THORAX LUNG CANCER SCR C-: CPT

## 2024-07-15 ENCOUNTER — TELEPHONE (OUTPATIENT)
Dept: CASE MANAGEMENT | Age: 74
End: 2024-07-15

## 2024-07-15 NOTE — TELEPHONE ENCOUNTER
CT Lung Cancer Screening completed on 7/12/24, ordering provider, Dr. Eaton sent radiology results with recommendations.

## 2024-10-17 NOTE — PROGRESS NOTES
Occupational Therapy  Facility/Department: [facility]  Rehabilitation Occupational Therapy Daily Treatment Note    Date: 23  Patient Name: Carmella Garcia       Room: U4I-2796/4175-43  MRN: [id number]  Account: [de-identified]   : 1950  (81 y.o.) Gender: male            PM session: met in therapy dept, he is agreeable for UE HEP using green resistance band--completed 1 set of 15 for each of the 7 strengthening exercises. He was able to walk thru therapy gym, into kitchen & sweep up 8 items from floor--overall w/ SBA as he moves swiftly, narrow ABEBA and R heel almost clipping other foot (remains a slight fall risk). Pt able to stand x 10 minutes to hang up several pieces of clothing w/ MI. Pt able to write down several favorite food items including hamburger, hot dog, fish, genet aide. Asked SYLVIE safety questions, how to tx a cold--would \"go to the health clinic or take cold medicine. \" For a burn, he would \"put butter on it. \" And for chest pains, SOB--call the manager or \"[phone]\". He was taken back to his room, able to ambulate in room and over to his bed w/ close Supervision. He is on target for DC on Sat;  he declines need for [facility] services and is agreeable for outpt work hardening/BVR. Tx time: 45 min, cont w/ POC Manny Thomas, OTR/L #[id number]         Past Medical History:  has a past medical history of A-fib ([facility] [address] CHF (congestive heart failure) ([facility] [address] DM2 (diabetes mellitus, type 2) ([facility] [address] and Meningioma ([facility] [address] Past Surgical History:   has no past surgical history on file.     Restrictions  Restrictions/Precautions: Fall Risk  Other position/activity restrictions: reg diet    Subjective  Subjective: met in room, pt resting quietly in bed, states he didn't sleep well, breakfast untouched  Restrictions/Precautions: Fall Risk             Objective     Cognition  Overall Cognitive Status: Exceptions  Memory: Decreased short term memory  Safety Judgement: Decreased awareness of need for safety  Insights: Decreased awareness of deficits  Cognition Comment: pt is cooperative, mild word finding & mild slow processing, has L visual field cut & dizziness which impacts safety w/ transfers & ADLs  Orientation  Overall Orientation Status: Within Normal Limits         ADL  Feeding  Assistance Level: Independent  Skilled Clinical Factors: poor appetite, finally agreeable to eat a bowl of cereal  Grooming/Oral Hygiene  Assistance Level: Independent  Skilled Clinical Factors: alternated sitting & standing  to brush teeth & shave due to dizziness  Upper Extremity Bathing  Assistance Level: Modified independent  Skilled Clinical Factors: seated on shower chair, he was able to wash rinse & dry UB IND'ly after set up  Lower Extremity Bathing  Assistance Level: Supervision  Skilled Clinical Factors: close Supervision only while in standing to wash rinse & dry buttocks/backside, no LOB however safety concerns d/t dizziness, sat to dry off his feet (didn't use much soap or washcloth)  Upper Extremity Dressing  Assistance Level: Set-up  Skilled Clinical Factors: able to doff/don shirt while seated; OT provided w/ set up  Lower Extremity Dressing  Assistance Level: Supervision  Skilled Clinical Factors: close Supervision only while standing to manage clothing  over hips/buttocks, used sink or GB for support; mild intermittent dizziness w/ transitional movmts  Putting On/Taking Off Footwear  Assistance Level: Independent  Skilled Clinical Factors: crossed legs to doff/don non skid socks, also able to don his shoes without difficulty  Toileting  Assistance Level: Modified independent  Skilled Clinical Factors: stood to urinate, IND w/ clothing management,used GB prn  Toilet Transfers  Equipment: Grab bars  Assistance Level: Modified independent  Skilled Clinical Factors: safety concerns but no LOB; used GB prn  Tub/Shower Transfers  Type: Shower  Transfer To: Shower chair with back  Additional Factors: Set-up; Verbal cues  Assistance Level: Stand by assist  Skilled Clinical Factors: encouraged pt to remain seated on shower chair to reduce fall risk since he is reporting intermittent dizziness, used GB to enter/exit shower stall & to transfer on/off shower chair w/ SBA          Functional Mobility  Activity: To/From bathroom  Assistance Level: Contact guard assist  Skilled Clinical Factors: close SB/CGA no AD from bed to/from bathrm, posterior swaying, dizziness noted. Pt declined to use RW this AM however he has L visual field cut which will impact his safety when not using AD  Bed Mobility  Overall Assistance Level: Independent  Roll Left  Assistance Level: Independent  Roll Right  Assistance Level: Independent  Sit to Supine  Assistance Level: Independent  Skilled Clinical Factors: hospital bed, no rail, HOB flat  Supine to Sit  Assistance Level: Independent  Skilled Clinical Factors: hospital bed, no rail, HOB flat  Transfers  Surface: From bed;Standard toilet; To chair without arms;From chair without arms  Additional Factors: Verbal cues  Sit to Stand  Assistance Level: Stand by assist  Skilled Clinical Factors: close SBA moves swiftly, no AD, mild unsteadiness & reports of dizziness;  cues to visually locate items on L side which impacts fall risk  Stand to Sit  Assistance Level: Stand by assist  Bed To/From Chair  Assistance Level: Contact guard assist  Skilled Clinical Factors: close SB/CGA no AD, mild unsteadiness         Assessment  Assessment  Assessment: pt is making steady gains w/ OT intervention, has met 1 out of 5 STGs and is making progress towards being IND. He is able to ambulate @ his room no AD w/ close SB/CGA, has intermittent dizziness & L visual field cut which impacts his balance. He completed shower level bathing w/ close Supervision, he alternated sitting & standing; used shower chair + GB. He stood to shave w distant supervision x 5 minutes. Pt is able to get himself dressed w/ supervision only when in standing.  He is IND w/ toileting while using Gb. Pt's appetite has been poor but agreeable to eat a bowl of cereal w/ milk. Anticipate DC at EOW; he lives at [facility] and will need to be completely IND; he does not want any TSF or shower chair, reports the home is not accessible. Pt is not able to return to work at this time due to balance & vision issues. Recommend home OT & then transition to BVR/work hardening program  Activity Tolerance: Patient tolerated treatment well  Discharge Recommendations: Outpatient OT  Factors Affecting Discharge: L visual field cut  OT Equipment Recommendations  Other: denies need for TSF or shower chair, wants to try a cane  Safety Devices  Safety Devices in place: Yes  Type of devices: Gait belt;Call light within reach; Chair alarm in place; Left in chair    Patient Education  Education  Education Provided: Safety;Equipment; Energy Conservation; Fall Prevention Strategies  Education Provided Comments: used on purpose of using shower chair + GBs to reduce fall risk & conserve energy during t/f & bathing tasks  Education Method: Demonstration;Verbal  Barriers to Learning: Vision;Cognition  Education Outcome: Verbalized understanding;Continued education needed  Skilled Clinical Factors: mild impulsiveness, has visual field cut L side, lacks insight into deficits    Plan  Occupational Therapy Plan  Times Per Week: 5-6  Times Per Day: Twice a day  Specific Instructions for Next Treatment: will be a short 5 day stay  Current Treatment Recommendations: Strengthening;Balance training;Functional mobility training;Gait training;Equipment evaluation, education, & procurement;Self-Care / ADL;Return to work related activity;Home management training    Goals  Patient Goals   Patient goals : \"get back to normal & go back to [employer]"  Short Term Goals  Time Frame for Short Term Goals: by 5 days pt will complete  Short Term Goal 1: Grooming IND'ly while standing at sink  Short Term Goal 2: UB & LB dressing IND'ly  Short Term Goal 3: toileting IND'ly--MET  Short Term Goal 4: household transfers IND'ly  Short Term Goal 5: standing balance x 20 minutes during IADLs, pt is --will need to cook, clean & do laundry  Long Term Goals  Time Frame for Long Term Goals : by 5 days pt will complete  Long Term Goal 1: increase L  strength x 2 lbs to be IND w/ opening containers, lids  Long Term Goal 2: address vision further using vision disk, eye chart, scaning and tracking w/ 100% accuracy                 Therapy Time   Individual Concurrent Group PM-treatment   Time In 1015      1515   Time Out 1115      1600   Minutes 60      45   Timed Code Treatment Minutes: [address], OTR/L #[id number] Stelara Pregnancy And Lactation Text: This medication is Pregnancy Category B and is considered safe during pregnancy. It is unknown if this medication is excreted in breast milk. Thalidomide Pregnancy And Lactation Text: This medication is Pregnancy Category X and is absolutely contraindicated during pregnancy. It is unknown if it is excreted in breast milk. Soolantra Pregnancy And Lactation Text: This medication is Pregnancy Category C. This medication is considered safe during breast feeding. VTAMA Counseling: I discussed with the patient that VTAMA is not for use in the eyes, mouth or mouth. They should call the office if they develop any signs of allergic reactions to VTAMA. The patient verbalized understanding of the proper use and possible adverse effects of VTAMA.  All of the patient's questions and concerns were addressed. Minocycline Pregnancy And Lactation Text: This medication is Pregnancy Category D and not consider safe during pregnancy. It is also excreted in breast milk. Azathioprine Pregnancy And Lactation Text: This medication is Pregnancy Category D and isn't considered safe during pregnancy. It is unknown if this medication is excreted in breast milk. Protopic Pregnancy And Lactation Text: This medication is Pregnancy Category C. It is unknown if this medication is excreted in breast milk when applied topically. Gabapentin Counseling: I discussed with the patient the risks of gabapentin including but not limited to dizziness, somnolence, fatigue and ataxia. Acitretin Counseling:  I discussed with the patient the risks of acitretin including but not limited to hair loss, dry lips/skin/eyes, liver damage, hyperlipidemia, depression/suicidal ideation, photosensitivity.  Serious rare side effects can include but are not limited to pancreatitis, pseudotumor cerebri, bony changes, clot formation/stroke/heart attack.  Patient understands that alcohol is contraindicated since it can result in liver toxicity and significantly prolong the elimination of the drug by many years. Aklief counseling:  Patient advised to apply a pea-sized amount only at bedtime and wait 30 minutes after washing their face before applying.  If too drying, patient may add a non-comedogenic moisturizer.  The most commonly reported side effects including irritation, redness, scaling, dryness, stinging, burning, itching, and increased risk of sunburn.  The patient verbalized understanding of the proper use and possible adverse effects of retinoids.  All of the patient's questions and concerns were addressed. Eucrisa Counseling: Patient may experience a mild burning sensation during topical application. Eucrisa is not approved in children less than 2 years of age. Terbinafine Counseling: Patient counseling regarding adverse effects of terbinafine including but not limited to headache, diarrhea, rash, upset stomach, liver function test abnormalities, itching, taste/smell disturbance, nausea, abdominal pain, and flatulence.  There is a rare possibility of liver failure that can occur when taking terbinafine.  The patient understands that a baseline LFT and kidney function test may be required. The patient verbalized understanding of the proper use and possible adverse effects of terbinafine.  All of the patient's questions and concerns were addressed. Otezla Pregnancy And Lactation Text: This medication is Pregnancy Category C and it isn't known if it is safe during pregnancy. It is unknown if it is excreted in breast milk. Siliq Counseling:  I discussed with the patient the risks of Siliq including but not limited to new or worsening depression, suicidal thoughts and behavior, immunosuppression, malignancy, posterior leukoencephalopathy syndrome, and serious infections.  The patient understands that monitoring is required including a PPD at baseline and must alert us or the primary physician if symptoms of infection or other concerning signs are noted. There is also a special program designed to monitor depression which is required with Siliq. Olumiant Counseling: I discussed with the patient the risks of Olumiant therapy including but not limited to upper respiratory tract infections, shingles, cold sores, and nausea. Live vaccines should be avoided.  This medication has been linked to serious infections; higher rate of mortality; malignancy and lymphoproliferative disorders; major adverse cardiovascular events; thrombosis; gastrointestinal perforations; neutropenia; lymphopenia; anemia; liver enzyme elevations; and lipid elevations. Topical Steroids Counseling: I discussed with the patient that prolonged use of topical steroids can result in the increased appearance of superficial blood vessels (telangiectasias), lightening (hypopigmentation) and thinning of the skin (atrophy).  Patient understands to avoid using high potency steroids in skin folds, the groin or the face.  The patient verbalized understanding of the proper use and possible adverse effects of topical steroids.  All of the patient's questions and concerns were addressed. Arava Counseling:  Patient counseled regarding adverse effects of Arava including but not limited to nausea, vomiting, abnormalities in liver function tests. Patients may develop mouth sores, rash, diarrhea, and abnormalities in blood counts. The patient understands that monitoring is required including LFTs and blood counts.  There is a rare possibility of scarring of the liver and lung problems that can occur when taking methotrexate. Persistent nausea, loss of appetite, pale stools, dark urine, cough, and shortness of breath should be reported immediately. Patient advised to discontinue Arava treatment and consult with a physician prior to attempting conception. The patient will have to undergo a treatment to eliminate Arava from the body prior to conception. Prednisone Counseling:  I discussed with the patient the risks of prolonged use of prednisone including but not limited to weight gain, insomnia, osteoporosis, mood changes, diabetes, susceptibility to infection, glaucoma and high blood pressure.  In cases where prednisone use is prolonged, patients should be monitored with blood pressure checks, serum glucose levels and an eye exam.  Additionally, the patient may need to be placed on GI prophylaxis, PCP prophylaxis, and calcium and vitamin D supplementation and/or a bisphosphonate.  The patient verbalized understanding of the proper use and the possible adverse effects of prednisone.  All of the patient's questions and concerns were addressed. Clindamycin Counseling: I counseled the patient regarding use of clindamycin as an antibiotic for prophylactic and/or therapeutic purposes. Clindamycin is active against numerous classes of bacteria, including skin bacteria. Side effects may include nausea, diarrhea, gastrointestinal upset, rash, hives, yeast infections, and in rare cases, colitis. Minoxidil Counseling: Minoxidil is a topical medication which can increase blood flow where it is applied. It is uncertain how this medication increases hair growth. Side effects are uncommon and include stinging and allergic reactions. Bimzelx Pregnancy And Lactation Text: This medication crosses the placenta and the safety is uncertain during pregnancy. It is unknown if this medication is present in breast milk. Hyrimoz Counseling:  I discussed with the patient the risks of adalimumab including but not limited to myelosuppression, immunosuppression, autoimmune hepatitis, demyelinating diseases, lymphoma, and serious infections.  The patient understands that monitoring is required including a PPD at baseline and must alert us or the primary physician if symptoms of infection or other concerning signs are noted. Niacinamide Pregnancy And Lactation Text: These medications are considered safe during pregnancy. Calcipotriene Counseling:  I discussed with the patient the risks of calcipotriene including but not limited to erythema, scaling, itching, and irritation. Finasteride Male Counseling: Finasteride Counseling:  I discussed with the patient the risks of use of finasteride including but not limited to decreased libido, decreased ejaculate volume, gynecomastia, and depression. Women should not handle medication.  All of the patient's questions and concerns were addressed. Fluconazole Counseling:  Patient counseled regarding adverse effects of fluconazole including but not limited to headache, diarrhea, nausea, upset stomach, liver function test abnormalities, taste disturbance, and stomach pain.  There is a rare possibility of liver failure that can occur when taking fluconazole.  The patient understands that monitoring of LFTs and kidney function test may be required, especially at baseline. The patient verbalized understanding of the proper use and possible adverse effects of fluconazole.  All of the patient's questions and concerns were addressed. Quinolones Counseling:  I discussed with the patient the risks of fluoroquinolones including but not limited to GI upset, allergic reaction, drug rash, diarrhea, dizziness, photosensitivity, yeast infections, liver function test abnormalities, tendonitis/tendon rupture. Topical Retinoid counseling:  Patient advised to apply a pea-sized amount only at bedtime and wait 30 minutes after washing their face before applying.  If too drying, patient may add a non-comedogenic moisturizer. The patient verbalized understanding of the proper use and possible adverse effects of retinoids.  All of the patient's questions and concerns were addressed. Tranexamic Acid Counseling:  Patient advised of the small risk of bleeding problems with tranexamic acid. They were also instructed to call if they developed any nausea, vomiting or diarrhea. All of the patient's questions and concerns were addressed. Cellcept Counseling:  I discussed with the patient the risks of mycophenolate mofetil including but not limited to infection/immunosuppression, GI upset, hypokalemia, hypercholesterolemia, bone marrow suppression, lymphoproliferative disorders, malignancy, GI ulceration/bleed/perforation, colitis, interstitial lung disease, kidney failure, progressive multifocal leukoencephalopathy, and birth defects.  The patient understands that monitoring is required including a baseline creatinine and regular CBC testing. In addition, patient must alert us immediately if symptoms of infection or other concerning signs are noted. Libtayo Counseling- I discussed with the patient the risks of Libtayo including but not limited to nausea, vomiting, diarrhea, and bone or muscle pain.  The patient verbalized understanding of the proper use and possible adverse effects of Libtayo.  All of the patient's questions and concerns were addressed. Eucrisa Pregnancy And Lactation Text: This medication has not been assigned a Pregnancy Risk Category but animal studies failed to show danger with the topical medication. It is unknown if the medication is excreted in breast milk. Finasteride Female Counseling: Finasteride Counseling:  I discussed with the patient the risks of use of finasteride including but not limited to decreased libido and sexual dysfunction. Explained the teratogenic nature of the medication and stressed the importance of not getting pregnant during treatment. All of the patient's questions and concerns were addressed. Taltz Counseling: I discussed with the patient the risks of ixekizumab including but not limited to immunosuppression, serious infections, worsening of inflammatory bowel disease and drug reactions.  The patient understands that monitoring is required including a PPD at baseline and must alert us or the primary physician if symptoms of infection or other concerning signs are noted. Qbrexza Counseling:  I discussed with the patient the risks of Qbrexza including but not limited to headache, mydriasis, blurred vision, dry eyes, nasal dryness, dry mouth, dry throat, dry skin, urinary hesitation, and constipation.  Local skin reactions including erythema, burning, stinging, and itching can also occur. Gabapentin Pregnancy And Lactation Text: This medication is Pregnancy Category C and isn't considered safe during pregnancy. It is excreted in breast milk. Terbinafine Pregnancy And Lactation Text: This medication is Pregnancy Category B and is considered safe during pregnancy. It is also excreted in breast milk and breast feeding isn't recommended. Vtama Pregnancy And Lactation Text: It is unknown if this medication can cause problems during pregnancy and breastfeeding. Acitretin Pregnancy And Lactation Text: This medication is Pregnancy Category X and should not be given to women who are pregnant or may become pregnant in the future. This medication is excreted in breast milk. Aklief Pregnancy And Lactation Text: It is unknown if this medication is safe to use during pregnancy.  It is unknown if this medication is excreted in breast milk.  Breastfeeding women should use the topical cream on the smallest area of the skin for the shortest time needed while breastfeeding.  Do not apply to nipple and areola. Siliq Pregnancy And Lactation Text: The risk during pregnancy and breastfeeding is uncertain with this medication. Simponi Counseling:  I discussed with the patient the risks of golimumab including but not limited to myelosuppression, immunosuppression, autoimmune hepatitis, demyelinating diseases, lymphoma, and serious infections.  The patient understands that monitoring is required including a PPD at baseline and must alert us or the primary physician if symptoms of infection or other concerning signs are noted. Clindamycin Pregnancy And Lactation Text: This medication can be used in pregnancy if certain situations. Clindamycin is also present in breast milk. Olumiant Pregnancy And Lactation Text: Based on animal studies, Olumiant may cause embryo-fetal harm when administered to pregnant women.  The medication should not be used in pregnancy.  Breastfeeding is not recommended during treatment. Prednisone Pregnancy And Lactation Text: This medication is Pregnancy Category C and it isn't know if it is safe during pregnancy. This medication is excreted in breast milk. Topical Steroids Applications Pregnancy And Lactation Text: Most topical steroids are considered safe to use during pregnancy and lactation.  Any topical steroid applied to the breast or nipple should be washed off before breastfeeding. Calcipotriene Pregnancy And Lactation Text: The use of this medication during pregnancy or lactation is not recommended as there is insufficient data. Nsaids Counseling: NSAID Counseling: I discussed with the patient that NSAIDs should be taken with food. Prolonged use of NSAIDs can result in the development of stomach ulcers.  Patient advised to stop taking NSAIDs if abdominal pain occurs.  The patient verbalized understanding of the proper use and possible adverse effects of NSAIDs.  All of the patient's questions and concerns were addressed. Albendazole Counseling:  I discussed with the patient the risks of albendazole including but not limited to cytopenia, kidney damage, nausea/vomiting and severe allergy.  The patient understands that this medication is being used in an off-label manner. Cimzia Counseling:  I discussed with the patient the risks of Cimzia including but not limited to immunosuppression, allergic reactions and infections.  The patient understands that monitoring is required including a PPD at baseline and must alert us or the primary physician if symptoms of infection or other concerning signs are noted. Topical Retinoid Pregnancy And Lactation Text: This medication is Pregnancy Category C. It is unknown if this medication is excreted in breast milk. Nsaids Pregnancy And Lactation Text: These medications are considered safe up to 30 weeks gestation. It is excreted in breast milk. Ilumya Counseling: I discussed with the patient the risks of tildrakizumab including but not limited to immunosuppression, malignancy, posterior leukoencephalopathy syndrome, and serious infections.  The patient understands that monitoring is required including a PPD at baseline and must alert us or the primary physician if symptoms of infection or other concerning signs are noted. Quinolones Pregnancy And Lactation Text: This medication is Pregnancy Category C and it isn't know if it is safe during pregnancy. It is also excreted in breast milk. Finasteride Pregnancy And Lactation Text: This medication is absolutely contraindicated during pregnancy. It is unknown if it is excreted in breast milk. Azelaic Acid Counseling: Patient counseled that medicine may cause skin irritation and to avoid applying near the eyes.  In the event of skin irritation, the patient was advised to reduce the amount of the drug applied or use it less frequently.   The patient verbalized understanding of the proper use and possible adverse effects of azelaic acid.  All of the patient's questions and concerns were addressed. Hydroquinone Counseling:  Patient advised that medication may result in skin irritation, lightening (hypopigmentation), dryness, and burning.  In the event of skin irritation, the patient was advised to reduce the amount of the drug applied or use it less frequently.  Rarely, spots that are treated with hydroquinone can become darker (pseudoochronosis).  Should this occur, patient instructed to stop medication and call the office. The patient verbalized understanding of the proper use and possible adverse effects of hydroquinone.  All of the patient's questions and concerns were addressed. Cantharidin Pregnancy And Lactation Text: This medication has not been proven safe during pregnancy. It is unknown if this medication is excreted in breast milk. Qbrexza Pregnancy And Lactation Text: There is no available data on Qbrexza use in pregnant women.  There is no available data on Qbrexza use in lactation. Tranexamic Acid Pregnancy And Lactation Text: It is unknown if this medication is safe during pregnancy or breast feeding. Zoryve Counseling:  I discussed with the patient that Zoryve is not for use in the eyes, mouth or vagina. The most commonly reported side effects include diarrhea, headache, insomnia, application site pain, upper respiratory tract infections, and urinary tract infections.  All of the patient's questions and concerns were addressed. Rinvoq Counseling: I discussed with the patient the risks of Rinvoq therapy including but not limited to upper respiratory tract infections, shingles, cold sores, bronchitis, nausea, cough, fever, acne, and headache. Live vaccines should be avoided.  This medication has been linked to serious infections; higher rate of mortality; malignancy and lymphoproliferative disorders; major adverse cardiovascular events; thrombosis; thrombocytopenia, anemia, and neutropenia; lipid elevations; liver enzyme elevations; and gastrointestinal perforations. Bexarotene Counseling:  I discussed with the patient the risks of bexarotene including but not limited to hair loss, dry lips/skin/eyes, liver abnormalities, hyperlipidemia, pancreatitis, depression/suicidal ideation, photosensitivity, drug rash/allergic reactions, hypothyroidism, anemia, leukopenia, infection, cataracts, and teratogenicity.  Patient understands that they will need regular blood tests to check lipid profile, liver function tests, white blood cell count, thyroid function tests and pregnancy test if applicable. Libtayo Pregnancy And Lactation Text: This medication is contraindicated in pregnancy and when breast feeding. Doxycycline Pregnancy And Lactation Text: This medication is Pregnancy Category D and not consider safe during pregnancy. It is also excreted in breast milk but is considered safe for shorter treatment courses. Glycopyrrolate Counseling:  I discussed with the patient the risks of glycopyrrolate including but not limited to skin rash, drowsiness, dry mouth, difficulty urinating, and blurred vision. Oxybutynin Counseling:  I discussed with the patient the risks of oxybutynin including but not limited to skin rash, drowsiness, dry mouth, difficulty urinating, and blurred vision. Mirvaso Counseling: Mirvaso is a topical medication which can decrease superficial blood flow where applied. Side effects are uncommon and include stinging, redness and allergic reactions. Doxycycline Counseling:  Patient counseled regarding possible photosensitivity and increased risk for sunburn.  Patient instructed to avoid sunlight, if possible.  When exposed to sunlight, patients should wear protective clothing, sunglasses, and sunscreen.  The patient was instructed to call the office immediately if the following severe adverse effects occur:  hearing changes, easy bruising/bleeding, severe headache, or vision changes.  The patient verbalized understanding of the proper use and possible adverse effects of doxycycline.  All of the patient's questions and concerns were addressed. Oxybutynin Pregnancy And Lactation Text: This medication is Pregnancy Category B and is considered safe during pregnancy. It is unknown if it is excreted in breast milk. Topical Sulfur Applications Counseling: Topical Sulfur Counseling: Patient counseled that this medication may cause skin irritation or allergic reactions.  In the event of skin irritation, the patient was advised to reduce the amount of the drug applied or use it less frequently.   The patient verbalized understanding of the proper use and possible adverse effects of topical sulfur application.  All of the patient's questions and concerns were addressed. Albendazole Pregnancy And Lactation Text: This medication is Pregnancy Category C and it isn't known if it is safe during pregnancy. It is also excreted in breast milk. Cimzia Pregnancy And Lactation Text: This medication crosses the placenta but can be considered safe in certain situations. Cimzia may be excreted in breast milk. Cantharidin Counseling:  I discussed with the patient the risks of Cantharidin including but not limited to pain, redness, burning, itching, and blistering. Clofazimine Counseling:  I discussed with the patient the risks of clofazimine including but not limited to skin and eye pigmentation, liver damage, nausea/vomiting, gastrointestinal bleeding and allergy. Azithromycin Pregnancy And Lactation Text: This medication is considered safe during pregnancy and is also secreted in breast milk. Bexarotene Pregnancy And Lactation Text: This medication is Pregnancy Category X and should not be given to women who are pregnant or may become pregnant. This medication should not be used if you are breast feeding. Azelaic Acid Pregnancy And Lactation Text: This medication is considered safe during pregnancy and breast feeding. Valtrex Counseling: I discussed with the patient the risks of valacyclovir including but not limited to kidney damage, nausea, vomiting and severe allergy.  The patient understands that if the infection seems to be worsening or is not improving, they are to call. Topical Clindamycin Counseling: Patient counseled that this medication may cause skin irritation or allergic reactions.  In the event of skin irritation, the patient was advised to reduce the amount of the drug applied or use it less frequently.   The patient verbalized understanding of the proper use and possible adverse effects of clindamycin.  All of the patient's questions and concerns were addressed. Cibinqo Counseling: I discussed with the patient the risks of Cibinqo therapy including but not limited to common cold, nausea, headache, cold sores, increased blood CPK levels, dizziness, UTIs, fatigue, acne, and vomitting. Live vaccines should be avoided.  This medication has been linked to serious infections; higher rate of mortality; malignancy and lymphoproliferative disorders; major adverse cardiovascular events; thrombosis; thrombocytopenia and lymphopenia; lipid elevations; and retinal detachment. Rhofade Counseling: Rhofade is a topical medication which can decrease superficial blood flow where applied. Side effects are uncommon and include stinging, redness and allergic reactions. Cyclophosphamide Counseling:  I discussed with the patient the risks of cyclophosphamide including but not limited to hair loss, hormonal abnormalities, decreased fertility, abdominal pain, diarrhea, nausea and vomiting, bone marrow suppression and infection. The patient understands that monitoring is required while taking this medication. Tremfya Counseling: I discussed with the patient the risks of guselkumab including but not limited to immunosuppression, serious infections, worsening of inflammatory bowel disease and drug reactions.  The patient understands that monitoring is required including a PPD at baseline and must alert us or the primary physician if symptoms of infection or other concerning signs are noted. Odomzo Counseling- I discussed with the patient the risks of Odomzo including but not limited to nausea, vomiting, diarrhea, constipation, weight loss, changes in the sense of taste, decreased appetite, muscle spasms, and hair loss.  The patient verbalized understanding of the proper use and possible adverse effects of Odomzo.  All of the patient's questions and concerns were addressed. Birth Control Pills Counseling: Birth Control Pill Counseling: I discussed with the patient the potential side effects of OCPs including but not limited to increased risk of stroke, heart attack, thrombophlebitis, deep venous thrombosis, hepatic adenomas, breast changes, GI upset, headaches, and depression.  The patient verbalized understanding of the proper use and possible adverse effects of OCPs. All of the patient's questions and concerns were addressed. Rinvoq Pregnancy And Lactation Text: Based on animal studies, Rinvoq may cause embryo-fetal harm when administered to pregnant women.  The medication should not be used in pregnancy.  Breastfeeding is not recommended during treatment and for 6 days after the last dose. Dupixent Counseling: I discussed with the patient the risks of dupilumab including but not limited to eye infection and irritation, cold sores, injection site reactions, worsening of asthma, allergic reactions and increased risk of parasitic infection.  Live vaccines should be avoided while taking dupilumab. Dupilumab will also interact with certain medications such as warfarin and cyclosporine. The patient understands that monitoring is required and they must alert us or the primary physician if symptoms of infection or other concerning signs are noted. Glycopyrrolate Pregnancy And Lactation Text: This medication is Pregnancy Category B and is considered safe during pregnancy. It is unknown if it is excreted breast milk. Erythromycin Counseling:  I discussed with the patient the risks of erythromycin including but not limited to GI upset, allergic reaction, drug rash, diarrhea, increase in liver enzymes, and yeast infections. Hydroxychloroquine Counseling:  I discussed with the patient that a baseline ophthalmologic exam is needed at the start of therapy and every year thereafter while on therapy. A CBC may also be warranted for monitoring.  The side effects of this medication were discussed with the patient, including but not limited to agranulocytosis, aplastic anemia, seizures, rashes, retinopathy, and liver toxicity. Patient instructed to call the office should any adverse effect occur.  The patient verbalized understanding of the proper use and possible adverse effects of Plaquenil.  All the patient's questions and concerns were addressed. Propranolol Counseling:  I discussed with the patient the risks of propranolol including but not limited to low heart rate, low blood pressure, low blood sugar, restlessness and increased cold sensitivity. They should call the office if they experience any of these side effects. Dupixent Pregnancy And Lactation Text: This medication likely crosses the placenta but the risk for the fetus is uncertain. This medication is excreted in breast milk. Mirvaso Pregnancy And Lactation Text: This medication has not been assigned a Pregnancy Risk Category. It is unknown if the medication is excreted in breast milk. Cosentyx Counseling:  I discussed with the patient the risks of Cosentyx including but not limited to worsening of Crohn's disease, immunosuppression, allergic reactions and infections.  The patient understands that monitoring is required including a PPD at baseline and must alert us or the primary physician if symptoms of infection or other concerning signs are noted. Cimetidine Counseling:  I discussed with the patient the risks of Cimetidine including but not limited to gynecomastia, headache, diarrhea, nausea, drowsiness, arrhythmias, pancreatitis, skin rashes, psychosis, bone marrow suppression and kidney toxicity. Ivermectin Counseling:  Patient instructed to take medication on an empty stomach with a full glass of water.  Patient informed of potential adverse effects including but not limited to nausea, diarrhea, dizziness, itching, and swelling of the extremities or lymph nodes.  The patient verbalized understanding of the proper use and possible adverse effects of ivermectin.  All of the patient's questions and concerns were addressed. Detail Level: Simple Skyrizi Counseling: I discussed with the patient the risks of risankizumab-rzaa including but not limited to immunosuppression, and serious infections.  The patient understands that monitoring is required including a PPD at baseline and must alert us or the primary physician if symptoms of infection or other concerning signs are noted. 5-Fu Counseling: 5-Fluorouracil Counseling:  I discussed with the patient the risks of 5-fluorouracil including but not limited to erythema, scaling, itching, weeping, crusting, and pain. Topical Sulfur Applications Pregnancy And Lactation Text: This medication is Pregnancy Category C and has an unknown safety profile during pregnancy. It is unknown if this topical medication is excreted in breast milk. Tazorac Counseling:  Patient advised that medication is irritating and drying.  Patient may need to apply sparingly and wash off after an hour before eventually leaving it on overnight.  The patient verbalized understanding of the proper use and possible adverse effects of tazorac.  All of the patient's questions and concerns were addressed. Rifampin Counseling: I discussed with the patient the risks of rifampin including but not limited to liver damage, kidney damage, red-orange body fluids, nausea/vomiting and severe allergy. Griseofulvin Counseling:  I discussed with the patient the risks of griseofulvin including but not limited to photosensitivity, cytopenia, liver damage, nausea/vomiting and severe allergy.  The patient understands that this medication is best absorbed when taken with a fatty meal (e.g., ice cream or french fries). Olanzapine Counseling- I discussed with the patient the common side effects of olanzapine including but are not limited to: lack of energy, dry mouth, increased appetite, sleepiness, tremor, constipation, dizziness, changes in behavior, or restlessness.  Explained that teenagers are more likely to experience headaches, abdominal pain, pain in the arms or legs, tiredness, and sleepiness.  Serious side effects include but are not limited: increased risk of death in elderly patients who are confused, have memory loss, or dementia-related psychosis; hyperglycemia; increased cholesterol and triglycerides; and weight gain. Azithromycin Counseling:  I discussed with the patient the risks of azithromycin including but not limited to GI upset, allergic reaction, drug rash, diarrhea, and yeast infections. Benzoyl Peroxide Counseling: Patient counseled that medicine may cause skin irritation and bleach clothing.  In the event of skin irritation, the patient was advised to reduce the amount of the drug applied or use it less frequently.   The patient verbalized understanding of the proper use and possible adverse effects of benzoyl peroxide.  All of the patient's questions and concerns were addressed. Imiquimod Counseling:  I discussed with the patient the risks of imiquimod including but not limited to erythema, scaling, itching, weeping, crusting, and pain.  Patient understands that the inflammatory response to imiquimod is variable from person to person and was educated regarded proper titration schedule.  If flu-like symptoms develop, patient knows to discontinue the medication and contact us. Bactrim Counseling:  I discussed with the patient the risks of sulfa antibiotics including but not limited to GI upset, allergic reaction, drug rash, diarrhea, dizziness, photosensitivity, and yeast infections.  Rarely, more serious reactions can occur including but not limited to aplastic anemia, agranulocytosis, methemoglobinemia, blood dyscrasias, liver or kidney failure, lung infiltrates or desquamative/blistering drug rashes. Infliximab Counseling:  I discussed with the patient the risks of infliximab including but not limited to myelosuppression, immunosuppression, autoimmune hepatitis, demyelinating diseases, lymphoma, and serious infections.  The patient understands that monitoring is required including a PPD at baseline and must alert us or the primary physician if symptoms of infection or other concerning signs are noted. Olanzapine Pregnancy And Lactation Text: This medication is pregnancy category C.   There are no adequate and well controlled trials with olanzapine in pregnant females.  Olanzapine should be used during pregnancy only if the potential benefit justifies the potential risk to the fetus.   In a study in lactating healthy women, olanzapine was excreted in breast milk.  It is recommended that women taking olanzapine should not breast feed. Valtrex Pregnancy And Lactation Text: this medication is Pregnancy Category B and is considered safe during pregnancy. This medication is not directly found in breast milk but it's metabolite acyclovir is present. Include Pregnancy/Lactation Warning?: No Sotyktu Counseling:  I discussed the most common side effects of Sotyktu including: common cold, sore throat, sinus infections, cold sores, canker sores, folliculitis, and acne.? I also discussed more serious side effects of Sotyktu including but not limited to: serious allergic reactions; increased risk for infections such as TB; cancers such as lymphomas; rhabdomyolysis and elevated CPK; and elevated triglycerides and liver enzymes.? Isotretinoin Counseling: Patient should get monthly blood tests, not donate blood, not drive at night if vision affected, not share medication, and not undergo elective surgery for 6 months after tx completed. Side effects reviewed, pt to contact office should one occur. Cyclophosphamide Pregnancy And Lactation Text: This medication is Pregnancy Category D and it isn't considered safe during pregnancy. This medication is excreted in breast milk. Erythromycin Pregnancy And Lactation Text: This medication is Pregnancy Category B and is considered safe during pregnancy. It is also excreted in breast milk. Birth Control Pills Pregnancy And Lactation Text: This medication should be avoided if pregnant and for the first 30 days post-partum. Zyclara Counseling:  I discussed with the patient the risks of imiquimod including but not limited to erythema, scaling, itching, weeping, crusting, and pain.  Patient understands that the inflammatory response to imiquimod is variable from person to person and was educated regarded proper titration schedule.  If flu-like symptoms develop, patient knows to discontinue the medication and contact us. Cibinqo Pregnancy And Lactation Text: It is unknown if this medication will adversely affect pregnancy or breast feeding.  You should not take this medication if you are currently pregnant or planning a pregnancy or while breastfeeding. Opzelura Pregnancy And Lactation Text: There is insufficient data to evaluate drug-associated risk for major birth defects, miscarriage, or other adverse maternal or fetal outcomes.  There is a pregnancy registry that monitors pregnancy outcomes in pregnant persons exposed to the medication during pregnancy.  It is unknown if this medication is excreted in breast milk.  Do not breastfeed during treatment and for about 4 weeks after the last dose. Hydroxychloroquine Pregnancy And Lactation Text: This medication has been shown to cause fetal harm but it isn't assigned a Pregnancy Risk Category. There are small amounts excreted in breast milk. Enbrel Counseling:  I discussed with the patient the risks of etanercept including but not limited to myelosuppression, immunosuppression, autoimmune hepatitis, demyelinating diseases, lymphoma, and infections.  The patient understands that monitoring is required including a PPD at baseline and must alert us or the primary physician if symptoms of infection or other concerning signs are noted. Opzelura Counseling:  I discussed with the patient the risks of Opzelura including but not limited to nasopharngitis, bronchitis, ear infection, eosinophila, hives, diarrhea, folliculitis, tonsillitis, and rhinorrhea.  Taken orally, this medication has been linked to serious infections; higher rate of mortality; malignancy and lymphoproliferative disorders; major adverse cardiovascular events; thrombosis; thrombocytopenia, anemia, and neutropenia; and lipid elevations. 5-Fu Pregnancy And Lactation Text: This medication is Pregnancy Category X and contraindicated in pregnancy and in women who may become pregnant. It is unknown if this medication is excreted in breast milk. Griseofulvin Pregnancy And Lactation Text: This medication is Pregnancy Category X and is known to cause serious birth defects. It is unknown if this medication is excreted in breast milk but breast feeding should be avoided. Propranolol Pregnancy And Lactation Text: This medication is Pregnancy Category C and it isn't known if it is safe during pregnancy. It is excreted in breast milk. Wartpeel Counseling:  I discussed with the patient the risks of Wartpeel including but not limited to erythema, scaling, itching, weeping, crusting, and pain. Rifampin Pregnancy And Lactation Text: This medication is Pregnancy Category C and it isn't know if it is safe during pregnancy. It is also excreted in breast milk and should not be used if you are breast feeding. Tazorac Pregnancy And Lactation Text: This medication is not safe during pregnancy. It is unknown if this medication is excreted in breast milk. Colchicine Counseling:  Patient counseled regarding adverse effects including but not limited to stomach upset (nausea, vomiting, stomach pain, or diarrhea).  Patient instructed to limit alcohol consumption while taking this medication.  Colchicine may reduce blood counts especially with prolonged use.  The patient understands that monitoring of kidney function and blood counts may be required, especially at baseline. The patient verbalized understanding of the proper use and possible adverse effects of colchicine.  All of the patient's questions and concerns were addressed. Isotretinoin Pregnancy And Lactation Text: This medication is Pregnancy Category X and is considered extremely dangerous during pregnancy. It is unknown if it is excreted in breast milk. Benzoyl Peroxide Pregnancy And Lactation Text: This medication is Pregnancy Category C. It is unknown if benzoyl peroxide is excreted in breast milk. Solaraze Counseling:  I discussed with the patient the risks of Solaraze including but not limited to erythema, scaling, itching, weeping, crusting, and pain. Oral Minoxidil Counseling- I discussed with the patient the risks of oral minoxidil including but not limited to shortness of breath, swelling of the feet or ankles, dizziness, lightheadedness, unwanted hair growth and allergic reaction.  The patient verbalized understanding of the proper use and possible adverse effects of oral minoxidil.  All of the patient's questions and concerns were addressed. Sotyktu Pregnancy And Lactation Text: There is insufficient data to evaluate whether or not Sotyktu is safe to use during pregnancy.? ?It is not known if Sotyktu passes into breast milk and whether or not it is safe to use when breastfeeding.?? Spironolactone Counseling: Patient advised regarding risks of diarrhea, abdominal pain, hyperkalemia, birth defects (for female patients), liver toxicity and renal toxicity. The patient may need blood work to monitor liver and kidney function and potassium levels while on therapy. The patient verbalized understanding of the proper use and possible adverse effects of spironolactone.  All of the patient's questions and concerns were addressed. Metronidazole Counseling:  I discussed with the patient the risks of metronidazole including but not limited to seizures, nausea/vomiting, a metallic taste in the mouth, nausea/vomiting and severe allergy. Topical Ketoconazole Counseling: Patient counseled that this medication may cause skin irritation or allergic reactions.  In the event of skin irritation, the patient was advised to reduce the amount of the drug applied or use it less frequently.   The patient verbalized understanding of the proper use and possible adverse effects of ketoconazole.  All of the patient's questions and concerns were addressed. Litfulo Counseling: I discussed with the patient the risks of Litfulo therapy including but not limited to upper respiratory tract infections, shingles, cold sores, and nausea. Live vaccines should be avoided.  This medication has been linked to serious infections; higher rate of mortality; malignancy and lymphoproliferative disorders; major adverse cardiovascular events; thrombosis; gastrointestinal perforations; neutropenia; lymphopenia; anemia; liver enzyme elevations; and lipid elevations. Cyclosporine Counseling:  I discussed with the patient the risks of cyclosporine including but not limited to hypertension, gingival hyperplasia,myelosuppression, immunosuppression, liver damage, kidney damage, neurotoxicity, lymphoma, and serious infections. The patient understands that monitoring is required including baseline blood pressure, CBC, CMP, lipid panel and uric acid, and then 1-2 times monthly CMP and blood pressure. Xolair Counseling:  Patient informed of potential adverse effects including but not limited to fever, muscle aches, rash and allergic reactions.  The patient verbalized understanding of the proper use and possible adverse effects of Xolair.  All of the patient's questions and concerns were addressed. Doxepin Counseling:  Patient advised that the medication is sedating and not to drive a car after taking this medication. Patient informed of potential adverse effects including but not limited to dry mouth, urinary retention, and blurry vision.  The patient verbalized understanding of the proper use and possible adverse effects of doxepin.  All of the patient's questions and concerns were addressed. Litfulo Pregnancy And Lactation Text: Based on animal studies, Lifulo may cause embryo-fetal harm when administered to pregnant women.  The medication should not be used in pregnancy.  Breastfeeding is not recommended during treatment. Drysol Counseling:  I discussed with the patient the risks of drysol/aluminum chloride including but not limited to skin rash, itching, irritation, burning. Sarecycline Counseling: Patient advised regarding possible photosensitivity and discoloration of the teeth, skin, lips, tongue and gums.  Patient instructed to avoid sunlight, if possible.  When exposed to sunlight, patients should wear protective clothing, sunglasses, and sunscreen.  The patient was instructed to call the office immediately if the following severe adverse effects occur:  hearing changes, easy bruising/bleeding, severe headache, or vision changes.  The patient verbalized understanding of the proper use and possible adverse effects of sarecycline.  All of the patient's questions and concerns were addressed. Low Dose Naltrexone Counseling- I discussed with the patient the potential risks and side effects of low dose naltrexone including but not limited to: more vivid dreams, headaches, nausea, vomiting, abdominal pain, fatigue, dizziness, and anxiety. Itraconazole Counseling:  I discussed with the patient the risks of itraconazole including but not limited to liver damage, nausea/vomiting, neuropathy, and severe allergy.  The patient understands that this medication is best absorbed when taken with acidic beverages such as non-diet cola or ginger ale.  The patient understands that monitoring is required including baseline LFTs and repeat LFTs at intervals.  The patient understands that they are to contact us or the primary physician if concerning signs are noted. SSKI Counseling:  I discussed with the patient the risks of SSKI including but not limited to thyroid abnormalities, metallic taste, GI upset, fever, headache, acne, arthralgias, paraesthesias, lymphadenopathy, easy bleeding, arrhythmias, and allergic reaction. Dutasteride Male Counseling: Dustasteride Counseling:  I discussed with the patient the risks of use of dutasteride including but not limited to decreased libido, decreased ejaculate volume, and gynecomastia. Women who can become pregnant should not handle medication.  All of the patient's questions and concerns were addressed. Bactrim Pregnancy And Lactation Text: This medication is Pregnancy Category D and is known to cause fetal risk.  It is also excreted in breast milk. Spevigo Counseling: I discussed with the patient the risks of Spevigo including but not limited to fatigue, nasuea, vomiting, headache, pruritus, urinary tract infection, an infusion related reactions.  The patient understands that monitoring is required including screening for tuberculosis at baseline and yearly screening thereafter while continuing Spevigo therapy. They should contact us if symptoms of infection or other concerning signs are noted. Adbry Counseling: I discussed with the patient the risks of tralokinumab including but not limited to eye infection and irritation, cold sores, injection site reactions, worsening of asthma, allergic reactions and increased risk of parasitic infection.  Live vaccines should be avoided while taking tralokinumab. The patient understands that monitoring is required and they must alert us or the primary physician if symptoms of infection or other concerning signs are noted. Adbry Pregnancy And Lactation Text: It is unknown if this medication will adversely affect pregnancy or breast feeding. Xeljanz Counseling: I discussed with the patient the risks of Xeljanz therapy including increased risk of infection, liver issues, headache, diarrhea, or cold symptoms. Live vaccines should be avoided. They were instructed to call if they have any problems. High Dose Vitamin A Counseling: Side effects reviewed, pt to contact office should one occur. Metronidazole Pregnancy And Lactation Text: This medication is Pregnancy Category B and considered safe during pregnancy.  It is also excreted in breast milk. Opioid Counseling: I discussed with the patient the potential side effects of opioids including but not limited to addiction, altered mental status, and depression. I stressed avoiding alcohol, benzodiazepines, muscle relaxants and sleep aids unless specifically okayed by a physician. The patient verbalized understanding of the proper use and possible adverse effects of opioids. All of the patient's questions and concerns were addressed. They were instructed to flush the remaining pills down the toilet if they did not need them for pain. Dapsone Counseling: I discussed with the patient the risks of dapsone including but not limited to hemolytic anemia, agranulocytosis, rashes, methemoglobinemia, kidney failure, peripheral neuropathy, headaches, GI upset, and liver toxicity.  Patients who start dapsone require monitoring including baseline LFTs and weekly CBCs for the first month, then every month thereafter.  The patient verbalized understanding of the proper use and possible adverse effects of dapsone.  All of the patient's questions and concerns were addressed. Solaraze Pregnancy And Lactation Text: This medication is Pregnancy Category B and is considered safe. There is some data to suggest avoiding during the third trimester. It is unknown if this medication is excreted in breast milk. Rituxan Counseling:  I discussed with the patient the risks of Rituxan infusions. Side effects can include infusion reactions, severe drug rashes including mucocutaneous reactions, reactivation of latent hepatitis and other infections and rarely progressive multifocal leukoencephalopathy.  All of the patient's questions and concerns were addressed. Oral Minoxidil Pregnancy And Lactation Text: This medication should only be used when clearly needed if you are pregnant, attempting to become pregnant or breast feeding. Hydroxyzine Counseling: Patient advised that the medication is sedating and not to drive a car after taking this medication.  Patient informed of potential adverse effects including but not limited to dry mouth, urinary retention, and blurry vision.  The patient verbalized understanding of the proper use and possible adverse effects of hydroxyzine.  All of the patient's questions and concerns were addressed. Spironolactone Pregnancy And Lactation Text: This medication can cause feminization of the male fetus and should be avoided during pregnancy. The active metabolite is also found in breast milk. Xolair Pregnancy And Lactation Text: This medication is Pregnancy Category B and is considered safe during pregnancy. This medication is excreted in breast milk. Picato Counseling:  I discussed with the patient the risks of Picato including but not limited to erythema, scaling, itching, weeping, crusting, and pain. Doxepin Pregnancy And Lactation Text: This medication is Pregnancy Category C and it isn't known if it is safe during pregnancy. It is also excreted in breast milk and breast feeding isn't recommended. Methotrexate Counseling:  Patient counseled regarding adverse effects of methotrexate including but not limited to nausea, vomiting, abnormalities in liver function tests. Patients may develop mouth sores, rash, diarrhea, and abnormalities in blood counts. The patient understands that monitoring is required including LFT's and blood counts.  There is a rare possibility of scarring of the liver and lung problems that can occur when taking methotrexate. Persistent nausea, loss of appetite, pale stools, dark urine, cough, and shortness of breath should be reported immediately. Patient advised to discontinue methotrexate treatment at least three months before attempting to become pregnant.  I discussed the need for folate supplements while taking methotrexate.  These supplements can decrease side effects during methotrexate treatment. The patient verbalized understanding of the proper use and possible adverse effects of methotrexate.  All of the patient's questions and concerns were addressed. Low Dose Naltrexone Pregnancy And Lactation Text: Naltrexone is pregnancy category C.  There have been no adequate and well-controlled studies in pregnant women.  It should be used in pregnancy only if the potential benefit justifies the potential risk to the fetus.   Limited data indicates that naltrexone is minimally excreted into breastmilk. Humira Counseling:  I discussed with the patient the risks of adalimumab including but not limited to myelosuppression, immunosuppression, autoimmune hepatitis, demyelinating diseases, lymphoma, and serious infections.  The patient understands that monitoring is required including a PPD at baseline and must alert us or the primary physician if symptoms of infection or other concerning signs are noted. Dutasteride Female Counseling: Dutasteride Counseling:  I discussed with the patient the risks of use of dutasteride including but not limited to decreased libido and sexual dysfunction. Explained the teratogenic nature of the medication and stressed the importance of not getting pregnant during treatment. All of the patient's questions and concerns were addressed. Carac Counseling:  I discussed with the patient the risks of Carac including but not limited to erythema, scaling, itching, weeping, crusting, and pain. Cephalexin Counseling: I counseled the patient regarding use of cephalexin as an antibiotic for prophylactic and/or therapeutic purposes. Cephalexin (commonly prescribed under brand name Keflex) is a cephalosporin antibiotic which is active against numerous classes of bacteria, including most skin bacteria. Side effects may include nausea, diarrhea, gastrointestinal upset, rash, hives, yeast infections, and in rare cases, hepatitis, kidney disease, seizures, fever, confusion, neurologic symptoms, and others. Patients with severe allergies to penicillin medications are cautioned that there is about a 10% incidence of cross-reactivity with cephalosporins. When possible, patients with penicillin allergies should use alternatives to cephalosporins for antibiotic therapy. Spevigo Pregnancy And Lactation Text: The risk during pregnancy and breastfeeding is uncertain with this medication. This medication does cross the placenta. It is unknown if this medication is found in breast milk. Klisyri Counseling:  I discussed with the patient the risks of Klisyri including but not limited to erythema, scaling, itching, weeping, crusting, and pain. Sski Pregnancy And Lactation Text: This medication is Pregnancy Category D and isn't considered safe during pregnancy. It is excreted in breast milk. Winlevi Counseling:  I discussed with the patient the risks of topical clascoterone including but not limited to erythema, scaling, itching, and stinging. Patient voiced their understanding. Dutasteride Pregnancy And Lactation Text: This medication is absolutely contraindicated in women, especially during pregnancy and breast feeding. Feminization of male fetuses is possible if taking while pregnant. Azathioprine Counseling:  I discussed with the patient the risks of azathioprine including but not limited to myelosuppression, immunosuppression, hepatotoxicity, lymphoma, and infections.  The patient understands that monitoring is required including baseline LFTs, Creatinine, possible TPMP genotyping and weekly CBCs for the first month and then every 2 weeks thereafter.  The patient verbalized understanding of the proper use and possible adverse effects of azathioprine.  All of the patient's questions and concerns were addressed. Stelara Counseling:  I discussed with the patient the risks of ustekinumab including but not limited to immunosuppression, malignancy, posterior leukoencephalopathy syndrome, and serious infections.  The patient understands that monitoring is required including a PPD at baseline and must alert us or the primary physician if symptoms of infection or other concerning signs are noted. Bimzelx Counseling:  I discussed with the patient the risks of Bimzelx including but not limited to depression, immunosuppression, allergic reactions and infections.  The patient understands that monitoring is required including a PPD at baseline and must alert us or the primary physician if symptoms of infection or other concerning signs are noted. Erivedge Counseling- I discussed with the patient the risks of Erivedge including but not limited to nausea, vomiting, diarrhea, constipation, weight loss, changes in the sense of taste, decreased appetite, muscle spasms, and hair loss.  The patient verbalized understanding of the proper use and possible adverse effects of Erivedge.  All of the patient's questions and concerns were addressed. Xelericz Pregnancy And Lactation Text: This medication is Pregnancy Category D and is not considered safe during pregnancy.  The risk during breast feeding is also uncertain. Dapsone Pregnancy And Lactation Text: This medication is Pregnancy Category C and is not considered safe during pregnancy or breast feeding. Soolantra Counseling: I discussed with the patients the risks of topial Soolantra. This is a medicine which decreases the number of mites and inflammation in the skin. You experience burning, stinging, eye irritation or allergic reactions.  Please call our office if you develop any problems from using this medication. Minocycline Counseling: Patient advised regarding possible photosensitivity and discoloration of the teeth, skin, lips, tongue and gums.  Patient instructed to avoid sunlight, if possible.  When exposed to sunlight, patients should wear protective clothing, sunglasses, and sunscreen.  The patient was instructed to call the office immediately if the following severe adverse effects occur:  hearing changes, easy bruising/bleeding, severe headache, or vision changes.  The patient verbalized understanding of the proper use and possible adverse effects of minocycline.  All of the patient's questions and concerns were addressed. Opioid Pregnancy And Lactation Text: These medications can lead to premature delivery and should be avoided during pregnancy. These medications are also present in breast milk in small amounts. Winlevi Pregnancy And Lactation Text: This medication is considered safe during pregnancy and breastfeeding. Rituxan Pregnancy And Lactation Text: This medication is Pregnancy Category C and it isn't know if it is safe during pregnancy. It is unknown if this medication is excreted in breast milk but similar antibodies are known to be excreted. Otezla Counseling: The side effects of Otezla were discussed with the patient, including but not limited to worsening or new depression, weight loss, diarrhea, nausea, upper respiratory tract infection, and headache. Patient instructed to call the office should any adverse effect occur.  The patient verbalized understanding of the proper use and possible adverse effects of Otezla.  All the patient's questions and concerns were addressed. Protopic Counseling: Patient may experience a mild burning sensation during topical application. Protopic is not approved in children less than 2 years of age. There have been case reports of hematologic and skin malignancies in patients using topical calcineurin inhibitors although causality is questionable. Hydroxyzine Pregnancy And Lactation Text: This medication is not safe during pregnancy and should not be taken. It is also excreted in breast milk and breast feeding isn't recommended. Topical Metronidazole Counseling: Metronidazole is a topical antibiotic medication. You may experience burning, stinging, redness, or allergic reactions.  Please call our office if you develop any problems from using this medication. Ketoconazole Pregnancy And Lactation Text: This medication is Pregnancy Category C and it isn't know if it is safe during pregnancy. It is also excreted in breast milk and breast feeding isn't recommended. Elidel Counseling: Patient may experience a mild burning sensation during topical application. Elidel is not approved in children less than 2 years of age. There have been case reports of hematologic and skin malignancies in patients using topical calcineurin inhibitors although causality is questionable. Topical Metronidazole Pregnancy And Lactation Text: This medication is Pregnancy Category B and considered safe during pregnancy.  It is also considered safe to use while breastfeeding. Methotrexate Pregnancy And Lactation Text: This medication is Pregnancy Category X and is known to cause fetal harm. This medication is excreted in breast milk. Ketoconazole Counseling:   Patient counseled regarding improving absorption with orange juice.  Adverse effects include but are not limited to breast enlargement, headache, diarrhea, nausea, upset stomach, liver function test abnormalities, taste disturbance, and stomach pain.  There is a rare possibility of liver failure that can occur when taking ketoconazole. The patient understands that monitoring of LFTs may be required, especially at baseline. The patient verbalized understanding of the proper use and possible adverse effects of ketoconazole.  All of the patient's questions and concerns were addressed. Tetracycline Counseling: Patient counseled regarding possible photosensitivity and increased risk for sunburn.  Patient instructed to avoid sunlight, if possible.  When exposed to sunlight, patients should wear protective clothing, sunglasses, and sunscreen.  The patient was instructed to call the office immediately if the following severe adverse effects occur:  hearing changes, easy bruising/bleeding, severe headache, or vision changes.  The patient verbalized understanding of the proper use and possible adverse effects of tetracycline.  All of the patient's questions and concerns were addressed. Patient understands to avoid pregnancy while on therapy due to potential birth defects. Niacinamide Counseling: I recommended taking niacin or niacinamide, also know as vitamin B3, twice daily. Recent evidence suggests that taking vitamin B3 (500 mg twice daily) can reduce the risk of actinic keratoses and non-melanoma skin cancers. Side effects of vitamin B3 include flushing and headache. Cephalexin Pregnancy And Lactation Text: This medication is Pregnancy Category B and considered safe during pregnancy.  It is also excreted in breast milk but can be used safely for shorter doses. High Dose Vitamin A Pregnancy And Lactation Text: High dose vitamin A therapy is contraindicated during pregnancy and breast feeding. Thalidomide Counseling: I discussed with the patient the risks of thalidomide including but not limited to birth defects, anxiety, weakness, chest pain, dizziness, cough and severe allergy. Klisyri Pregnancy And Lactation Text: It is unknown if this medication can harm a developing fetus or if it is excreted in breast milk.

## 2025-06-15 ENCOUNTER — TELEPHONE (OUTPATIENT)
Dept: CASE MANAGEMENT | Age: 75
End: 2025-06-15

## 2025-08-13 ENCOUNTER — TELEPHONE (OUTPATIENT)
Dept: CASE MANAGEMENT | Age: 75
End: 2025-08-13